# Patient Record
Sex: FEMALE | Race: WHITE | NOT HISPANIC OR LATINO | Employment: OTHER | URBAN - METROPOLITAN AREA
[De-identification: names, ages, dates, MRNs, and addresses within clinical notes are randomized per-mention and may not be internally consistent; named-entity substitution may affect disease eponyms.]

---

## 2017-01-17 ENCOUNTER — OFFICE VISIT (OUTPATIENT)
Dept: FAMILY MEDICINE | Facility: CLINIC | Age: 82
End: 2017-01-17
Payer: MEDICARE

## 2017-01-17 VITALS
RESPIRATION RATE: 18 BRPM | BODY MASS INDEX: 37.13 KG/M2 | SYSTOLIC BLOOD PRESSURE: 138 MMHG | HEIGHT: 62 IN | WEIGHT: 201.75 LBS | DIASTOLIC BLOOD PRESSURE: 82 MMHG | HEART RATE: 70 BPM

## 2017-01-17 DIAGNOSIS — Z12.31 ENCOUNTER FOR SCREENING MAMMOGRAM FOR MALIGNANT NEOPLASM OF BREAST: ICD-10-CM

## 2017-01-17 DIAGNOSIS — Z12.39 BREAST CANCER SCREENING: ICD-10-CM

## 2017-01-17 DIAGNOSIS — I10 ESSENTIAL HYPERTENSION: ICD-10-CM

## 2017-01-17 DIAGNOSIS — R29.898 WEAKNESS OF BOTH LOWER EXTREMITIES: Primary | ICD-10-CM

## 2017-01-17 DIAGNOSIS — R26.9 ABNORMAL TANDEM GAIT TEST: ICD-10-CM

## 2017-01-17 DIAGNOSIS — H53.453 PERIPHERAL VISUAL FIELD DEFECT OF BOTH EYES: ICD-10-CM

## 2017-01-17 LAB
CREAT SERPL-MCNC: 1.2 MG/DL
EST. GFR  (AFRICAN AMERICAN): 48 ML/MIN/1.73 M^2
EST. GFR  (NON AFRICAN AMERICAN): 41 ML/MIN/1.73 M^2

## 2017-01-17 PROCEDURE — 99999 PR PBB SHADOW E&M-EST. PATIENT-LVL III: CPT | Mod: PBBFAC,,, | Performed by: FAMILY MEDICINE

## 2017-01-17 PROCEDURE — 99214 OFFICE O/P EST MOD 30 MIN: CPT | Mod: S$GLB,,, | Performed by: FAMILY MEDICINE

## 2017-01-17 PROCEDURE — 99499 UNLISTED E&M SERVICE: CPT | Mod: S$PBB,,, | Performed by: FAMILY MEDICINE

## 2017-01-17 PROCEDURE — 82565 ASSAY OF CREATININE: CPT

## 2017-01-17 PROCEDURE — 36415 COLL VENOUS BLD VENIPUNCTURE: CPT | Mod: S$GLB,,, | Performed by: FAMILY MEDICINE

## 2017-01-17 NOTE — PROGRESS NOTES
Subjective:       Patient ID: Alysia Ross is a 85 y.o. female.    Chief Complaint: Knee Pain (Mark knee pain, Rt worse than Lt)    Pt is a 85 y.o. female who presents for evaluation and management of   Encounter Diagnosis   Name Primary?    Weakness of both lower extremities Yes   trouble lifting her feet when she walks   2 weeks   Worse with getting up, prolonged walking   No exacerbation or alleviating factors       Doing well on current meds. Denies any side effects. Prevention is up to date.  Review of Systems   Musculoskeletal: Positive for arthralgias and back pain.   Neurological: Positive for weakness.       Objective:      Physical Exam   Constitutional: She is oriented to person, place, and time. She appears well-developed and well-nourished.   HENT:   Head: Normocephalic and atraumatic.   Right Ear: External ear normal.   Left Ear: External ear normal.   Nose: Nose normal.   Mouth/Throat: Oropharynx is clear and moist.   Decreased visual fields peripherally bilaterally    Eyes: EOM are normal. Pupils are equal, round, and reactive to light.   Neck: Normal range of motion. Neck supple. No JVD present. No tracheal deviation present. No thyromegaly present.   Cardiovascular: Normal rate, normal heart sounds and intact distal pulses.    No murmur heard.  Pulmonary/Chest: Effort normal and breath sounds normal. No respiratory distress. She has no wheezes. She has no rales. She exhibits no tenderness.   Abdominal: Soft. Bowel sounds are normal. She exhibits no distension and no mass. There is no tenderness. There is no rebound and no guarding.   Musculoskeletal: Normal range of motion. She exhibits no edema or tenderness.   Lymphadenopathy:     She has no cervical adenopathy.   Neurological: She is alert and oriented to person, place, and time. She has normal reflexes. She displays normal reflexes. No cranial nerve deficit. She exhibits normal muscle tone. Coordination normal.   Skin: Skin is warm and  dry. No rash noted. No erythema. No pallor.   Psychiatric: She has a normal mood and affect. Her behavior is normal. Judgment and thought content normal.       Assessment:       1. Weakness of both lower extremities    2. Abnormal tandem gait test    3. Essential hypertension    4. 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

## 2017-01-17 NOTE — MR AVS SNAPSHOT
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 [phone]    2017 8:00 AM Carl Aguayo MD [facility] [phone]      Goals (5 Years of Data)     None      [facility]     [facility] Nurse Care [facility] - 24/7 Assistance  Registered nurses in the [facility] provide clinical advisement, health education, appointment booking, and other advisory services.  Call for this free service at [phone].             Medications           Message regarding Medications     Verify the changes and/or additions to your medication regime listed below are the same as discussed with your clinician today.  If any of these changes or additions are incorrect, please notify your healthcare provider.             Verify that the below list of medications is an accurate representation of the medications you are currently taking.  If none reported, the list may be blank. If incorrect, please contact  "your healthcare provider. Carry this list with you in case of emergency.           Current Medications     amlodipine (NORVASC) 2.5 MG tablet Take 1 tablet (2.5 mg total) by mouth once daily.    aspirin (ECOTRIN) 81 MG EC tablet Take 1 tablet by mouth Daily.    citalopram (CELEXA) 10 MG tablet Take 1 tablet (10 mg total) by mouth once daily.    clonazePAM (KLONOPIN) 0.5 MG tablet Take 0.5 tablets (0.25 mg total) by mouth once daily.    lisinopril (PRINIVIL,ZESTRIL) 40 MG tablet Take 1 tablet (40 mg total) by mouth once daily.    metoprolol succinate (TOPROL-XL) 25 MG 24 hr tablet Take 1 tablet (25 mg total) by mouth once daily.    omega-3 fatty acids 1,000 mg Cap Take 3 capsules by mouth Daily.    potassium chloride SA (KLOR-CON M20) 20 MEQ tablet Take 1 tablet (20 mEq total) by mouth once daily.    primidone (MYSOLINE) 50 MG Tab Take 1 tablet (50 mg total) by mouth 3 (three) times daily.    simvastatin (ZOCOR) 40 MG tablet Take 1 tablet (40 mg total) by mouth every evening.    spironolactone-hydrochlorothiazide 25-25mg (ALDACTAZIDE) 25-25 mg Tab Take 1 tablet by mouth once daily.           Clinical Reference Information           Vital Signs - Last Recorded  Most recent update: 1/17/2017  9:13 AM by Melissa Deleon LPN    BP Pulse Resp Ht Wt BMI    138/82 (BP Location: Left arm, Patient Position: Sitting, BP Method: Manual) 70 18 5' 2" (1.575 m) 91.5 kg (201 lb 11.5 oz) 36.9 kg/m2      Blood Pressure          Most Recent Value    BP  138/82      Allergies as of 1/17/2017     Benicar [Olmesartan]    Penicillins    Sulfa (Sulfonamide Antibiotics)      Immunizations Administered on Date of Encounter - 1/17/2017     None      Orders Placed During Today's Visit      Normal Orders This Visit    Ambulatory referral to Ophthalmology     Future Labs/Procedures Expected by Expires    Creatinine, serum  1/17/2017 3/18/2018    Mammo Digital Screening Bilat with CAD  1/17/2017 3/20/2018    MRI Brain W WO Contrast  " 1/17/2017 1/17/2018    MRI Lumbar Spine Without Contrast  1/17/2017 1/17/2018

## 2017-01-23 ENCOUNTER — HOSPITAL ENCOUNTER (OUTPATIENT)
Dept: RADIOLOGY | Facility: HOSPITAL | Age: 82
Discharge: HOME | End: 2017-01-23
Attending: FAMILY MEDICINE
Payer: MEDICARE

## 2017-01-23 DIAGNOSIS — R29.898 WEAKNESS OF BOTH LOWER EXTREMITIES: ICD-10-CM

## 2017-01-23 DIAGNOSIS — R26.9 ABNORMAL TANDEM GAIT TEST: ICD-10-CM

## 2017-01-23 PROCEDURE — 70553 MRI BRAIN STEM W/O & W/DYE: CPT | Mod: TC

## 2017-01-23 PROCEDURE — 72148 MRI LUMBAR SPINE W/O DYE: CPT | Mod: 26,,, | Performed by: RADIOLOGY

## 2017-01-23 PROCEDURE — 72148 MRI LUMBAR SPINE W/O DYE: CPT | Mod: TC

## 2017-01-23 PROCEDURE — 70553 MRI BRAIN STEM W/O & W/DYE: CPT | Mod: 26,,, | Performed by: RADIOLOGY

## 2017-01-23 PROCEDURE — A9585 GADOBUTROL INJECTION: HCPCS | Performed by: FAMILY MEDICINE

## 2017-01-23 PROCEDURE — 25500020 PHARM REV CODE 255: Performed by: FAMILY MEDICINE

## 2017-01-23 RX ORDER — GADOBUTROL 604.72 MG/ML
9 INJECTION INTRAVENOUS
Status: COMPLETED | OUTPATIENT
Start: 2017-01-23 | End: 2017-01-23

## 2017-01-23 RX ADMIN — GADOBUTROL 9 ML: 604.72 INJECTION INTRAVENOUS at 10:01

## 2017-01-24 ENCOUNTER — TELEPHONE (OUTPATIENT)
Dept: FAMILY MEDICINE | Facility: CLINIC | Age: 82
End: 2017-01-24

## 2017-01-24 NOTE — TELEPHONE ENCOUNTER
She did not have a stroke---good news.  She does have some normal shrinkage of the brain that occurs with age.   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 Physical therapy should be able to help with this. I would like her to go to physioft. Would she be agreeable to this? Thanks

## 2017-01-24 NOTE — TELEPHONE ENCOUNTER
It will help her. I cn get home PT if it is too difficult for her to get out. I know she doesn't drive anymore   How bout some home PT?

## 2017-01-24 NOTE — TELEPHONE ENCOUNTER
----- Message from Ryan Bobby sent at 2017 12:35 PM CST -----  Contact: Patient  Alysia Ross  MRN: [id number]  : 3/1/1931  PCP: Carl Aguayo  Home Phone      [phone]  Work Phone      Not on file.  Mobile          Not on file.      MESSAGE: had 2 MRI's done yesterday -- would like results    Call [phone]    PCP: Dede

## 2017-01-25 ENCOUNTER — TELEPHONE (OUTPATIENT)
Dept: FAMILY MEDICINE | Facility: CLINIC | Age: 82
End: 2017-01-25

## 2017-01-25 NOTE — TELEPHONE ENCOUNTER
Spoke to mrs Hatfield, states,she spoke to Jane at Dr Juan Antonio Lawson's office, who is a personal friend, and said, if we could get her to go to the house she would agree to it, says she only has medicare though, please advise.

## 2017-01-25 NOTE — TELEPHONE ENCOUNTER
Jane does not do home health, but I could order home health with PT, it would be a different physical therapist

## 2017-01-25 NOTE — TELEPHONE ENCOUNTER
----- Message from Sulma Riggins sent at 2017  9:44 AM CST -----  Contact: self  Alysia Ross  MRN: [id number]  : 3/1/1931  PCP: Carl Aguayo  Home Phone      [phone]  Work Phone      Not on file.  Mobile          Not on file.      MESSAGE:    Got results of test yesterday and was told to call back with what she decided to do as treatment would like to speak to nurse on this    Phone:  [phone]

## 2017-01-26 NOTE — TELEPHONE ENCOUNTER
Ok, I'm sorry. I don't think I made myself clear. Jane does not do home PT. But I could order home health and have a therapist come work with her at home---it will most likely be a physical therapist that she doesn't know. Is she ok with this???

## 2017-01-27 NOTE — TELEPHONE ENCOUNTER
Pt requested an appointment with Dr. Aguayo for Monday to discuss PT. Jane says after PT order is received it can be faxed to [facility] at [phone]

## 2017-01-30 ENCOUNTER — TELEPHONE (OUTPATIENT)
Dept: FAMILY MEDICINE | Facility: CLINIC | Age: 82
End: 2017-01-30

## 2017-01-30 NOTE — TELEPHONE ENCOUNTER
----- Message from [place] sent at 2017  1:33 PM CST -----  Contact: [facility]  Alysia Ross  MRN: [id number]  : 3/1/1931  PCP: Carl Aguayo  Home Phone      [phone]  Work Phone      Not on file.  Mobile          Not on file.      MESSAGE: PT CALLED THERE TWICE TO SET UP PT APPT, SAYS SHE SPOKE TO [facility] NURSE Friday, AND WE WERE SUPPOSED TO SEND THEM ORDERS. CAN WE SEND THAT OVER TO THEM SO THEY CAN SCHEDULE APPT WITH PT?    PHONE: [phone]      PHOEN:

## 2017-04-21 ENCOUNTER — OFFICE VISIT (OUTPATIENT)
Dept: NEUROLOGY | Facility: CLINIC | Age: 82
End: 2017-04-21
Payer: MEDICARE

## 2017-04-21 VITALS
RESPIRATION RATE: 17 BRPM | WEIGHT: 201.75 LBS | HEART RATE: 60 BPM | SYSTOLIC BLOOD PRESSURE: 122 MMHG | DIASTOLIC BLOOD PRESSURE: 72 MMHG | BODY MASS INDEX: 37.13 KG/M2 | HEIGHT: 62 IN

## 2017-04-21 DIAGNOSIS — M48.061 LUMBAR STENOSIS: ICD-10-CM

## 2017-04-21 DIAGNOSIS — G25.0 BENIGN ESSENTIAL TREMOR: Primary | ICD-10-CM

## 2017-04-21 DIAGNOSIS — I69.90 LATE EFFECTS OF CVA (CEREBROVASCULAR ACCIDENT): ICD-10-CM

## 2017-04-21 DIAGNOSIS — R25.1 TREMOR: ICD-10-CM

## 2017-04-21 PROCEDURE — 99499 UNLISTED E&M SERVICE: CPT | Mod: S$PBB,,, | Performed by: PSYCHIATRY & NEUROLOGY

## 2017-04-21 PROCEDURE — 1159F MED LIST DOCD IN RCRD: CPT | Performed by: PSYCHIATRY & NEUROLOGY

## 2017-04-21 PROCEDURE — 1160F RVW MEDS BY RX/DR IN RCRD: CPT | Performed by: PSYCHIATRY & NEUROLOGY

## 2017-04-21 PROCEDURE — 1125F AMNT PAIN NOTED PAIN PRSNT: CPT | Performed by: PSYCHIATRY & NEUROLOGY

## 2017-04-21 PROCEDURE — 1157F ADVNC CARE PLAN IN RCRD: CPT | Mod: 8P | Performed by: PSYCHIATRY & NEUROLOGY

## 2017-04-21 PROCEDURE — 99213 OFFICE O/P EST LOW 20 MIN: CPT | Mod: PBBFAC | Performed by: PSYCHIATRY & NEUROLOGY

## 2017-04-21 PROCEDURE — 99204 OFFICE O/P NEW MOD 45 MIN: CPT | Mod: S$PBB | Performed by: PSYCHIATRY & NEUROLOGY

## 2017-04-21 PROCEDURE — 99999 PR PBB SHADOW E&M-EST. PATIENT-LVL III: CPT | Mod: PBBFAC,,, | Performed by: PSYCHIATRY & NEUROLOGY

## 2017-04-21 RX ORDER — PRIMIDONE 50 MG/1
50 TABLET ORAL 3 TIMES DAILY
Qty: 90 TABLET | Refills: 5 | Status: SHIPPED | OUTPATIENT
Start: 2017-04-21 | End: 2017-05-08 | Stop reason: SDUPTHER

## 2017-04-21 NOTE — MR AVS SNAPSHOT
[facility]  [address] [address]  Phone: [phone]  Fax: [phone]                  Alysia Ross   2017 9:15 AM   Office Visit    Description:  Female : 3/1/1931   Provider:  Liam Kurtz MD   Department:  [facility]           Reason for Visit     Neurologic Problem     Back Pain           Diagnoses this Visit        Comments    Benign essential tremor    -  Primary     Late effects of CVA (cerebrovascular accident)         Lumbar stenosis                To Do List           Future Appointments        Provider Department Dept Phone    2017 8:00 AM Carl Aguayo MD [facility] [phone]      Goals (5 Years of Data)     None      Follow-Up and Disposition     Return in about 6 months (around 10/21/2017).      [facility]     [facility] On Call Nurse Care Line -  Assistance  Unless otherwise directed by your provider, please contact [facility], our nurse care line that is available for  assistance.     Registered nurses in the [facility] On Call Center provide: appointment scheduling, clinical advisement, health education, and other advisory services.  Call: [phone] (toll free)               Medications           Message regarding Medications     Verify the changes and/or additions to your medication regime listed below are the same as discussed with your clinician today.  If any of these changes or additions are incorrect, please notify your healthcare provider.             Verify that the below list of medications is an accurate representation of the medications you are currently taking.  If none reported, the list may be blank. If incorrect, please contact your healthcare provider. Carry this list with you in case of emergency.           Current Medications     amlodipine (NORVASC) 2.5 MG tablet Take 1 tablet (2.5 mg total) by mouth once daily.    aspirin (ECOTRIN) 81 MG EC tablet  "Take 1 tablet by mouth Daily.    citalopram (CELEXA) 10 MG tablet Take 1 tablet (10 mg total) by mouth once daily.    clonazePAM (KLONOPIN) 0.5 MG tablet Take 0.5 tablets (0.25 mg total) by mouth once daily.    lisinopril (PRINIVIL,ZESTRIL) 40 MG tablet Take 1 tablet (40 mg total) by mouth once daily.    metoprolol succinate (TOPROL-XL) 25 MG 24 hr tablet Take 1 tablet (25 mg total) by mouth once daily.    omega-3 fatty acids 1,000 mg Cap Take 3 capsules by mouth Daily.    potassium chloride SA (KLOR-CON M20) 20 MEQ tablet Take 1 tablet (20 mEq total) by mouth once daily.    primidone (MYSOLINE) 50 MG Tab Take 1 tablet (50 mg total) by mouth 3 (three) times daily.    simvastatin (ZOCOR) 40 MG tablet Take 1 tablet (40 mg total) by mouth every evening.    spironolactone-hydrochlorothiazide 25-25mg (ALDACTAZIDE) 25-25 mg Tab Take 1 tablet by mouth once daily.           Clinical Reference Information           Your Vitals Were     BP Pulse Resp Height Weight BMI    122/72 (BP Location: Right arm, Patient Position: Sitting, BP Method: Manual) 60 17 5' 2" (1.575 m) 91.5 kg (201 lb 11.5 oz) 36.9 kg/m2      Blood Pressure          Most Recent Value    BP  122/72      Allergies as of 4/21/2017     Benicar [Olmesartan]    Penicillins    Sulfa (Sulfonamide Antibiotics)      Immunizations Administered on Date of Encounter - 4/21/2017     None      [facility] Sign-Up     Activating your [facility] account is as easy as 1-2-3!     1) Visit [email or web address], select Sign Up Now, enter this activation code and your date of birth, then select Next.  Activation code not generated  Current Patient Portal Status: Account disabled      2) Create a username and password to use when you visit [facility] in the future and select a security question in case you lose your password and select Next.    3) Enter your e-mail address and click Sign Up!    Additional Information  If you have questions, please e-mail [email or web address] or call " [phone] to talk to our [facility] staff. Remember, [facility] is NOT to be used for urgent needs. For medical emergencies, dial 911.         Language Assistance Services     ATTENTION: Language assistance services are available, free of charge. Please call [phone].      ATENCIÓN: Si habla klaus, tiene a fulton disposición servicios gratuitos de asistencia lingüística. Llame al [phone].     CHÚ Ý: N?u b?n nói Ti?ng Vi?t, có các d?ch v? h? tr? ngôn ng? mi?n phí dành cho b?n. G?i s? [phone].         [facility] complies with applicable Federal civil rights laws and does not discriminate on the basis of race, color, national origin, age, disability, or sex.

## 2017-04-21 NOTE — PROGRESS NOTES
HPI :Alysia Ross is a 85 y.o. female with HTN, hyperlipidemia and remote CVA impacting speech now complaining of 6 months of speech changes (word finding difficulty) (with no new changes by MRI-old lacunar change only)  and tremor with activities/ posture only. Looks more like essential tremor  with no additional Parkinson's feature other than jaw tremor.   A convulsive syncope provoked by maintained upright position during syncope in 4/2016.   Since the last visit, the patient suffered back pain and difficulty walking.  PCP ordered MRI brain and L spine (see below).  She was sent to PT which allowed some relief. She has 10/10 pain most days and uses heat with some relief.  She has self given up driving.  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  Her tremor is well controlled. Her pharmacy ran out of primidone last week and she noticed her handwriting was far worse when she was on less of this med.   NO further syncope.   Review of Systems   Constitutional: Negative for fever.   HENT: Negative for nosebleeds.    Eyes: Negative for double vision.   Respiratory: Negative for hemoptysis.    Cardiovascular: Negative for leg swelling.   Gastrointestinal: Negative for blood in stool.   Genitourinary: Negative for hematuria.   Musculoskeletal: Positive for back pain. Negative for falls.   Skin: Negative for rash.   Neurological: Positive for tremors.   Psychiatric/Behavioral: The patient does not have insomnia.          Gen Appearance, well developed/nourished in no apparent distress  CV: 2+ distal pulses with no edema or swelling  Neuro:  MS: Awake, alert, . Sustains attention. Recent/remote memory intact, Language is full to spontaneous speech/comprehension. Fund of Knowledge is full, and speech is normal today  CN:  PERRL, Extraoccular movements and visual fields are full. Normal facial sensation and strength, Hearing symmetric, Tongue and Palate are midline and  strong. Shoulder Shrug symmetric and strong.  Motor: Normal bulk, tone, no abnormal movements at rest and no bradykinesia except for an occassional tongue smacking today-- no jaw tremor. Tremor with outstretched hands and action only, very mild 5/5 strength bilateral upper/lower extremities with 2+ reflexes   Sensory: symmetric to temp, and vibration.  Romberg negative  Cerebellar: Finger-nose Rapid alternating movements intact  Gait: Normal stance, no ataxia. Posture is good, mild difficulty with rising from chair, no shuffling, no en bloc turning and no reduced arm swing. Postural reflexes mildly impaired  She is using a cane well.    Imagin/3/2016 CT head: No acute intracranial process.   Moderate chronic ischemic microvascular change.    EEG: Clinical Impression:  Normal awake and  Drowsy EEG.      Echo showed normal EF    Holter showed: 12 beat run of nonsustained atrial tachycardia.    EKG showed NSR    Labs: LDL just over 100 in 2016 MRI Brain: 1.  No MRI evidence of an acute intracranial abnormality.  2. Advanced atrophy and small vessel ischemic changes of the periventricular white matter.      2017 MRI Lumbar spine: 1.  Advanced facet degenerative changes with minimal anterolisthesis of L4 on L5 causing moderate acquired spinal stenosis.  Moderate right-sided foraminal encroachment.  2.  Degenerative changes at L2 and L3 with mild to moderate acquired spinal canal narrowing and foraminal encroachment.  3.  Degenerative disc bulge greater to the left at L1-L2.    Assessment/Plan: Alysia Ross is a 85 y.o. female with HTN, hyperlipidemia and remote CVA impacting speech now complaining of 6 months of speech changes (word finding difficulty) (with no new changes by MRI-old lacunar change only)  and tremor with activities/ posture only. Looks more like essential tremor  with no additional Parkinson's feature other than jaw tremor.   A convulsive syncope provoked by maintained upright  position during syncope in 4/2016.   I recommend:   1. EEG, neuro-exam,  and CT were reassuring. No further neurological work up needed. Syncope spells likely vasovagally mediated. No further spells.   2. Metoprolol given for atrial tach is also helping tremor. Also continue Primidone 50mg TID for tremor  3. For Lumbar pain: offered pain management consult- she declines. Neuropathic cream given for back pain to use unless rash (note moderate spinal stenosis by Lumbar 2017) . PT helped prior  4. Speech changes are responding to Primidone/ may be tremor related. Vs Late effect CVA (dystonia) / repeat imaging benign.   5. Clonazepam may be helping jaw tremor- now prescribed by PCP  6. Remote History of CVA noted-- For stroke prevention: Continue treatment for  HTN, Dyslipidemia and continue ASA daily  RTC 6 months

## 2017-05-08 ENCOUNTER — OFFICE VISIT (OUTPATIENT)
Dept: FAMILY MEDICINE | Facility: CLINIC | Age: 82
End: 2017-05-08
Payer: MEDICARE

## 2017-05-08 VITALS
HEIGHT: 62 IN | HEART RATE: 56 BPM | BODY MASS INDEX: 38.5 KG/M2 | RESPIRATION RATE: 18 BRPM | SYSTOLIC BLOOD PRESSURE: 130 MMHG | DIASTOLIC BLOOD PRESSURE: 62 MMHG | WEIGHT: 209.19 LBS

## 2017-05-08 DIAGNOSIS — M81.0 OSTEOPOROSIS, UNSPECIFIED OSTEOPOROSIS TYPE, UNSPECIFIED PATHOLOGICAL FRACTURE PRESENCE: ICD-10-CM

## 2017-05-08 DIAGNOSIS — E87.6 HYPOKALEMIA: ICD-10-CM

## 2017-05-08 DIAGNOSIS — R60.9 DEPENDENT EDEMA: ICD-10-CM

## 2017-05-08 DIAGNOSIS — E78.5 DYSLIPIDEMIA: Primary | ICD-10-CM

## 2017-05-08 DIAGNOSIS — R25.1 TREMOR: ICD-10-CM

## 2017-05-08 DIAGNOSIS — E55.9 VITAMIN D DEFICIENCY DISEASE: ICD-10-CM

## 2017-05-08 DIAGNOSIS — F32.A DEPRESSION, UNSPECIFIED DEPRESSION TYPE: ICD-10-CM

## 2017-05-08 DIAGNOSIS — M48.061 LUMBAR STENOSIS: ICD-10-CM

## 2017-05-08 DIAGNOSIS — N18.2 CKD (CHRONIC KIDNEY DISEASE), STAGE 2 (MILD): ICD-10-CM

## 2017-05-08 DIAGNOSIS — E78.5 HYPERLIPIDEMIA, UNSPECIFIED HYPERLIPIDEMIA TYPE: ICD-10-CM

## 2017-05-08 DIAGNOSIS — M19.90 OSTEOARTHRITIS, UNSPECIFIED OSTEOARTHRITIS TYPE, UNSPECIFIED SITE: ICD-10-CM

## 2017-05-08 DIAGNOSIS — I10 ESSENTIAL HYPERTENSION: ICD-10-CM

## 2017-05-08 LAB
25(OH)D3+25(OH)D2 SERPL-MCNC: 50 NG/ML
ALBUMIN SERPL BCP-MCNC: 3.2 G/DL
ALP SERPL-CCNC: 65 U/L
ALT SERPL W/O P-5'-P-CCNC: 12 U/L
ANION GAP SERPL CALC-SCNC: 7 MMOL/L
AST SERPL-CCNC: 21 U/L
BILIRUB SERPL-MCNC: 0.6 MG/DL
BUN SERPL-MCNC: 27 MG/DL
CALCIUM SERPL-MCNC: 10 MG/DL
CHLORIDE SERPL-SCNC: 103 MMOL/L
CHOLEST/HDLC SERPL: 4.7 {RATIO}
CO2 SERPL-SCNC: 31 MMOL/L
CREAT SERPL-MCNC: 1 MG/DL
EST. GFR  (AFRICAN AMERICAN): 59 ML/MIN/1.73 M^2
EST. GFR  (NON AFRICAN AMERICAN): 51 ML/MIN/1.73 M^2
GLUCOSE SERPL-MCNC: 98 MG/DL
HDL/CHOLESTEROL RATIO: 21.4 %
HDLC SERPL-MCNC: 168 MG/DL
HDLC SERPL-MCNC: 36 MG/DL
LDLC SERPL CALC-MCNC: 111 MG/DL
NONHDLC SERPL-MCNC: 132 MG/DL
POTASSIUM SERPL-SCNC: 4.3 MMOL/L
PROT SERPL-MCNC: 7.5 G/DL
SODIUM SERPL-SCNC: 141 MMOL/L
TRIGL SERPL-MCNC: 105 MG/DL
TSH SERPL DL<=0.005 MIU/L-ACNC: 0.83 UIU/ML

## 2017-05-08 PROCEDURE — 1126F AMNT PAIN NOTED NONE PRSNT: CPT | Performed by: FAMILY MEDICINE

## 2017-05-08 PROCEDURE — 99214 OFFICE O/P EST MOD 30 MIN: CPT | Mod: S$PBB | Performed by: FAMILY MEDICINE

## 2017-05-08 PROCEDURE — 99499 UNLISTED E&M SERVICE: CPT | Mod: S$PBB,,, | Performed by: FAMILY MEDICINE

## 2017-05-08 PROCEDURE — 99213 OFFICE O/P EST LOW 20 MIN: CPT | Mod: PBBFAC | Performed by: FAMILY MEDICINE

## 2017-05-08 PROCEDURE — 1157F ADVNC CARE PLAN IN RCRD: CPT | Mod: 8P | Performed by: FAMILY MEDICINE

## 2017-05-08 PROCEDURE — 80061 LIPID PANEL: CPT

## 2017-05-08 PROCEDURE — 36415 COLL VENOUS BLD VENIPUNCTURE: CPT | Mod: PBBFAC

## 2017-05-08 PROCEDURE — 82306 VITAMIN D 25 HYDROXY: CPT

## 2017-05-08 PROCEDURE — 99999 PR PBB SHADOW E&M-EST. PATIENT-LVL III: CPT | Mod: PBBFAC,,, | Performed by: FAMILY MEDICINE

## 2017-05-08 PROCEDURE — 84443 ASSAY THYROID STIM HORMONE: CPT

## 2017-05-08 PROCEDURE — 1160F RVW MEDS BY RX/DR IN RCRD: CPT | Performed by: FAMILY MEDICINE

## 2017-05-08 PROCEDURE — 1159F MED LIST DOCD IN RCRD: CPT | Performed by: FAMILY MEDICINE

## 2017-05-08 PROCEDURE — 80053 COMPREHEN METABOLIC PANEL: CPT

## 2017-05-08 RX ORDER — CITALOPRAM 10 MG/1
10 TABLET ORAL DAILY
Qty: 30 TABLET | Refills: 5 | Status: SHIPPED | OUTPATIENT
Start: 2017-05-08 | End: 2017-11-15 | Stop reason: SDUPTHER

## 2017-05-08 RX ORDER — PRIMIDONE 50 MG/1
50 TABLET ORAL 3 TIMES DAILY
Qty: 90 TABLET | Refills: 5 | Status: SHIPPED | OUTPATIENT
Start: 2017-05-08 | End: 2017-11-15 | Stop reason: SDUPTHER

## 2017-05-08 RX ORDER — ALENDRONATE SODIUM 70 MG/1
70 TABLET ORAL
Qty: 4 TABLET | Refills: 11 | Status: SHIPPED | OUTPATIENT
Start: 2017-05-08 | End: 2017-11-15 | Stop reason: SDUPTHER

## 2017-05-08 RX ORDER — METOPROLOL SUCCINATE 25 MG/1
25 TABLET, EXTENDED RELEASE ORAL DAILY
Qty: 30 TABLET | Refills: 5 | Status: SHIPPED | OUTPATIENT
Start: 2017-05-08 | End: 2017-11-15 | Stop reason: SDUPTHER

## 2017-05-08 RX ORDER — SIMVASTATIN 40 MG/1
40 TABLET, FILM COATED ORAL NIGHTLY
Qty: 30 TABLET | Refills: 5 | Status: SHIPPED | OUTPATIENT
Start: 2017-05-08 | End: 2017-11-15 | Stop reason: SDUPTHER

## 2017-05-08 RX ORDER — SPIRONOLACTONE AND HYDROCHLOROTHIAZIDE 25; 25 MG/1; MG/1
1 TABLET ORAL DAILY
Qty: 30 TABLET | Refills: 5 | Status: SHIPPED | OUTPATIENT
Start: 2017-05-08 | End: 2017-11-15 | Stop reason: SDUPTHER

## 2017-05-08 RX ORDER — POTASSIUM CHLORIDE 20 MEQ/1
20 TABLET, EXTENDED RELEASE ORAL DAILY
Qty: 30 TABLET | Refills: 5 | Status: SHIPPED | OUTPATIENT
Start: 2017-05-08 | End: 2017-11-15 | Stop reason: SDUPTHER

## 2017-05-08 RX ORDER — TRAMADOL HYDROCHLORIDE 50 MG/1
50 TABLET ORAL EVERY 6 HOURS PRN
Qty: 60 TABLET | Refills: 2 | Status: SHIPPED | OUTPATIENT
Start: 2017-05-08 | End: 2017-09-26 | Stop reason: SDUPTHER

## 2017-05-08 RX ORDER — AMLODIPINE BESYLATE 2.5 MG/1
2.5 TABLET ORAL DAILY
Qty: 30 TABLET | Refills: 5 | Status: SHIPPED | OUTPATIENT
Start: 2017-05-08 | End: 2017-11-15 | Stop reason: SDUPTHER

## 2017-05-08 RX ORDER — LISINOPRIL 40 MG/1
40 TABLET ORAL DAILY
Qty: 30 TABLET | Refills: 5 | Status: SHIPPED | OUTPATIENT
Start: 2017-05-08 | End: 2017-11-15 | Stop reason: SDUPTHER

## 2017-05-08 NOTE — MR AVS SNAPSHOT
[facility]  [address]  [facility] [address]  Phone: [phone]  Fax: [phone]                  Alysia Ross   2017 8:00 AM   Office Visit    Description:  Female : 3/1/1931   Provider:  Carl Aguayo MD   Department:  [facility]           Reason for Visit     Follow-up     Knee Pain           Diagnoses this Visit        Comments    Dyslipidemia    -  Primary     Depression, unspecified depression type         Essential hypertension         CKD (chronic kidney disease), stage 2 (mild)         Osteoporosis, unspecified osteoporosis type, unspecified pathological fracture presence         Vitamin D deficiency disease         Lumbar stenosis         Tremor         Hypokalemia         Hyperlipidemia, unspecified hyperlipidemia type         Dependent edema         Osteoarthritis, unspecified osteoarthritis type, unspecified site                To Do List           Future Appointments        Provider Department Dept Phone    10/23/2017 8:45 AM Liam Kurtz MD [facility] - Neurology [phone]    2017 10:45 AM Carl Aguayo MD [facility] [phone]      Goals (5 Years of Data)     None      Follow-Up and Disposition     Return in about 6 months (around 2017).       These Medications        Disp Refills Start End    alendronate (FOSAMAX) 70 MG tablet 4 tablet 11 2017    Take 1 tablet (70 mg total) by mouth every 7 days. - Oral    Pharmacy: [facility] [address] [place] [address] Ph #: [phone]       amlodipine (NORVASC) 2.5 MG tablet 30 tablet 2017     Take 1 tablet (2.5 mg total) by mouth once daily. - Oral    Pharmacy: [facility] [address] [place] [address] Ph #: [phone]       citalopram (CELEXA) 10 MG tablet 30 tablet 2017     Take 1 tablet (10 mg total) by mouth once daily. - Oral    Pharmacy: [facility] [address] [place] [address]  [place] [address] Ph #: [phone]       lisinopril (PRINIVIL,ZESTRIL) 40 MG tablet 30 tablet 5 5/8/2017     Take 1 tablet (40 mg total) by mouth once daily. - Oral    Pharmacy: [place] [address] Ph #: [phone]       metoprolol succinate (TOPROL-XL) 25 MG 24 hr tablet 30 tablet 5 5/8/2017     Take 1 tablet (25 mg total) by mouth once daily. - Oral    Pharmacy: [place] [address] Ph #: [phone]       potassium chloride SA (KLOR-CON M20) 20 MEQ tablet 30 tablet 5 5/8/2017     Take 1 tablet (20 mEq total) by mouth once daily. - Oral    Pharmacy: [place] [address] Ph #: [phone]       primidone (MYSOLINE) 50 MG Tab 90 tablet 5 5/8/2017     Take 1 tablet (50 mg total) by mouth 3 (three) times daily. - Oral    Pharmacy: [place] [address] Ph #: [phone]       simvastatin (ZOCOR) 40 MG tablet 30 tablet 5 5/8/2017     Take 1 tablet (40 mg total) by mouth every evening. - Oral    Pharmacy: [place] [address] Ph #: [phone]       spironolactone-hydrochlorothiazide 25-25mg (ALDACTAZIDE) 25-25 mg Tab 30 tablet 5 5/8/2017     Take 1 tablet by mouth once daily. - Oral    Pharmacy: [place] [address] Ph #: [phone]       tramadol (ULTRAM) 50 mg tablet 60 tablet 2 5/8/2017 5/18/2017    Take 1 tablet (50 mg total) by mouth every 6 (six) hours as needed for Pain. - Oral    Pharmacy: [place] [address] Ph #: [phone]         [facility]     [facility] Nurse [facility] - 24/7 Assistance  Unless otherwise directed by your provider, please contact [facility], our nurse care line that is available for 24/7 assistance.     Registered nurses in the [facility] provide: appointment scheduling, clinical advisement, health education, and other advisory  services.  Call: [phone] (toll free)               Medications           Message regarding Medications     Verify the changes and/or additions to your medication regime listed below are the same as discussed with your clinician today.  If any of these changes or additions are incorrect, please notify your healthcare provider.        START taking these NEW medications        Refills    alendronate (FOSAMAX) 70 MG tablet 11    Sig: Take 1 tablet (70 mg total) by mouth every 7 days.    Class: Normal    Route: Oral    tramadol (ULTRAM) 50 mg tablet 2    Sig: Take 1 tablet (50 mg total) by mouth every 6 (six) hours as needed for Pain.    Class: Normal    Route: Oral           Verify that the below list of medications is an accurate representation of the medications you are currently taking.  If none reported, the list may be blank. If incorrect, please contact your healthcare provider. Carry this list with you in case of emergency.           Current Medications     amlodipine (NORVASC) 2.5 MG tablet Take 1 tablet (2.5 mg total) by mouth once daily.    aspirin (ECOTRIN) 81 MG EC tablet Take 1 tablet by mouth Daily.    citalopram (CELEXA) 10 MG tablet Take 1 tablet (10 mg total) by mouth once daily.    clonazePAM (KLONOPIN) 0.5 MG tablet Take 0.5 tablets (0.25 mg total) by mouth once daily.    lisinopril (PRINIVIL,ZESTRIL) 40 MG tablet Take 1 tablet (40 mg total) by mouth once daily.    metoprolol succinate (TOPROL-XL) 25 MG 24 hr tablet Take 1 tablet (25 mg total) by mouth once daily.    omega-3 fatty acids 1,000 mg Cap Take 3 capsules by mouth Daily.    potassium chloride SA (KLOR-CON M20) 20 MEQ tablet Take 1 tablet (20 mEq total) by mouth once daily.    primidone (MYSOLINE) 50 MG Tab Take 1 tablet (50 mg total) by mouth 3 (three) times daily.    simvastatin (ZOCOR) 40 MG tablet Take 1 tablet (40 mg total) by mouth every evening.    spironolactone-hydrochlorothiazide 25-25mg (ALDACTAZIDE) 25-25 mg Tab Take 1  "tablet by mouth once daily.    alendronate (FOSAMAX) 70 MG tablet Take 1 tablet (70 mg total) by mouth every 7 days.    tramadol (ULTRAM) 50 mg tablet Take 1 tablet (50 mg total) by mouth every 6 (six) hours as needed for Pain.           Clinical Reference Information           Your Vitals Were     BP Pulse Resp Height Weight BMI    130/62 (BP Location: Right arm, Patient Position: Sitting, BP Method: Manual) 56 18 5' 2" (1.575 m) 94.9 kg (209 lb 3.5 oz) 38.27 kg/m2      Blood Pressure          Most Recent Value    BP  130/62      Allergies as of 5/8/2017     Benicar [Olmesartan]    Penicillins    Sulfa (Sulfonamide Antibiotics)      Immunizations Administered on Date of Encounter - 5/8/2017     None      Orders Placed During Today's Visit     Future Labs/Procedures Expected by Expires    Comprehensive metabolic panel  5/8/2017 5/8/2018    Lipid panel  5/8/2017 5/8/2018    TSH  5/8/2017 5/8/2018    Vitamin D  5/8/2017 5/8/2018      Language Assistance Services     ATTENTION: Language assistance services are available, free of charge. Please call [phone].      ATENCIÓN: Si sharitala klaus, tiene a fulton disposición servicios gratuitos de asistencia lingüística. Llame al [phone].     NOELLE Ý: N?u b?n nói Ti?ng Vi?t, có các d?ch v? h? tr? ngôn ng? mi?n phí dành cho b?n. G?i s? [phone].         [facility] complies with applicable Federal civil rights laws and does not discriminate on the basis of race, color, national origin, age, disability, or sex.        "

## 2017-05-08 NOTE — PROGRESS NOTES
Subjective:       Patient ID: Alysia Ross is a 86 y.o. female.    Chief Complaint: Follow-up (6 month ) and Knee Pain (bilateral)    Pt is a 86 y.o. female who presents for evaluation and management of   Encounter Diagnoses   Name Primary?    Dyslipidemia Yes    Depression, unspecified depression type     Essential hypertension     CKD (chronic kidney disease), stage 2 (mild)     Osteoporosis, unspecified osteoporosis type, unspecified pathological fracture presence     Vitamin D deficiency disease     Lumbar stenosis    .  Doing well on current meds. Denies any side effects. Prevention is up to date.    Review of Systems   Constitutional: Negative for chills and fever.   Respiratory: Negative for shortness of breath.    Cardiovascular: Negative for chest pain and palpitations.   Gastrointestinal: Negative for abdominal pain, blood in stool, constipation and nausea.   Genitourinary: Negative for difficulty urinating.   Musculoskeletal: Positive for arthralgias.   Psychiatric/Behavioral: Negative for dysphoric mood, sleep disturbance and suicidal ideas. The patient is not nervous/anxious.        Objective:      Physical Exam   Constitutional: She is oriented to person, place, and time. She appears well-developed and well-nourished.   HENT:   Head: Normocephalic and atraumatic.   Right Ear: External ear normal.   Left Ear: External ear normal.   Nose: Nose normal.   Mouth/Throat: Oropharynx is clear and moist.   Eyes: EOM are normal. Pupils are equal, round, and reactive to light.   Neck: Normal range of motion. Neck supple. No JVD present. No tracheal deviation present. No thyromegaly present.   Cardiovascular: Normal rate, normal heart sounds and intact distal pulses.    No murmur heard.  Pulmonary/Chest: Effort normal and breath sounds normal. No respiratory distress. She has no wheezes. She has no rales. She exhibits no tenderness.   Abdominal: Soft. Bowel sounds are normal. She exhibits no  distension and no mass. There is no tenderness. There is no rebound and no guarding.   Musculoskeletal: Normal range of motion. She exhibits no edema or tenderness.   Lymphadenopathy:     She has no cervical adenopathy.   Neurological: She is alert and oriented to person, place, and time. She has normal reflexes. She displays normal reflexes. No cranial nerve deficit. She exhibits normal muscle tone. Coordination normal.   Skin: Skin is warm and dry. No rash noted. No erythema. No pallor.   Psychiatric: She has a normal mood and affect. Her behavior is normal. Judgment and thought content normal.       Assessment:       1. Dyslipidemia    2. Depression, unspecified depression type    3. Essential hypertension    4. CKD (chronic kidney disease), stage 2 (mild)    5. Osteoporosis, unspecified osteoporosis type, unspecified pathological fracture presence    6. Vitamin D deficiency disease    7. Lumbar stenosis    8. Tremor    9. Hypokalemia    10. Hyperlipidemia, unspecified hyperlipidemia type    11. Dependent edema    12. Osteoarthritis, unspecified osteoarthritis type, unspecified site        Plan:   Alysia ROBERTS was seen today for follow-up and knee pain.    Diagnoses and all orders for this visit:    Dyslipidemia  -     Comprehensive metabolic panel; Future  -     Lipid panel; Future    Depression, unspecified depression type  -     citalopram (CELEXA) 10 MG tablet; Take 1 tablet (10 mg total) by mouth once daily.    Essential hypertension  -     Comprehensive metabolic panel; Future  -     Lipid panel; Future  -     TSH; Future  -     amlodipine (NORVASC) 2.5 MG tablet; Take 1 tablet (2.5 mg total) by mouth once daily.  -     lisinopril (PRINIVIL,ZESTRIL) 40 MG tablet; Take 1 tablet (40 mg total) by mouth once daily.  -     metoprolol succinate (TOPROL-XL) 25 MG 24 hr tablet; Take 1 tablet (25 mg total) by mouth once daily.  -     spironolactone-hydrochlorothiazide 25-25mg (ALDACTAZIDE) 25-25 mg Tab; Take 1 tablet  by mouth once daily.    CKD (chronic kidney disease), stage 2 (mild)    Osteoporosis, unspecified osteoporosis type, unspecified pathological fracture presence  -     Vitamin D; Future  -     alendronate (FOSAMAX) 70 MG tablet; Take 1 tablet (70 mg total) by mouth every 7 days.    Vitamin D deficiency disease    Lumbar stenosis    Tremor  -     primidone (MYSOLINE) 50 MG Tab; Take 1 tablet (50 mg total) by mouth 3 (three) times daily.    Hypokalemia  -     potassium chloride SA (KLOR-CON M20) 20 MEQ tablet; Take 1 tablet (20 mEq total) by mouth once daily.    Hyperlipidemia, unspecified hyperlipidemia type  -     simvastatin (ZOCOR) 40 MG tablet; Take 1 tablet (40 mg total) by mouth every evening.    Dependent edema  -     spironolactone-hydrochlorothiazide 25-25mg (ALDACTAZIDE) 25-25 mg Tab; Take 1 tablet by mouth once daily.    Osteoarthritis, unspecified osteoarthritis type, unspecified site  -     tramadol (ULTRAM) 50 mg tablet; Take 1 tablet (50 mg total) by mouth every 6 (six) hours as needed for Pain.    Trying fosamax rx again. In the past it has been too $$$$, as well as prolia,  for her but she has a new drug plan now   RTC 6 months   No Follow-up on file.

## 2017-05-25 DIAGNOSIS — F32.A DEPRESSION, UNSPECIFIED DEPRESSION TYPE: ICD-10-CM

## 2017-05-25 RX ORDER — CLONAZEPAM 0.5 MG/1
0.25 TABLET ORAL DAILY
Qty: 45 TABLET | Refills: 1 | Status: SHIPPED | OUTPATIENT
Start: 2017-05-25 | End: 2017-11-15 | Stop reason: SDUPTHER

## 2017-07-10 DIAGNOSIS — I10 ESSENTIAL HYPERTENSION: ICD-10-CM

## 2017-07-11 RX ORDER — METOPROLOL SUCCINATE 25 MG/1
TABLET, EXTENDED RELEASE ORAL
Qty: 30 TABLET | Refills: 0 | Status: SHIPPED | OUTPATIENT
Start: 2017-07-11 | End: 2017-10-23 | Stop reason: SDUPTHER

## 2017-09-26 DIAGNOSIS — M19.90 OSTEOARTHRITIS, UNSPECIFIED OSTEOARTHRITIS TYPE, UNSPECIFIED SITE: ICD-10-CM

## 2017-09-26 RX ORDER — TRAMADOL HYDROCHLORIDE 50 MG/1
TABLET ORAL
Qty: 60 TABLET | Refills: 2 | Status: SHIPPED | OUTPATIENT
Start: 2017-09-26 | End: 2017-11-15 | Stop reason: SDUPTHER

## 2017-10-23 ENCOUNTER — OFFICE VISIT (OUTPATIENT)
Dept: NEUROLOGY | Facility: CLINIC | Age: 82
End: 2017-10-23
Payer: MEDICARE

## 2017-10-23 VITALS
DIASTOLIC BLOOD PRESSURE: 64 MMHG | SYSTOLIC BLOOD PRESSURE: 126 MMHG | WEIGHT: 201.06 LBS | RESPIRATION RATE: 14 BRPM | HEIGHT: 62 IN | HEART RATE: 62 BPM | BODY MASS INDEX: 37 KG/M2

## 2017-10-23 DIAGNOSIS — I69.90 LATE EFFECTS OF CVA (CEREBROVASCULAR ACCIDENT): ICD-10-CM

## 2017-10-23 DIAGNOSIS — M48.061 SPINAL STENOSIS OF LUMBAR REGION WITHOUT NEUROGENIC CLAUDICATION: ICD-10-CM

## 2017-10-23 DIAGNOSIS — G25.0 BENIGN ESSENTIAL TREMOR: Primary | ICD-10-CM

## 2017-10-23 PROCEDURE — 99999 PR STA SHADOW: CPT | Mod: PBBFAC,,, | Performed by: PSYCHIATRY & NEUROLOGY

## 2017-10-23 PROCEDURE — 99999 PR PBB SHADOW E&M-EST. PATIENT-LVL III: CPT | Mod: PBBFAC,,, | Performed by: PSYCHIATRY & NEUROLOGY

## 2017-10-23 PROCEDURE — 99213 OFFICE O/P EST LOW 20 MIN: CPT | Mod: PBBFAC | Performed by: PSYCHIATRY & NEUROLOGY

## 2017-10-23 PROCEDURE — 99214 OFFICE O/P EST MOD 30 MIN: CPT | Mod: S$PBB | Performed by: PSYCHIATRY & NEUROLOGY

## 2017-10-23 NOTE — PROGRESS NOTES
HPI : Alysia Ross is a 86 y.o. female with HTN, hyperlipidemia and remote CVA impacting speech now complaining of 6 months of speech changes (word finding difficulty) (with no new changes by MRI-old lacunar change only)  and tremor with activities/ posture only. Looks more like essential tremor  with no additional Parkinson's feature other than jaw tremor.   A convulsive syncope provoked by maintained upright position during syncope in 4/2016.   No further fainting spells. Tremor is very well controlled- not bothersome and only rarely noted by patient with writing.   She brings a home BP log which shows BP in the 110s /60s.   She reports a good mood, anxiety is well controlled. Speech is unchanged.   Tried neuropathic cream which helped somewhat.   Back is more obvious with standing- has help in the home with housework due to this and has meals on wheels  Review of Systems   Constitutional: Negative for fever.   HENT: Negative for nosebleeds.    Eyes: Negative for double vision.   Respiratory: Negative for hemoptysis.    Cardiovascular: Negative for leg swelling.   Gastrointestinal: Negative for blood in stool.   Genitourinary: Negative for hematuria.   Musculoskeletal: Positive for back pain.        Back pain is the same   Skin: Negative for rash.   Neurological: Positive for tremors.   Psychiatric/Behavioral: The patient does not have insomnia.          Gen Appearance, well developed/nourished in no apparent distress  CV: 2+ distal pulses with no edema or swelling  Neuro:  MS: Awake, alert, . Sustains attention. Recent/remote memory intact, Language is full to spontaneous speech/comprehension. Fund of Knowledge is full, and speech is normal today  CN:  PERRL, Extraoccular movements and visual fields are full. Normal facial sensation and strength, Hearing symmetric, Tongue and Palate are midline and strong. Shoulder Shrug symmetric and strong.  Motor: Normal bulk, tone, no abnormal movements at rest and no  bradykinesia except for an occassional tongue smacking noted prior is not noted today-- no jaw tremor. Tremor with outstretched hands and action only, very mild 5/5 strength bilateral upper/lower extremities with 2+ reflexes   Sensory: symmetric to temp, and vibration.  Romberg negative  Cerebellar: Finger-nose Rapid alternating movements intact  Gait: Normal stance, no ataxia. Posture is good, mild difficulty with rising from chair, no shuffling, no en bloc turning and no reduced arm swing (she does have reduction in walking speed today with arthralgic gait noted) Postural reflexes mildly impaired  She is using a cane.    Imagin/3/2016 CT head: No acute intracranial process.   Moderate chronic ischemic microvascular change.    EEG: Clinical Impression:  Normal awake and  Drowsy EEG.      Echo showed normal EF    Holter showed: 12 beat run of nonsustained atrial tachycardia.    EKG showed NSR    Labs:110 LDL 2017 labs    2017 MRI Brain: 1.  No MRI evidence of an acute intracranial abnormality.  2. Advanced atrophy and small vessel ischemic changes of the periventricular white matter.      2017 MRI Lumbar spine: 1.  Advanced facet degenerative changes with minimal anterolisthesis of L4 on L5 causing moderate acquired spinal stenosis.  Moderate right-sided foraminal encroachment.  2.  Degenerative changes at L2 and L3 with mild to moderate acquired spinal canal narrowing and foraminal encroachment.  3.  Degenerative disc bulge greater to the left at L1-L2.    Assessment/Plan: Alysia Ross is a 86 y.o. female with HTN, hyperlipidemia and remote CVA impacting speech now complaining of 6 months of speech changes (word finding difficulty) (with no new changes by MRI-old lacunar change only)  and tremor with activities/ posture only. Looks more like essential tremor  with no additional Parkinson's feature other than jaw tremor.   A convulsive syncope provoked by maintained upright position during syncope in  4/2016.     I recommend:     1. EEG, neuro-exam,  and CT were reassuring. No further neurological work up needed. Syncope spells likely vasovagally mediated. No further spells.   2. Metoprolol (given by another provider for atrial tach) is also helping tremor. Also continue Primidone 50mg TID for tremor which is well controlled.   3. For Lumbar pain: offered pain management consult, but she declined. Neuropathic cream helped somewhat (note moderate spinal stenosis by Lumbar 1/2017) . PT helped prior  4. Speech changes are responding to Primidone/ may be tremor related. Vs Late effect CVA (dystonia) / repeat imaging benign.   5. Clonazepam may be helping jaw tremor- now prescribed by PCP  6. Remote History of CVA noted-- For stroke prevention: Continue treatments for  HTN, Dyslipidemia (LDL goal less than 100-reviewed)and continue ASA daily  RTC 6 months

## 2017-11-15 ENCOUNTER — OFFICE VISIT (OUTPATIENT)
Dept: FAMILY MEDICINE | Facility: CLINIC | Age: 82
End: 2017-11-15
Payer: MEDICARE

## 2017-11-15 VITALS
DIASTOLIC BLOOD PRESSURE: 62 MMHG | HEIGHT: 62 IN | RESPIRATION RATE: 20 BRPM | SYSTOLIC BLOOD PRESSURE: 130 MMHG | HEART RATE: 62 BPM | BODY MASS INDEX: 37.61 KG/M2 | WEIGHT: 204.38 LBS

## 2017-11-15 DIAGNOSIS — R60.9 DEPENDENT EDEMA: ICD-10-CM

## 2017-11-15 DIAGNOSIS — I10 HYPERTENSION, UNSPECIFIED TYPE: Primary | ICD-10-CM

## 2017-11-15 DIAGNOSIS — E78.5 HYPERLIPIDEMIA, UNSPECIFIED HYPERLIPIDEMIA TYPE: ICD-10-CM

## 2017-11-15 DIAGNOSIS — M81.0 OSTEOPOROSIS, UNSPECIFIED OSTEOPOROSIS TYPE, UNSPECIFIED PATHOLOGICAL FRACTURE PRESENCE: ICD-10-CM

## 2017-11-15 DIAGNOSIS — E87.6 HYPOKALEMIA: ICD-10-CM

## 2017-11-15 DIAGNOSIS — I10 ESSENTIAL HYPERTENSION: ICD-10-CM

## 2017-11-15 DIAGNOSIS — R25.1 TREMOR: ICD-10-CM

## 2017-11-15 DIAGNOSIS — M19.90 OSTEOARTHRITIS, UNSPECIFIED OSTEOARTHRITIS TYPE, UNSPECIFIED SITE: ICD-10-CM

## 2017-11-15 DIAGNOSIS — Z12.39 BREAST CANCER SCREENING: ICD-10-CM

## 2017-11-15 DIAGNOSIS — F32.A DEPRESSION, UNSPECIFIED DEPRESSION TYPE: ICD-10-CM

## 2017-11-15 DIAGNOSIS — Z12.31 ENCOUNTER FOR SCREENING MAMMOGRAM FOR MALIGNANT NEOPLASM OF BREAST: ICD-10-CM

## 2017-11-15 LAB
ALBUMIN SERPL BCP-MCNC: 3.3 G/DL
ALP SERPL-CCNC: 78 U/L
ALT SERPL W/O P-5'-P-CCNC: 14 U/L
ANION GAP SERPL CALC-SCNC: 9 MMOL/L
AST SERPL-CCNC: 24 U/L
BILIRUB SERPL-MCNC: 0.3 MG/DL
BUN SERPL-MCNC: 20 MG/DL
CALCIUM SERPL-MCNC: 9.9 MG/DL
CHLORIDE SERPL-SCNC: 101 MMOL/L
CO2 SERPL-SCNC: 29 MMOL/L
CREAT SERPL-MCNC: 1.3 MG/DL
EST. GFR  (AFRICAN AMERICAN): 43 ML/MIN/1.73 M^2
EST. GFR  (NON AFRICAN AMERICAN): 37 ML/MIN/1.73 M^2
GLUCOSE SERPL-MCNC: 108 MG/DL
POTASSIUM SERPL-SCNC: 4.1 MMOL/L
PROT SERPL-MCNC: 7.7 G/DL
SODIUM SERPL-SCNC: 139 MMOL/L

## 2017-11-15 PROCEDURE — 99213 OFFICE O/P EST LOW 20 MIN: CPT | Mod: PBBFAC | Performed by: FAMILY MEDICINE

## 2017-11-15 PROCEDURE — 80053 COMPREHEN METABOLIC PANEL: CPT

## 2017-11-15 PROCEDURE — 36415 COLL VENOUS BLD VENIPUNCTURE: CPT | Mod: PBBFAC | Performed by: FAMILY MEDICINE

## 2017-11-15 PROCEDURE — 99214 OFFICE O/P EST MOD 30 MIN: CPT | Mod: S$PBB | Performed by: FAMILY MEDICINE

## 2017-11-15 PROCEDURE — 99999 PR PBB SHADOW E&M-EST. PATIENT-LVL III: CPT | Mod: PBBFAC,,, | Performed by: FAMILY MEDICINE

## 2017-11-15 PROCEDURE — 99999 PR STA SHADOW: CPT | Mod: PBBFAC,,, | Performed by: FAMILY MEDICINE

## 2017-11-15 RX ORDER — PRIMIDONE 50 MG/1
50 TABLET ORAL 3 TIMES DAILY
Qty: 90 TABLET | Refills: 5 | Status: SHIPPED | OUTPATIENT
Start: 2017-11-15 | End: 2018-04-19 | Stop reason: SDUPTHER

## 2017-11-15 RX ORDER — CLONAZEPAM 0.5 MG/1
0.25 TABLET ORAL DAILY
Qty: 45 TABLET | Refills: 1 | Status: SHIPPED | OUTPATIENT
Start: 2017-11-15 | End: 2018-05-17 | Stop reason: SDUPTHER

## 2017-11-15 RX ORDER — METOPROLOL SUCCINATE 25 MG/1
25 TABLET, EXTENDED RELEASE ORAL DAILY
Qty: 30 TABLET | Refills: 5 | Status: SHIPPED | OUTPATIENT
Start: 2017-11-15 | End: 2018-06-18 | Stop reason: SDUPTHER

## 2017-11-15 RX ORDER — AMLODIPINE BESYLATE 2.5 MG/1
2.5 TABLET ORAL DAILY
Qty: 30 TABLET | Refills: 5 | Status: SHIPPED | OUTPATIENT
Start: 2017-11-15 | End: 2018-06-18 | Stop reason: SDUPTHER

## 2017-11-15 RX ORDER — SPIRONOLACTONE AND HYDROCHLOROTHIAZIDE 25; 25 MG/1; MG/1
1 TABLET ORAL DAILY
Qty: 30 TABLET | Refills: 5 | Status: SHIPPED | OUTPATIENT
Start: 2017-11-15 | End: 2018-06-04 | Stop reason: SDUPTHER

## 2017-11-15 RX ORDER — POTASSIUM CHLORIDE 20 MEQ/1
20 TABLET, EXTENDED RELEASE ORAL DAILY
Qty: 30 TABLET | Refills: 5 | Status: SHIPPED | OUTPATIENT
Start: 2017-11-15 | End: 2018-06-04 | Stop reason: SDUPTHER

## 2017-11-15 RX ORDER — SIMVASTATIN 40 MG/1
40 TABLET, FILM COATED ORAL NIGHTLY
Qty: 30 TABLET | Refills: 5 | Status: SHIPPED | OUTPATIENT
Start: 2017-11-15 | End: 2018-06-18 | Stop reason: SDUPTHER

## 2017-11-15 RX ORDER — ALENDRONATE SODIUM 70 MG/1
70 TABLET ORAL
Qty: 4 TABLET | Refills: 11 | Status: SHIPPED | OUTPATIENT
Start: 2017-11-15 | End: 2018-06-18 | Stop reason: SDUPTHER

## 2017-11-15 RX ORDER — CITALOPRAM 10 MG/1
10 TABLET ORAL DAILY
Qty: 30 TABLET | Refills: 5 | Status: SHIPPED | OUTPATIENT
Start: 2017-11-15 | End: 2018-06-04 | Stop reason: SDUPTHER

## 2017-11-15 RX ORDER — TRAMADOL HYDROCHLORIDE 50 MG/1
50 TABLET ORAL EVERY 6 HOURS PRN
Qty: 60 TABLET | Refills: 2 | Status: SHIPPED | OUTPATIENT
Start: 2017-11-15 | End: 2018-06-18 | Stop reason: SDUPTHER

## 2017-11-15 RX ORDER — LISINOPRIL 40 MG/1
40 TABLET ORAL DAILY
Qty: 30 TABLET | Refills: 5 | Status: SHIPPED | OUTPATIENT
Start: 2017-11-15 | End: 2018-06-18 | Stop reason: SDUPTHER

## 2017-11-15 NOTE — PROGRESS NOTES
Subjective:       Patient ID: Alysia Ross is a 86 y.o. female.    Chief Complaint: Follow-up (6 mo)    Pt is a 86 y.o. female who presents for evaluation and management of   Encounter Diagnoses   Name Primary?    Depression, unspecified depression type     Hypertension, unspecified type Yes    Osteoporosis, unspecified osteoporosis type, unspecified pathological fracture presence     Essential hypertension     Hypokalemia     Tremor     Hyperlipidemia, unspecified hyperlipidemia type     Dependent edema     Osteoarthritis, unspecified osteoarthritis type, unspecified site    .  Doing well on current meds. Denies any side effects. Prevention is up to date.  Review of Systems   Constitutional: Negative for chills and fever.   Respiratory: Negative for shortness of breath.    Cardiovascular: Negative for chest pain and palpitations.   Gastrointestinal: Negative for abdominal pain, blood in stool, constipation and nausea.   Genitourinary: Negative for difficulty urinating.   Psychiatric/Behavioral: Negative for dysphoric mood, sleep disturbance and suicidal ideas. The patient is not nervous/anxious.        Objective:      Physical Exam   Constitutional: She is oriented to person, place, and time. She appears well-developed and well-nourished.   HENT:   Head: Normocephalic and atraumatic.   Right Ear: External ear normal.   Left Ear: External ear normal.   Nose: Nose normal.   Mouth/Throat: Oropharynx is clear and moist.   Eyes: EOM are normal. Pupils are equal, round, and reactive to light.   Neck: Normal range of motion. Neck supple. No JVD present. No tracheal deviation present. No thyromegaly present.   Cardiovascular: Normal rate, normal heart sounds and intact distal pulses.    No murmur heard.  Pulmonary/Chest: Effort normal and breath sounds normal. No respiratory distress. She has no wheezes. She has no rales. She exhibits no tenderness.   Abdominal: Soft. Bowel sounds are normal. She exhibits  no distension and no mass. There is no tenderness. There is no rebound and no guarding.   Musculoskeletal: Normal range of motion. She exhibits no edema or tenderness.   Lymphadenopathy:     She has no cervical adenopathy.   Neurological: She is alert and oriented to person, place, and time. She has normal reflexes. She displays normal reflexes. No cranial nerve deficit. She exhibits normal muscle tone. Coordination normal.   Skin: Skin is warm and dry. No rash noted. No erythema. No pallor.   Psychiatric: She has a normal mood and affect. Her behavior is normal. Judgment and thought content normal.       Assessment:       1. Hypertension, unspecified type    2. Depression, unspecified depression type    3. Osteoporosis, unspecified osteoporosis type, unspecified pathological fracture presence    4. Essential hypertension    5. Hypokalemia    6. Tremor    7. Hyperlipidemia, unspecified hyperlipidemia type    8. Dependent edema    9. Osteoarthritis, unspecified osteoarthritis type, unspecified site        Plan:   Alysia ROBERTS was seen today for follow-up.    Diagnoses and all orders for this visit:    Hypertension, unspecified type  -     Comprehensive metabolic panel; Future    Depression, unspecified depression type  -     clonazePAM (KLONOPIN) 0.5 MG tablet; Take 0.5 tablets (0.25 mg total) by mouth once daily.  -     citalopram (CELEXA) 10 MG tablet; Take 1 tablet (10 mg total) by mouth once daily.    Osteoporosis, unspecified osteoporosis type, unspecified pathological fracture presence  -     alendronate (FOSAMAX) 70 MG tablet; Take 1 tablet (70 mg total) by mouth every 7 days.    Essential hypertension  -     amLODIPine (NORVASC) 2.5 MG tablet; Take 1 tablet (2.5 mg total) by mouth once daily.  -     lisinopril (PRINIVIL,ZESTRIL) 40 MG tablet; Take 1 tablet (40 mg total) by mouth once daily.  -     metoprolol succinate (TOPROL-XL) 25 MG 24 hr tablet; Take 1 tablet (25 mg total) by mouth once daily.  -      spironolactone-hydrochlorothiazide 25-25mg (ALDACTAZIDE) 25-25 mg Tab; Take 1 tablet by mouth once daily.    Hypokalemia  -     potassium chloride SA (KLOR-CON M20) 20 MEQ tablet; Take 1 tablet (20 mEq total) by mouth once daily.    Tremor  -     primidone (MYSOLINE) 50 MG Tab; Take 1 tablet (50 mg total) by mouth 3 (three) times daily.    Hyperlipidemia, unspecified hyperlipidemia type  -     simvastatin (ZOCOR) 40 MG tablet; Take 1 tablet (40 mg total) by mouth every evening.    Dependent edema  -     spironolactone-hydrochlorothiazide 25-25mg (ALDACTAZIDE) 25-25 mg Tab; Take 1 tablet by mouth once daily.    Osteoarthritis, unspecified osteoarthritis type, unspecified site  -     traMADol (ULTRAM) 50 mg tablet; Take 1 tablet (50 mg total) by mouth every 6 (six) hours as needed.    RTC 6 months     No Follow-up on file.

## 2018-03-28 DIAGNOSIS — M19.90 OSTEOARTHRITIS, UNSPECIFIED OSTEOARTHRITIS TYPE, UNSPECIFIED SITE: ICD-10-CM

## 2018-03-29 RX ORDER — TRAMADOL HYDROCHLORIDE 50 MG/1
TABLET ORAL
Qty: 60 TABLET | Refills: 2 | Status: SHIPPED | OUTPATIENT
Start: 2018-03-29 | End: 2018-04-19 | Stop reason: SDUPTHER

## 2018-04-19 ENCOUNTER — OFFICE VISIT (OUTPATIENT)
Dept: NEUROLOGY | Facility: CLINIC | Age: 83
End: 2018-04-19
Payer: MEDICARE

## 2018-04-19 VITALS
RESPIRATION RATE: 14 BRPM | DIASTOLIC BLOOD PRESSURE: 64 MMHG | SYSTOLIC BLOOD PRESSURE: 120 MMHG | WEIGHT: 207 LBS | BODY MASS INDEX: 36.68 KG/M2 | HEIGHT: 63 IN | HEART RATE: 64 BPM

## 2018-04-19 DIAGNOSIS — E78.5 DYSLIPIDEMIA: ICD-10-CM

## 2018-04-19 DIAGNOSIS — N18.30 CKD (CHRONIC KIDNEY DISEASE), STAGE III: ICD-10-CM

## 2018-04-19 DIAGNOSIS — Z86.73 HISTORY OF CVA (CEREBROVASCULAR ACCIDENT): ICD-10-CM

## 2018-04-19 DIAGNOSIS — F32.A DEPRESSION, UNSPECIFIED DEPRESSION TYPE: ICD-10-CM

## 2018-04-19 DIAGNOSIS — R25.1 TREMOR: Primary | ICD-10-CM

## 2018-04-19 PROCEDURE — 99213 OFFICE O/P EST LOW 20 MIN: CPT | Mod: PBBFAC | Performed by: NURSE PRACTITIONER

## 2018-04-19 PROCEDURE — 99999 PR PBB SHADOW E&M-EST. PATIENT-LVL III: CPT | Mod: PBBFAC,,, | Performed by: NURSE PRACTITIONER

## 2018-04-19 PROCEDURE — 99999 PR STA SHADOW: CPT | Mod: PBBFAC,,, | Performed by: NURSE PRACTITIONER

## 2018-04-19 PROCEDURE — 99214 OFFICE O/P EST MOD 30 MIN: CPT | Mod: S$PBB | Performed by: NURSE PRACTITIONER

## 2018-04-19 RX ORDER — PRIMIDONE 50 MG/1
50 TABLET ORAL 3 TIMES DAILY
Qty: 90 TABLET | Refills: 5 | Status: SHIPPED | OUTPATIENT
Start: 2018-04-19 | End: 2018-06-18 | Stop reason: SDUPTHER

## 2018-04-19 NOTE — PROGRESS NOTES
HPI : Alysia Ross is a 86 y.o. female with HTN, hyperlipidemia and remote CVA impacting speech now complaining of 6 months of speech changes (word finding difficulty) (with no new changes by MRI-old lacunar change only)  and tremor with activities/ posture only. Looks more like essential tremor  with no additional Parkinson's feature other than jaw tremor.   A convulsive syncope provoked by maintained upright position during syncope in 4/2016.     Patient presents today for a follow up visit. She denies any further episodes of syncope.     Her hand and jaw tremor are well controlled currently.     Mood is euthymic, and anxiety is well controlled. Speech complaints remain unchanged-ongoing since her CVA.     She brings a BP log from home with SBP's ranging from 100's-110's.     Review of Systems   Constitutional: Negative for fever.   HENT: Negative for nosebleeds.    Eyes: Negative for double vision.   Respiratory: Negative for hemoptysis.    Cardiovascular: Negative for leg swelling.   Gastrointestinal: Negative for blood in stool.   Genitourinary: Negative for hematuria.   Musculoskeletal: Positive for back pain.        Back pain is the same   Skin: Negative for rash.   Neurological: Positive for tremors.   Psychiatric/Behavioral: The patient does not have insomnia.      Gen Appearance, well developed/nourished in no apparent distress  CV: 2+ distal pulses with no edema or swelling  Neuro:  MS: Awake, alert, . Sustains attention. Recent/remote memory intact, Language is full to spontaneous speech/comprehension. Fund of Knowledge is full, and speech is normal today  CN:  PERRL, Extraoccular movements and visual fields are full. Normal facial sensation and strength, Hearing symmetric, Tongue and Palate are midline and strong. Shoulder Shrug symmetric and strong.  Motor: Normal bulk, tone, no abnormal movements at rest and no bradykinesia except for an occassional tongue smacking noted prior is not noted  today-- no jaw tremor. Tremor with outstretched hands and action only, very mild 5/5 strength bilateral upper/lower extremities with 2+ reflexes   Sensory: symmetric to temp, and vibration.  Romberg negative  Cerebellar: Finger-nose Rapid alternating movements intact  Gait: Normal stance, no ataxia. Posture is good, mild difficulty with rising from chair, no shuffling, no en bloc turning and no reduced arm swing (she does have reduction in walking speed today with arthralgic gait noted) Postural reflexes mildly impaired. She is using a cane.    Imagin/3/2016 CT head:   No acute intracranial process.   Moderate chronic ischemic microvascular change.    2017 MRI Brain:   1.  No MRI evidence of an acute intracranial abnormality.  2. Advanced atrophy and small vessel ischemic changes of the periventricular white matter.    2017 MRI Lumbar spine:   1.  Advanced facet degenerative changes with minimal anterolisthesis of L4 on L5 causing moderate acquired spinal stenosis.  Moderate right-sided foraminal encroachment.  2.  Degenerative changes at L2 and L3 with mild to moderate acquired spinal canal narrowing and foraminal encroachment.  3.  Degenerative disc bulge greater to the left at L1-L2.    EEG: Clinical Impression:  Normal awake and  Drowsy EEG.      Echo showed normal EF    Holter showed: 12 beat run of nonsustained atrial tachycardia.    EKG showed NSR    Labs:  111 LDL 2017 labs    Assessment/Plan: Alysia Ross is a 86 y.o. female with HTN, hyperlipidemia and remote CVA impacting speech now complaining of 6 months of speech changes (word finding difficulty) (with no new changes by MRI-old lacunar change only)  and tremor with activities/ posture only. Looks more like essential tremor  with no additional Parkinson's feature other than jaw tremor.   A convulsive syncope provoked by maintained upright position during syncope in 2016.     I recommend:   1. EEG, neuro-exam,  and CT were reassuring.  No further neurological work up needed. Syncope spells likely vasovagally mediated. No further spells.   2. Metoprolol (given by another provider for atrial tach) is also helping tremor. Also continue Primidone 50mg TID for tremor which is well controlled.   3. For Lumbar pain: offered pain management consult again, but she declined. Neuropathic cream helped somewhat (note moderate spinal stenosis by Lumbar 1/2017) . PT helped prior; however, she does not wish to repeat.   4. Speech changes are responding to Primidone/ may be tremor related. Vs Late effect CVA (dystonia) / repeat imaging benign.   5. Clonazepam may be helping jaw tremor- now prescribed by PCP  6. Remote History of CVA noted-- For stroke prevention: Continue treatments for  HTN, Dyslipidemia (LDL goal less than 510-[id number] goal 5/2017) and continue ASA daily.  7. Note CKD.     RTC 6 months

## 2018-05-17 DIAGNOSIS — F32.A DEPRESSION, UNSPECIFIED DEPRESSION TYPE: ICD-10-CM

## 2018-05-17 RX ORDER — CLONAZEPAM 0.5 MG/1
TABLET ORAL
Qty: 45 TABLET | Refills: 1 | Status: SHIPPED | OUTPATIENT
Start: 2018-05-17 | End: 2018-06-18 | Stop reason: SDUPTHER

## 2018-06-04 DIAGNOSIS — R60.9 DEPENDENT EDEMA: ICD-10-CM

## 2018-06-04 DIAGNOSIS — E87.6 HYPOKALEMIA: ICD-10-CM

## 2018-06-04 DIAGNOSIS — F32.A DEPRESSION, UNSPECIFIED DEPRESSION TYPE: ICD-10-CM

## 2018-06-04 DIAGNOSIS — I10 ESSENTIAL HYPERTENSION: ICD-10-CM

## 2018-06-04 RX ORDER — SPIRONOLACTONE AND HYDROCHLOROTHIAZIDE 25; 25 MG/1; MG/1
TABLET ORAL
Qty: 30 TABLET | Refills: 5 | Status: SHIPPED | OUTPATIENT
Start: 2018-06-04 | End: 2018-06-18 | Stop reason: SDUPTHER

## 2018-06-04 RX ORDER — POTASSIUM CHLORIDE 1500 MG/1
TABLET, EXTENDED RELEASE ORAL
Qty: 30 TABLET | Refills: 5 | Status: SHIPPED | OUTPATIENT
Start: 2018-06-04 | End: 2018-06-18 | Stop reason: SDUPTHER

## 2018-06-04 RX ORDER — CITALOPRAM 10 MG/1
TABLET ORAL
Qty: 30 TABLET | Refills: 5 | Status: SHIPPED | OUTPATIENT
Start: 2018-06-04 | End: 2018-06-18 | Stop reason: SDUPTHER

## 2018-06-05 ENCOUNTER — TELEPHONE (OUTPATIENT)
Dept: FAMILY MEDICINE | Facility: CLINIC | Age: 83
End: 2018-06-05

## 2018-06-06 NOTE — TELEPHONE ENCOUNTER
Notified pharmacist ( Kelle)  at [facility] of recommendations.     Appointment scheduled with Dr. Aguayo on 6/18/2018.

## 2018-06-18 ENCOUNTER — OFFICE VISIT (OUTPATIENT)
Dept: FAMILY MEDICINE | Facility: CLINIC | Age: 83
End: 2018-06-18
Payer: MEDICARE

## 2018-06-18 VITALS
WEIGHT: 209.19 LBS | BODY MASS INDEX: 37.06 KG/M2 | DIASTOLIC BLOOD PRESSURE: 64 MMHG | RESPIRATION RATE: 16 BRPM | SYSTOLIC BLOOD PRESSURE: 158 MMHG | HEART RATE: 80 BPM

## 2018-06-18 DIAGNOSIS — Z86.73 HISTORY OF CVA (CEREBROVASCULAR ACCIDENT): ICD-10-CM

## 2018-06-18 DIAGNOSIS — I10 ESSENTIAL HYPERTENSION: ICD-10-CM

## 2018-06-18 DIAGNOSIS — R25.1 TREMOR: ICD-10-CM

## 2018-06-18 DIAGNOSIS — E78.5 HYPERLIPIDEMIA, UNSPECIFIED HYPERLIPIDEMIA TYPE: ICD-10-CM

## 2018-06-18 DIAGNOSIS — M81.0 OSTEOPOROSIS, UNSPECIFIED OSTEOPOROSIS TYPE, UNSPECIFIED PATHOLOGICAL FRACTURE PRESENCE: ICD-10-CM

## 2018-06-18 DIAGNOSIS — F32.A DEPRESSION, UNSPECIFIED DEPRESSION TYPE: ICD-10-CM

## 2018-06-18 DIAGNOSIS — R60.9 DEPENDENT EDEMA: ICD-10-CM

## 2018-06-18 DIAGNOSIS — M19.90 OSTEOARTHRITIS, UNSPECIFIED OSTEOARTHRITIS TYPE, UNSPECIFIED SITE: ICD-10-CM

## 2018-06-18 DIAGNOSIS — E87.6 HYPOKALEMIA: ICD-10-CM

## 2018-06-18 DIAGNOSIS — E78.5 DYSLIPIDEMIA: Primary | ICD-10-CM

## 2018-06-18 DIAGNOSIS — E55.9 VITAMIN D DEFICIENCY DISEASE: ICD-10-CM

## 2018-06-18 DIAGNOSIS — E66.01 SEVERE OBESITY (BMI 35.0-35.9 WITH COMORBIDITY): ICD-10-CM

## 2018-06-18 LAB
25(OH)D3+25(OH)D2 SERPL-MCNC: 63 NG/ML
ALBUMIN SERPL BCP-MCNC: 3.3 G/DL
ALP SERPL-CCNC: 75 U/L
ALT SERPL W/O P-5'-P-CCNC: 12 U/L
ANION GAP SERPL CALC-SCNC: 7 MMOL/L
AST SERPL-CCNC: 19 U/L
BILIRUB SERPL-MCNC: 0.6 MG/DL
BUN SERPL-MCNC: 21 MG/DL
CALCIUM SERPL-MCNC: 9.8 MG/DL
CHLORIDE SERPL-SCNC: 102 MMOL/L
CHOLEST SERPL-MCNC: 165 MG/DL
CHOLEST/HDLC SERPL: 4.7 {RATIO}
CO2 SERPL-SCNC: 32 MMOL/L
CREAT SERPL-MCNC: 1.1 MG/DL
EST. GFR  (AFRICAN AMERICAN): 52 ML/MIN/1.73 M^2
EST. GFR  (NON AFRICAN AMERICAN): 45 ML/MIN/1.73 M^2
GLUCOSE SERPL-MCNC: 99 MG/DL
HDLC SERPL-MCNC: 35 MG/DL
HDLC SERPL: 21.2 %
LDLC SERPL CALC-MCNC: 111.2 MG/DL
NONHDLC SERPL-MCNC: 130 MG/DL
POTASSIUM SERPL-SCNC: 4.2 MMOL/L
PROT SERPL-MCNC: 7.3 G/DL
SODIUM SERPL-SCNC: 141 MMOL/L
TRIGL SERPL-MCNC: 94 MG/DL
TSH SERPL DL<=0.005 MIU/L-ACNC: 1.41 UIU/ML

## 2018-06-18 PROCEDURE — 99999 PR STA SHADOW: CPT | Mod: PBBFAC,,, | Performed by: FAMILY MEDICINE

## 2018-06-18 PROCEDURE — 82306 VITAMIN D 25 HYDROXY: CPT

## 2018-06-18 PROCEDURE — 84443 ASSAY THYROID STIM HORMONE: CPT

## 2018-06-18 PROCEDURE — 99214 OFFICE O/P EST MOD 30 MIN: CPT | Mod: S$PBB | Performed by: FAMILY MEDICINE

## 2018-06-18 PROCEDURE — 80053 COMPREHEN METABOLIC PANEL: CPT

## 2018-06-18 PROCEDURE — 99999 PR PBB SHADOW E&M-EST. PATIENT-LVL II: CPT | Mod: PBBFAC,,, | Performed by: FAMILY MEDICINE

## 2018-06-18 PROCEDURE — 99212 OFFICE O/P EST SF 10 MIN: CPT | Mod: PBBFAC | Performed by: FAMILY MEDICINE

## 2018-06-18 PROCEDURE — 80061 LIPID PANEL: CPT

## 2018-06-18 PROCEDURE — 36415 COLL VENOUS BLD VENIPUNCTURE: CPT | Mod: PBBFAC

## 2018-06-18 RX ORDER — PRIMIDONE 50 MG/1
50 TABLET ORAL 3 TIMES DAILY
Qty: 270 TABLET | Refills: 1 | Status: SHIPPED | OUTPATIENT
Start: 2018-06-18 | End: 2019-05-01 | Stop reason: SDUPTHER

## 2018-06-18 RX ORDER — TRAMADOL HYDROCHLORIDE 50 MG/1
50 TABLET ORAL EVERY 6 HOURS PRN
Qty: 180 TABLET | Refills: 1 | Status: SHIPPED | OUTPATIENT
Start: 2018-06-18 | End: 2019-02-28 | Stop reason: SDUPTHER

## 2018-06-18 RX ORDER — CLONAZEPAM 0.5 MG/1
TABLET ORAL
Qty: 45 TABLET | Refills: 1 | Status: SHIPPED | OUTPATIENT
Start: 2018-06-18 | End: 2019-02-28 | Stop reason: SDUPTHER

## 2018-06-18 RX ORDER — CITALOPRAM 10 MG/1
10 TABLET ORAL DAILY
Qty: 90 TABLET | Refills: 1 | Status: SHIPPED | OUTPATIENT
Start: 2018-06-18 | End: 2019-05-29 | Stop reason: SDUPTHER

## 2018-06-18 RX ORDER — LISINOPRIL 40 MG/1
40 TABLET ORAL DAILY
Qty: 90 TABLET | Refills: 1 | Status: SHIPPED | OUTPATIENT
Start: 2018-06-18 | End: 2019-01-31 | Stop reason: SDUPTHER

## 2018-06-18 RX ORDER — METOPROLOL SUCCINATE 25 MG/1
25 TABLET, EXTENDED RELEASE ORAL DAILY
Qty: 90 TABLET | Refills: 1 | Status: SHIPPED | OUTPATIENT
Start: 2018-06-18 | End: 2019-01-31 | Stop reason: SDUPTHER

## 2018-06-18 RX ORDER — POTASSIUM CHLORIDE 1500 MG/1
20 TABLET, EXTENDED RELEASE ORAL DAILY
Qty: 90 TABLET | Refills: 1 | Status: SHIPPED | OUTPATIENT
Start: 2018-06-18 | End: 2020-01-22 | Stop reason: SDUPTHER

## 2018-06-18 RX ORDER — AMLODIPINE BESYLATE 2.5 MG/1
2.5 TABLET ORAL DAILY
Qty: 30 TABLET | Refills: 5 | Status: SHIPPED | OUTPATIENT
Start: 2018-06-18 | End: 2019-02-28 | Stop reason: SDUPTHER

## 2018-06-18 RX ORDER — ALENDRONATE SODIUM 70 MG/1
70 TABLET ORAL
Qty: 12 TABLET | Refills: 1 | Status: SHIPPED | OUTPATIENT
Start: 2018-06-18 | End: 2019-05-29 | Stop reason: SDUPTHER

## 2018-06-18 RX ORDER — SIMVASTATIN 40 MG/1
40 TABLET, FILM COATED ORAL NIGHTLY
Qty: 90 TABLET | Refills: 1 | Status: SHIPPED | OUTPATIENT
Start: 2018-06-18 | End: 2019-02-28 | Stop reason: SDUPTHER

## 2018-06-18 RX ORDER — SPIRONOLACTONE AND HYDROCHLOROTHIAZIDE 25; 25 MG/1; MG/1
1 TABLET ORAL DAILY
Qty: 90 TABLET | Refills: 1 | Status: SHIPPED | OUTPATIENT
Start: 2018-06-18 | End: 2019-05-29 | Stop reason: SDUPTHER

## 2018-09-21 ENCOUNTER — OFFICE VISIT (OUTPATIENT)
Dept: NEUROLOGY | Facility: CLINIC | Age: 83
End: 2018-09-21
Payer: MEDICARE

## 2018-09-21 VITALS
WEIGHT: 209.88 LBS | HEART RATE: 62 BPM | HEIGHT: 62 IN | RESPIRATION RATE: 16 BRPM | DIASTOLIC BLOOD PRESSURE: 60 MMHG | BODY MASS INDEX: 38.62 KG/M2 | SYSTOLIC BLOOD PRESSURE: 110 MMHG

## 2018-09-21 DIAGNOSIS — I10 ESSENTIAL HYPERTENSION: ICD-10-CM

## 2018-09-21 DIAGNOSIS — I69.90 LATE EFFECTS OF CVA (CEREBROVASCULAR ACCIDENT): ICD-10-CM

## 2018-09-21 DIAGNOSIS — M48.061 SPINAL STENOSIS OF LUMBAR REGION WITHOUT NEUROGENIC CLAUDICATION: ICD-10-CM

## 2018-09-21 DIAGNOSIS — E78.5 DYSLIPIDEMIA: ICD-10-CM

## 2018-09-21 DIAGNOSIS — R25.1 TREMOR: Primary | ICD-10-CM

## 2018-09-21 PROCEDURE — 99214 OFFICE O/P EST MOD 30 MIN: CPT | Mod: S$PBB | Performed by: PSYCHIATRY & NEUROLOGY

## 2018-09-21 PROCEDURE — 99213 OFFICE O/P EST LOW 20 MIN: CPT | Mod: PBBFAC | Performed by: PSYCHIATRY & NEUROLOGY

## 2018-09-21 PROCEDURE — 99999 PR PBB SHADOW E&M-EST. PATIENT-LVL III: CPT | Mod: PBBFAC,,, | Performed by: PSYCHIATRY & NEUROLOGY

## 2018-09-21 PROCEDURE — 99999 PR STA SHADOW: CPT | Mod: PBBFAC,,, | Performed by: PSYCHIATRY & NEUROLOGY

## 2018-09-21 NOTE — PATIENT INSTRUCTIONS
Fitting Your Cane  Proper fitting helps you use your cane safely and effectively. When fitting the cane, stand up straight and wear the shoes you will normally use to walk. If your cane doesnt feel right, ask your doctor, nurse, or physical therapist (PT) to check the fit.     To check fit: Place the tip 2 inches in front and 6 inches to the side.       Getting to know your cane  A cane is often used after crutches or a walker. It helps with balance as you regain strength and mobility. Many different kinds of canes are available. Some have only one tip. Others have four tips to aid balance. Hold the cane on the unaffected (stronger or uninjured) side unless told otherwise.  The cane fits if:  · Your wrist is even with the handgrip when your arms hang at your sides.  · Your arm bends slightly at the elbow when you hold the handgrip.  Press the buttons to lengthen or shorten an adjustable metal cane. A wooden cane must be cut to the right height.     Precautions  · The cane should have nonskid rubber tip(s) to prevent slipping. Change tip(s) that look worn.  · Keep the cane away from your feet so you dont trip.   Date Last Reviewed: 11/14/2015 © 2000-2017 [employer]. [address] [place] [address], [place] [address]. All rights reserved. This information is not intended as a substitute for professional medical care. Always follow your healthcare professional's instructions.

## 2018-09-21 NOTE — PROGRESS NOTES
HPI :  Alysia Ross is a 86 y.o. female with HTN, hyperlipidemia and remote CVA impacting speech now complaining of 6 months of speech changes (word finding difficulty) (with no new changes by MRI-old lacunar change only)  and tremor with activities/ posture only. Looks more like essential tremor  with no additional Parkinson's feature other than jaw tremor.   A convulsive syncope provoked by maintained upright position during syncope in 4/2016.     The patient saw Sulma Meade in follow up at the last visit and no changes were made.  NO more fainting spells. Her BP has run from 110s/ 60s at home.    Her tremor is well controlled. She can eat well. She notes tremor with handwriting but this is not severe.  Facial tremor is well controlled. Her speech has been the same  Her back pain is still bothersome at times and she treats this with an OTC patch.  She has started to use a cane with walking.    Review of Systems   Constitutional: Negative for fever.   HENT: Negative for nosebleeds.    Eyes: Negative for double vision.   Respiratory: Negative for hemoptysis.    Cardiovascular: Negative for leg swelling.   Gastrointestinal: Negative for blood in stool.   Genitourinary: Negative for hematuria.   Musculoskeletal: Positive for back pain. Negative for falls.   Skin: Negative for rash.   Neurological: Positive for tremors.   Psychiatric/Behavioral: The patient does not have insomnia.          Gen Appearance, well developed/nourished in no apparent distress  CV: 2+ distal pulses with no edema or swelling  Neuro:  MS: Awake, alert, . Sustains attention. Recent/remote memory intact, Language is full to spontaneous speech/comprehension. Fund of Knowledge is full, and speech is normal today  CN:  PERRL, Extraoccular movements and visual fields are full. Normal facial sensation and strength, Hearing symmetric, Tongue and Palate are midline and strong. Shoulder Shrug symmetric and strong.  Motor: Normal bulk, tone, no  abnormal movements at rest and no bradykinesia except for an occassional tongue smacking noted prior is not noted today-- no jaw tremor. Tremor with outstretched hands and action only, very mild 5/5 strength bilateral upper/lower extremities with 2+ reflexes   Sensory: symmetric to temp, and vibration.  Romberg negative  Cerebellar: Finger-nose Rapid alternating movements intact  Gait: Normal stance, no ataxia. Posture is good, mild difficulty with rising from chair, no shuffling, no en bloc turning and no reduced arm swing (she does have reduction in walking speed today with arthralgic gait noted) Postural reflexes mildly impaired  She is using a cane.    Imagin/3/2016 CT head: No acute intracranial process.   Moderate chronic ischemic microvascular change.    EEG: Clinical Impression:  Normal awake and  Drowsy EEG.      Echo showed normal EF    Holter showed: 12 beat run of nonsustained atrial tachycardia.    EKG showed NSR    Labs:110 LDL 2017 labs  2018: , CMP reviewed    2017 MRI Brain: 1.  No MRI evidence of an acute intracranial abnormality.  2. Advanced atrophy and small vessel ischemic changes of the periventricular white matter.      2017 MRI Lumbar spine: 1.  Advanced facet degenerative changes with minimal anterolisthesis of L4 on L5 causing moderate acquired spinal stenosis.  Moderate right-sided foraminal encroachment.  2.  Degenerative changes at L2 and L3 with mild to moderate acquired spinal canal narrowing and foraminal encroachment.  3.  Degenerative disc bulge greater to the left at L1-L2.    Assessment/Plan: Alysia Ross is a 86 y.o. female with HTN, hyperlipidemia and remote CVA impacting speech now complaining of 6 months of speech changes (word finding difficulty) (with no new changes by MRI-old lacunar change only)  and tremor with activities/ posture only. Looks more like essential tremor (jaw tremor noted  with no additional Parkinson's features)  A convulsive  syncope provoked by maintained upright position during syncope in 4/2016.     I recommend:     1. EEG, neuro-exam,  and CT were reassuring for spells in 2016- Syncope spell likely vasovagally mediated and resolved  2. Metoprolol (given by another provider for atrial tach) is also helping tremor. Also continue Primidone 50mg TID for tremor which is well controlled.   3. For Lumbar pain: offered pain management consult, but she declines again today. Neuropathic cream helped somewhat (note moderate spinal stenosis by Lumbar 1/2017) . PT helped prior- monitor complaint  4. Speech changes are responding to Primidone/ may be tremor related. Vs Late effect CVA (dystonia) / repeat imaging benign prior  5. Clonazepam may be helping jaw tremor- now prescribed by PCP  6. Remote History of CVA noted-- For stroke prevention: Continue treatments for  HTN, Dyslipidemia (LDL goal less than 100-reviewed)and continue ASA daily  7. Agree with her using a cane for fall prevention. Instructions on use given  RTC 6 months

## 2018-10-12 ENCOUNTER — TELEPHONE (OUTPATIENT)
Dept: FAMILY MEDICINE | Facility: CLINIC | Age: 83
End: 2018-10-12

## 2018-12-05 ENCOUNTER — OFFICE VISIT (OUTPATIENT)
Dept: FAMILY MEDICINE | Facility: CLINIC | Age: 83
End: 2018-12-05
Payer: MEDICARE

## 2018-12-05 ENCOUNTER — TELEPHONE (OUTPATIENT)
Dept: FAMILY MEDICINE | Facility: CLINIC | Age: 83
End: 2018-12-05

## 2018-12-05 VITALS
HEART RATE: 60 BPM | BODY MASS INDEX: 33.98 KG/M2 | WEIGHT: 191.81 LBS | HEIGHT: 63 IN | DIASTOLIC BLOOD PRESSURE: 58 MMHG | SYSTOLIC BLOOD PRESSURE: 110 MMHG | RESPIRATION RATE: 18 BRPM

## 2018-12-05 DIAGNOSIS — B02.29 POST HERPETIC NEURALGIA: Primary | ICD-10-CM

## 2018-12-05 PROCEDURE — 99213 OFFICE O/P EST LOW 20 MIN: CPT | Mod: S$PBB | Performed by: FAMILY MEDICINE

## 2018-12-05 PROCEDURE — 99999 PR STA SHADOW: CPT | Mod: PBBFAC,,, | Performed by: FAMILY MEDICINE

## 2018-12-05 PROCEDURE — 99999 PR PBB SHADOW E&M-EST. PATIENT-LVL III: CPT | Mod: PBBFAC,,, | Performed by: FAMILY MEDICINE

## 2018-12-05 PROCEDURE — 99213 OFFICE O/P EST LOW 20 MIN: CPT | Mod: PBBFAC | Performed by: FAMILY MEDICINE

## 2018-12-05 RX ORDER — MECLIZINE HCL 12.5 MG 12.5 MG/1
TABLET ORAL
COMMUNITY
Start: 2018-11-20 | End: 2018-12-17

## 2018-12-05 RX ORDER — GENTAMICIN SULFATE 1 MG/G
OINTMENT TOPICAL
COMMUNITY
Start: 2018-11-19 | End: 2019-06-20

## 2018-12-05 RX ORDER — GENTAMICIN SULFATE 3 MG/ML
SOLUTION/ DROPS OPHTHALMIC
COMMUNITY
Start: 2018-11-25 | End: 2019-06-20

## 2018-12-05 RX ORDER — PREGABALIN 75 MG/1
75 CAPSULE ORAL 2 TIMES DAILY PRN
Qty: 60 CAPSULE | Refills: 1 | Status: SHIPPED | OUTPATIENT
Start: 2018-12-05 | End: 2019-06-17

## 2018-12-05 NOTE — TELEPHONE ENCOUNTER
----- Message from Ryan Bobby sent at 2018  9:10 AM CST -----  Contact: Patient  Alysia Ross  MRN: [id number]  : 3/1/1931  PCP: Carl Aguayo  Home Phone      [phone]  Work Phone      Not on file.  Mobile          Not on file.      MESSAGE: shingles (face) -- causing weakness -- has appt scheduled for Monday -- she is requesting to speak with Dr Aguayo' nurse    Call [phone]    PCP: Dede

## 2018-12-05 NOTE — TELEPHONE ENCOUNTER
Spoke to pt and she stated that she was seen by Dr. Colt Anderson 3 weeks ago and was dx with shingles. She stated that she is washing her face 4 times daily with dial soap, which she was instructed by Dr. Gregory to do so.   Pt also informed me that she has shingles in her left ear as well and is cleaning it with betadine 4 times daily.  Pt states that the spots on her face are drying out and not hurting her anymore, but the spots in her Left ear are still very painful and she is not sure what to do anymore.       Offered pt to come in today at 1pm to see Dr. Chi. Pt declined appt and stated that her daughter will have to drive her today and she does not get off until 1pm today.   Offered pt an appt today at 2:45pm with Dr. Chi, pt verbally accepted and agreed to appt.     Appt is scheduled for 2:45pm today.

## 2018-12-05 NOTE — PROGRESS NOTES
Subjective:       Patient ID: Alysia Ross is a 87 y.o. female.    Chief Complaint: shingles in Left ear    Pt is a 87 y.o. female who presents for evaluation and management of   Encounter Diagnosis   Name Primary?    Post herpetic neuralgia Yes   .Has been treated for Herp Zoster with DR. Colunga. Gave her acyclovir and steroids   She is mostly healed but c/o pain     Doing well on current meds. Denies any side effects. Prevention is up to date.    Review of Systems   Constitutional: Negative for fever.   Neurological:        Burning pain of tongue, face and ear        Objective:      Physical Exam   Constitutional: She is oriented to person, place, and time. She appears well-developed and well-nourished.   HENT:   Head: Normocephalic and atraumatic.   Right Ear: External ear normal.   Left Ear: External ear normal.   Nose: Nose normal.   Mouth/Throat: Oropharynx is clear and moist.   Eyes: EOM are normal. Pupils are equal, round, and reactive to light.   Neck: Normal range of motion. Neck supple. No JVD present. No tracheal deviation present. No thyromegaly present.   Cardiovascular: Normal rate, normal heart sounds and intact distal pulses.   No murmur heard.  Pulmonary/Chest: Effort normal and breath sounds normal. No respiratory distress. She has no wheezes. She has no rales. She exhibits no tenderness.   Abdominal: Soft. Bowel sounds are normal. She exhibits no distension and no mass. There is no tenderness. There is no rebound and no guarding.   Musculoskeletal: Normal range of motion. She exhibits no edema or tenderness.   Lymphadenopathy:     She has no cervical adenopathy.   Neurological: She is alert and oriented to person, place, and time. She has normal reflexes. She displays normal reflexes. No cranial nerve deficit. She exhibits normal muscle tone. Coordination normal.   Skin: Skin is warm and dry. No rash noted. No erythema. No pallor.   Mostly healed herpetic lesions    Psychiatric: She  has a normal mood and affect. Her behavior is normal. Judgment and thought content normal.       Assessment:       1. Post herpetic neuralgia        Plan:   Alysia ROBERTS was seen today for shingles in left ear.    Diagnoses and all orders for this visit:    Post herpetic neuralgia  -     pregabalin (LYRICA) 75 MG capsule; Take 1 capsule (75 mg total) by mouth 2 (two) times daily as needed (shingles).      Problem List Items Addressed This Visit     None      Visit Diagnoses     Post herpetic neuralgia    -  Primary    Relevant Medications    pregabalin (LYRICA) 75 MG capsule      Can increase to 2 po BID   Call if need new rx   Call if not helping     No Follow-up on file.

## 2018-12-07 ENCOUNTER — TELEPHONE (OUTPATIENT)
Dept: FAMILY MEDICINE | Facility: CLINIC | Age: 83
End: 2018-12-07

## 2018-12-07 NOTE — TELEPHONE ENCOUNTER
----- Message from Mercedes Ramos MA sent at 2018  1:24 PM CST -----  Contact: Julee - daughter   Alysia Ross  MRN: [id number]  : 3/1/1931  PCP: Carl Aguayo  Home Phone      [phone]  Work Phone      Not on file.  Mobile          Not on file.      MESSAGE:   told the pt to call and let him know if the medication for the shingles is working or not. It is working and she is starting to get better.  [phone]

## 2018-12-17 ENCOUNTER — OFFICE VISIT (OUTPATIENT)
Dept: INTERNAL MEDICINE | Facility: CLINIC | Age: 83
End: 2018-12-17
Payer: MEDICARE

## 2018-12-17 VITALS
SYSTOLIC BLOOD PRESSURE: 122 MMHG | BODY MASS INDEX: 34.73 KG/M2 | HEIGHT: 63 IN | OXYGEN SATURATION: 97 % | HEART RATE: 65 BPM | WEIGHT: 196 LBS | RESPIRATION RATE: 18 BRPM | DIASTOLIC BLOOD PRESSURE: 72 MMHG

## 2018-12-17 DIAGNOSIS — R23.8 SKIN BREAKDOWN: Primary | ICD-10-CM

## 2018-12-17 PROCEDURE — 99999 PR PBB SHADOW E&M-EST. PATIENT-LVL IV: CPT | Mod: PBBFAC,,, | Performed by: NURSE PRACTITIONER

## 2018-12-17 PROCEDURE — 99213 OFFICE O/P EST LOW 20 MIN: CPT | Mod: S$PBB,,, | Performed by: NURSE PRACTITIONER

## 2018-12-17 PROCEDURE — 99214 OFFICE O/P EST MOD 30 MIN: CPT | Mod: PBBFAC,PN | Performed by: NURSE PRACTITIONER

## 2018-12-17 NOTE — PROGRESS NOTES
"Subjective:       Patient ID: Alysia Ross is a 87 y.o. female.    Chief Complaint: sores (on buttocks area- that come and go PS:0)    Patient is known, to me and presents with   Chief Complaint   Patient presents with    sores     on buttocks area- that come and go PS:0   .  Denies chest pain and shortness of breath.  Patient who is new to me presents with breakdown to buttocks. She states that since she has back pain she sits a lot and noticed she had some irritation to buttocks and applying boudreauxs butt paste.    HPI  Review of Systems   Constitutional: Negative.    Respiratory: Negative.    Cardiovascular: Negative.    Skin: Positive for color change and wound. Negative for pallor and rash.       Objective:      Physical Exam   Constitutional: She appears well-developed and well-nourished. No distress.   Neck: Normal range of motion. Neck supple. No JVD present. No tracheal deviation present. No thyromegaly present.   Cardiovascular: Normal rate, regular rhythm and normal heart sounds.   No murmur heard.  Pulmonary/Chest: Effort normal and breath sounds normal. She has no wheezes.   Lymphadenopathy:     She has no cervical adenopathy.   Skin: Skin is warm and dry. Capillary refill takes less than 2 seconds. No rash noted. She is not diaphoretic. There is erythema. No pallor.        Skin breakdown to buttocks with some excoriation        Assessment:       1. Skin breakdown        Plan:   Alysia ROBERTS was seen today for sores.    Diagnoses and all orders for this visit:    Skin breakdown  -     collagenase (SANTYL) ointment; Apply topically once daily.    "This note will not be shared with the patient."  Avoid boudreauxs butt paste for now   Use donut to sit on to relieve pressure from area and states that she does have one but never used yet  Avoid sitting for prolonged periods of time   RTC after Isabelle   "

## 2018-12-31 ENCOUNTER — OFFICE VISIT (OUTPATIENT)
Dept: INTERNAL MEDICINE | Facility: CLINIC | Age: 83
End: 2018-12-31
Payer: MEDICARE

## 2018-12-31 VITALS
DIASTOLIC BLOOD PRESSURE: 74 MMHG | BODY MASS INDEX: 35.79 KG/M2 | RESPIRATION RATE: 18 BRPM | HEART RATE: 60 BPM | HEIGHT: 63 IN | OXYGEN SATURATION: 96 % | WEIGHT: 202 LBS | SYSTOLIC BLOOD PRESSURE: 114 MMHG

## 2018-12-31 DIAGNOSIS — R23.8 SKIN BREAKDOWN: Primary | ICD-10-CM

## 2018-12-31 PROCEDURE — 99999 PR PBB SHADOW E&M-EST. PATIENT-LVL IV: CPT | Mod: PBBFAC,,, | Performed by: NURSE PRACTITIONER

## 2018-12-31 PROCEDURE — 99213 OFFICE O/P EST LOW 20 MIN: CPT | Mod: S$PBB,,, | Performed by: NURSE PRACTITIONER

## 2018-12-31 PROCEDURE — 99214 OFFICE O/P EST MOD 30 MIN: CPT | Mod: PBBFAC,PN | Performed by: NURSE PRACTITIONER

## 2018-12-31 NOTE — PROGRESS NOTES
"Subjective:       Patient ID: Alysia Ross is a 87 y.o. female.    Chief Complaint: Follow-up (x 2 wks-)    Patient is known, to me and presents with   Chief Complaint   Patient presents with    Follow-up     x 2 wks-   .  Denies chest pain and shortness of breath.  Patient presents with follow up breakdown to sacral area. Healing well with no further breakdown   HPI  Review of Systems   Constitutional: Negative.    Respiratory: Negative.    Cardiovascular: Negative.    Skin: Positive for wound.       Objective:      Physical Exam   Constitutional: She appears well-developed and well-nourished. No distress.   Neck: Normal range of motion. Neck supple. No JVD present. No tracheal deviation present. No thyromegaly present.   Cardiovascular: Normal rate, regular rhythm and normal heart sounds.   No murmur heard.  Pulmonary/Chest: Effort normal and breath sounds normal. She has no wheezes.   Lymphadenopathy:     She has no cervical adenopathy.   Skin: Skin is warm and dry. Capillary refill takes less than 2 seconds. No rash noted. She is not diaphoretic. No erythema. No pallor.        Improvement in breakdown. Smaller in size and no eschar or further breakdown       Assessment:       1. Skin breakdown        Plan:   Alysia ROBERTS was seen today for follow-up.    Diagnoses and all orders for this visit:    Skin breakdown    "This note will not be shared with the patient."  Continue with [facility]  rtc in two weeks  "

## 2019-01-14 ENCOUNTER — OFFICE VISIT (OUTPATIENT)
Dept: INTERNAL MEDICINE | Facility: CLINIC | Age: 84
End: 2019-01-14
Payer: MEDICARE

## 2019-01-14 VITALS
SYSTOLIC BLOOD PRESSURE: 122 MMHG | BODY MASS INDEX: 36.32 KG/M2 | OXYGEN SATURATION: 95 % | DIASTOLIC BLOOD PRESSURE: 72 MMHG | WEIGHT: 205 LBS | HEIGHT: 63 IN | HEART RATE: 60 BPM | RESPIRATION RATE: 18 BRPM

## 2019-01-14 DIAGNOSIS — E66.01 SEVERE OBESITY (BMI 35.0-35.9 WITH COMORBIDITY): ICD-10-CM

## 2019-01-14 DIAGNOSIS — R23.8 SKIN BREAKDOWN: Primary | ICD-10-CM

## 2019-01-14 DIAGNOSIS — N18.30 CKD (CHRONIC KIDNEY DISEASE), STAGE III: ICD-10-CM

## 2019-01-14 PROCEDURE — 99214 OFFICE O/P EST MOD 30 MIN: CPT | Mod: S$PBB,,, | Performed by: NURSE PRACTITIONER

## 2019-01-14 PROCEDURE — 99214 OFFICE O/P EST MOD 30 MIN: CPT | Mod: PBBFAC,PN | Performed by: NURSE PRACTITIONER

## 2019-01-14 PROCEDURE — 99999 PR PBB SHADOW E&M-EST. PATIENT-LVL IV: CPT | Mod: PBBFAC,,, | Performed by: NURSE PRACTITIONER

## 2019-01-14 PROCEDURE — 99999 PR PBB SHADOW E&M-EST. PATIENT-LVL IV: ICD-10-PCS | Mod: PBBFAC,,, | Performed by: NURSE PRACTITIONER

## 2019-01-14 PROCEDURE — 99214 PR OFFICE/OUTPT VISIT, EST, LEVL IV, 30-39 MIN: ICD-10-PCS | Mod: S$PBB,,, | Performed by: NURSE PRACTITIONER

## 2019-01-14 NOTE — PROGRESS NOTES
"Subjective:       Patient ID: Alysia Ross is a 87 y.o. female.    Chief Complaint: Follow-up (x 2 wk F/U)    Patient is known, to me and presents with   Chief Complaint   Patient presents with    Follow-up     x 2 wk F/U   .  Denies chest pain and shortness of breath.  Follow up breakdown to sacral area and is healed well.   HPI  Review of Systems   Constitutional: Negative.    Respiratory: Negative.    Cardiovascular: Negative.    Skin: Positive for wound.       Objective:      Physical Exam   Constitutional: She appears well-developed and well-nourished. No distress.   Neck: Normal range of motion. Neck supple. No JVD present. No tracheal deviation present. No thyromegaly present.   Cardiovascular: Normal rate, regular rhythm and normal heart sounds.   No murmur heard.  Pulmonary/Chest: Effort normal and breath sounds normal. She has no wheezes.   Lymphadenopathy:     She has no cervical adenopathy.   Skin: Skin is warm and dry. Capillary refill takes less than 2 seconds. No rash noted. She is not diaphoretic. No erythema. No pallor.   Wound to sacral area healed         Assessment:       1. Skin breakdown    2. CKD (chronic kidney disease), stage III    3. Severe obesity (BMI 35.0-35.9 with comorbidity)        Plan:   Alysia ROBERTS was seen today for follow-up.    Diagnoses and all orders for this visit:    Skin breakdown    CKD (chronic kidney disease), stage III    Severe obesity (BMI 35.0-35.9 with comorbidity)    "This note will not be shared with the patient."  Apply barrier cream and no longer needs santyl  rtc as scheduled  "

## 2019-01-31 DIAGNOSIS — I10 ESSENTIAL HYPERTENSION: ICD-10-CM

## 2019-01-31 RX ORDER — LISINOPRIL 40 MG/1
TABLET ORAL
Qty: 90 TABLET | Refills: 1 | Status: SHIPPED | OUTPATIENT
Start: 2019-01-31 | End: 2019-08-15 | Stop reason: SDUPTHER

## 2019-01-31 RX ORDER — METOPROLOL SUCCINATE 25 MG/1
TABLET, EXTENDED RELEASE ORAL
Qty: 90 TABLET | Refills: 1 | Status: SHIPPED | OUTPATIENT
Start: 2019-01-31 | End: 2019-07-31 | Stop reason: SDUPTHER

## 2019-02-28 DIAGNOSIS — I10 ESSENTIAL HYPERTENSION: ICD-10-CM

## 2019-02-28 DIAGNOSIS — F32.A DEPRESSION, UNSPECIFIED DEPRESSION TYPE: ICD-10-CM

## 2019-02-28 DIAGNOSIS — E78.5 HYPERLIPIDEMIA, UNSPECIFIED HYPERLIPIDEMIA TYPE: ICD-10-CM

## 2019-02-28 DIAGNOSIS — M19.90 OSTEOARTHRITIS, UNSPECIFIED OSTEOARTHRITIS TYPE, UNSPECIFIED SITE: ICD-10-CM

## 2019-02-28 RX ORDER — TRAMADOL HYDROCHLORIDE 50 MG/1
TABLET ORAL
Qty: 120 TABLET | Refills: 2 | Status: SHIPPED | OUTPATIENT
Start: 2019-02-28 | End: 2019-09-25 | Stop reason: SDUPTHER

## 2019-02-28 RX ORDER — SIMVASTATIN 40 MG/1
TABLET, FILM COATED ORAL
Qty: 90 TABLET | Refills: 1 | Status: SHIPPED | OUTPATIENT
Start: 2019-02-28 | End: 2019-08-28 | Stop reason: SDUPTHER

## 2019-02-28 RX ORDER — AMLODIPINE BESYLATE 2.5 MG/1
TABLET ORAL
Qty: 30 TABLET | Refills: 5 | Status: SHIPPED | OUTPATIENT
Start: 2019-02-28 | End: 2019-07-29 | Stop reason: SDUPTHER

## 2019-02-28 RX ORDER — CLONAZEPAM 0.5 MG/1
TABLET ORAL
Qty: 45 TABLET | Refills: 1 | Status: SHIPPED | OUTPATIENT
Start: 2019-02-28 | End: 2019-08-28 | Stop reason: SDUPTHER

## 2019-05-01 DIAGNOSIS — R25.1 TREMOR: ICD-10-CM

## 2019-05-01 RX ORDER — PRIMIDONE 50 MG/1
TABLET ORAL
Qty: 270 TABLET | Refills: 1 | Status: SHIPPED | OUTPATIENT
Start: 2019-05-01 | End: 2019-10-29 | Stop reason: SDUPTHER

## 2019-05-29 DIAGNOSIS — M81.0 OSTEOPOROSIS, UNSPECIFIED OSTEOPOROSIS TYPE, UNSPECIFIED PATHOLOGICAL FRACTURE PRESENCE: ICD-10-CM

## 2019-05-29 DIAGNOSIS — F32.A DEPRESSION, UNSPECIFIED DEPRESSION TYPE: ICD-10-CM

## 2019-05-29 DIAGNOSIS — R60.9 DEPENDENT EDEMA: ICD-10-CM

## 2019-05-29 DIAGNOSIS — I10 ESSENTIAL HYPERTENSION: ICD-10-CM

## 2019-05-29 RX ORDER — CITALOPRAM 10 MG/1
TABLET ORAL
Qty: 90 TABLET | Refills: 1 | Status: SHIPPED | OUTPATIENT
Start: 2019-05-29 | End: 2019-11-25 | Stop reason: SDUPTHER

## 2019-05-29 RX ORDER — SPIRONOLACTONE AND HYDROCHLOROTHIAZIDE 25; 25 MG/1; MG/1
1 TABLET ORAL DAILY
Qty: 90 TABLET | Refills: 1 | Status: SHIPPED | OUTPATIENT
Start: 2019-05-29 | End: 2019-11-25 | Stop reason: SDUPTHER

## 2019-05-29 RX ORDER — ALENDRONATE SODIUM 70 MG/1
70 TABLET ORAL
Qty: 12 TABLET | Refills: 1 | Status: SHIPPED | OUTPATIENT
Start: 2019-05-29 | End: 2019-06-03 | Stop reason: SDUPTHER

## 2019-05-29 RX ORDER — POTASSIUM CHLORIDE 20 MEQ/1
TABLET, EXTENDED RELEASE ORAL
Qty: 90 TABLET | Refills: 1 | Status: SHIPPED | OUTPATIENT
Start: 2019-05-29 | End: 2019-06-17 | Stop reason: SDUPTHER

## 2019-05-29 NOTE — TELEPHONE ENCOUNTER
----- Message from Mercedes Corado sent at 2019 10:44 AM CDT -----  Contact: [facility]   Alysia Ross  MRN: [id number]  : 3/1/1931  PCP: Carl Aguayo  Home Phone      [phone]  Work Phone      Not on file.  Mobile          Not on file.    MESSAGE:   Pharmacy is requesting a refill or new prescription by fax..  Is this a refill or new RX:  refill  RX name and strength: alendronate (FOSAMAX) 70 MG tablet  Last office visit: 2018  Last fill date: 2019  Is this a 30-day or 90-day RX:  Qty 12  Pharmacy name and location: [facility]    Sig: Take 1 tablet by mouth once a week in the morning with a full glass of water, 30 minutes ebfore the first meal, beverage or medication of the day. Remain upright.

## 2019-06-03 DIAGNOSIS — M81.0 OSTEOPOROSIS, UNSPECIFIED OSTEOPOROSIS TYPE, UNSPECIFIED PATHOLOGICAL FRACTURE PRESENCE: ICD-10-CM

## 2019-06-03 RX ORDER — ALENDRONATE SODIUM 70 MG/1
70 TABLET ORAL
Qty: 12 TABLET | Refills: 1 | Status: SHIPPED | OUTPATIENT
Start: 2019-06-03 | End: 2020-04-29

## 2019-06-03 NOTE — TELEPHONE ENCOUNTER
----- Message from Mercedes Corado sent at 6/3/2019 10:02 AM CDT -----  Contact: [facility]  Alysia Ross  MRN: [id number]  : 3/1/1931  PCP: Carl Aguayo  Home Phone      [phone]  Work Phone      Not on file.  Mobile          Not on file.    MESSAGE:   Pharmacy is requesting a refill or new prescription by fax..  Is this a refill or new RX:  refill  RX name and strength: alendronate (FOSAMAX) 70 MG tablet  Last office visit: 2018  Last fill date: 2019  Is this a 30-day or 90-day RX:  Qty 12  Pharmacy name and location:  [facility]

## 2019-06-06 ENCOUNTER — TELEPHONE (OUTPATIENT)
Dept: FAMILY MEDICINE | Facility: CLINIC | Age: 84
End: 2019-06-06

## 2019-06-17 ENCOUNTER — OFFICE VISIT (OUTPATIENT)
Dept: FAMILY MEDICINE | Facility: CLINIC | Age: 84
End: 2019-06-17
Payer: MEDICARE

## 2019-06-17 VITALS
DIASTOLIC BLOOD PRESSURE: 70 MMHG | RESPIRATION RATE: 18 BRPM | BODY MASS INDEX: 36.46 KG/M2 | HEIGHT: 63 IN | WEIGHT: 205.81 LBS | SYSTOLIC BLOOD PRESSURE: 122 MMHG | HEART RATE: 56 BPM

## 2019-06-17 DIAGNOSIS — E55.9 VITAMIN D DEFICIENCY DISEASE: ICD-10-CM

## 2019-06-17 DIAGNOSIS — E66.01 SEVERE OBESITY (BMI 35.0-35.9 WITH COMORBIDITY): ICD-10-CM

## 2019-06-17 DIAGNOSIS — M81.0 OSTEOPOROSIS, UNSPECIFIED OSTEOPOROSIS TYPE, UNSPECIFIED PATHOLOGICAL FRACTURE PRESENCE: ICD-10-CM

## 2019-06-17 DIAGNOSIS — I10 ESSENTIAL HYPERTENSION: ICD-10-CM

## 2019-06-17 DIAGNOSIS — N18.30 CKD (CHRONIC KIDNEY DISEASE), STAGE III: ICD-10-CM

## 2019-06-17 DIAGNOSIS — B02.29 POST HERPETIC NEURALGIA: Primary | ICD-10-CM

## 2019-06-17 DIAGNOSIS — E78.2 MIXED HYPERLIPIDEMIA: ICD-10-CM

## 2019-06-17 DIAGNOSIS — Z86.73 HISTORY OF CVA (CEREBROVASCULAR ACCIDENT): ICD-10-CM

## 2019-06-17 DIAGNOSIS — M89.9 BONE DISORDER: ICD-10-CM

## 2019-06-17 DIAGNOSIS — Z23 NEED FOR 23-POLYVALENT PNEUMOCOCCAL POLYSACCHARIDE VACCINE: ICD-10-CM

## 2019-06-17 DIAGNOSIS — Z23 NEED FOR VACCINATION FOR PNEUMOCOCCUS: ICD-10-CM

## 2019-06-17 PROCEDURE — 99499 RISK ADDL DX/OHS AUDIT: ICD-10-PCS | Mod: S$PBB,,, | Performed by: FAMILY MEDICINE

## 2019-06-17 PROCEDURE — 90732 PPSV23 VACC 2 YRS+ SUBQ/IM: CPT | Mod: PBBFAC

## 2019-06-17 PROCEDURE — 99499 UNLISTED E&M SERVICE: CPT | Mod: S$PBB,,, | Performed by: FAMILY MEDICINE

## 2019-06-17 PROCEDURE — 99999 PR STA SHADOW: ICD-10-PCS | Mod: PBBFAC,,, | Performed by: FAMILY MEDICINE

## 2019-06-17 PROCEDURE — 99999 PR PBB SHADOW E&M-EST. PATIENT-LVL III: CPT | Mod: PBBFAC,,, | Performed by: FAMILY MEDICINE

## 2019-06-17 PROCEDURE — 99213 OFFICE O/P EST LOW 20 MIN: CPT | Mod: PBBFAC | Performed by: FAMILY MEDICINE

## 2019-06-17 PROCEDURE — 99999 PR STA SHADOW: CPT | Mod: PBBFAC,,, | Performed by: FAMILY MEDICINE

## 2019-06-17 PROCEDURE — 99214 OFFICE O/P EST MOD 30 MIN: CPT | Mod: S$PBB | Performed by: FAMILY MEDICINE

## 2019-06-17 PROCEDURE — G0009 ADMIN PNEUMOCOCCAL VACCINE: HCPCS | Mod: PBBFAC

## 2019-06-17 NOTE — PROGRESS NOTES
Subjective:       Patient ID: Alysia Ross is a 88 y.o. female.    Chief Complaint: Follow-up ( 6month )    Pt is a 88 y.o. female who presents for evaluation and management of   Encounter Diagnoses   Name Primary?    Post herpetic neuralgia Yes    Essential hypertension     Mixed hyperlipidemia     History of CVA (cerebrovascular accident)     CKD (chronic kidney disease), stage III     Osteoporosis, unspecified osteoporosis type, unspecified pathological fracture presence     Severe obesity (BMI 35.0-35.9 with comorbidity)     Bone disorder     Vitamin D deficiency disease    .  Doing well on current meds. Denies any side effects. Prevention is up to date.  Review of Systems   Constitutional: Negative for chills and fever.   HENT: Positive for ear pain.         Tingling and itching of left ear since shingles in Nov 2018      Respiratory: Negative for shortness of breath.    Cardiovascular: Negative for chest pain and palpitations.   Gastrointestinal: Negative for abdominal pain, blood in stool, constipation and nausea.   Genitourinary: Negative for difficulty urinating.   Psychiatric/Behavioral: Negative for dysphoric mood, sleep disturbance and suicidal ideas. The patient is not nervous/anxious.        Objective:      Physical Exam   Constitutional: She is oriented to person, place, and time. She appears well-developed and well-nourished.   HENT:   Head: Normocephalic and atraumatic.   Right Ear: External ear normal.   Left Ear: External ear normal.   Nose: Nose normal.   Mouth/Throat: Oropharynx is clear and moist.   Eyes: Pupils are equal, round, and reactive to light. EOM are normal.   Neck: Normal range of motion. Neck supple. No JVD present. No tracheal deviation present. No thyromegaly present.   Cardiovascular: Normal rate, normal heart sounds and intact distal pulses.   No murmur heard.  Pulmonary/Chest: Effort normal and breath sounds normal. No respiratory distress. She has no wheezes.  She has no rales. She exhibits no tenderness.   Abdominal: Soft. Bowel sounds are normal. She exhibits no distension and no mass. There is no tenderness. There is no rebound and no guarding.   Musculoskeletal: Normal range of motion. She exhibits no edema or tenderness.   Lymphadenopathy:     She has no cervical adenopathy.   Neurological: She is alert and oriented to person, place, and time. She has normal reflexes. She displays normal reflexes. No cranial nerve deficit. She exhibits normal muscle tone. Coordination normal.   Skin: Skin is warm and dry. No rash noted. No erythema. No pallor.   Psychiatric: She has a normal mood and affect. Her behavior is normal. Judgment and thought content normal.       Assessment:       1. Post herpetic neuralgia    2. Essential hypertension    3. Mixed hyperlipidemia    4. History of CVA (cerebrovascular accident)    5. CKD (chronic kidney disease), stage III    6. Osteoporosis, unspecified osteoporosis type, unspecified pathological fracture presence    7. Severe obesity (BMI 35.0-35.9 with comorbidity)    8. Bone disorder        Plan:   Alysia ROBERTS was seen today for follow-up.    Diagnoses and all orders for this visit:    Post herpetic neuralgia    Essential hypertension  -     CBC auto differential; Future  -     Comprehensive metabolic panel; Future  -     Lipid panel; Future  -     TSH; Future    Mixed hyperlipidemia    History of CVA (cerebrovascular accident)    CKD (chronic kidney disease), stage III    Osteoporosis, unspecified osteoporosis type, unspecified pathological fracture presence    Severe obesity (BMI 35.0-35.9 with comorbidity)    Bone disorder  -     DXA Bone Density Spine And Hip; Future    Vitamin D deficiency disease  -     Vitamin D; Future      Problem List Items Addressed This Visit     CKD (chronic kidney disease), stage III    History of CVA (cerebrovascular accident)    Hyperlipidemia    Hypertension    Osteoporosis    Severe obesity (BMI 35.0-35.9  with comorbidity)      Other Visit Diagnoses     Post herpetic neuralgia    -  Primary    Bone disorder        Relevant Orders    DXA Bone Density Spine And Hip        No follow-ups on file.

## 2019-06-20 ENCOUNTER — OFFICE VISIT (OUTPATIENT)
Dept: NEUROLOGY | Facility: CLINIC | Age: 84
End: 2019-06-20
Payer: MEDICARE

## 2019-06-20 ENCOUNTER — HOSPITAL ENCOUNTER (OUTPATIENT)
Dept: RADIOLOGY | Facility: HOSPITAL | Age: 84
Discharge: HOME | End: 2019-06-20
Attending: FAMILY MEDICINE
Payer: MEDICARE

## 2019-06-20 VITALS
BODY MASS INDEX: 36.06 KG/M2 | WEIGHT: 203.5 LBS | DIASTOLIC BLOOD PRESSURE: 62 MMHG | HEART RATE: 57 BPM | HEIGHT: 63 IN | SYSTOLIC BLOOD PRESSURE: 110 MMHG | RESPIRATION RATE: 16 BRPM

## 2019-06-20 DIAGNOSIS — I10 ESSENTIAL HYPERTENSION: ICD-10-CM

## 2019-06-20 DIAGNOSIS — M48.061 SPINAL STENOSIS OF LUMBAR REGION WITHOUT NEUROGENIC CLAUDICATION: ICD-10-CM

## 2019-06-20 DIAGNOSIS — I69.90 LATE EFFECTS OF CVA (CEREBROVASCULAR ACCIDENT): ICD-10-CM

## 2019-06-20 DIAGNOSIS — M89.9 BONE DISORDER: ICD-10-CM

## 2019-06-20 DIAGNOSIS — R25.1 TREMOR: Primary | ICD-10-CM

## 2019-06-20 DIAGNOSIS — E78.5 DYSLIPIDEMIA: ICD-10-CM

## 2019-06-20 PROCEDURE — 77080 DXA BONE DENSITY AXIAL: CPT | Mod: TC

## 2019-06-20 PROCEDURE — 99213 OFFICE O/P EST LOW 20 MIN: CPT | Mod: PBBFAC,25 | Performed by: PSYCHIATRY & NEUROLOGY

## 2019-06-20 PROCEDURE — 77080 DXA BONE DENSITY AXIAL: CPT | Mod: 26,,, | Performed by: RADIOLOGY

## 2019-06-20 PROCEDURE — 99999 PR STA SHADOW: ICD-10-PCS | Mod: PBBFAC,,, | Performed by: PSYCHIATRY & NEUROLOGY

## 2019-06-20 PROCEDURE — 99214 OFFICE O/P EST MOD 30 MIN: CPT | Mod: S$PBB | Performed by: PSYCHIATRY & NEUROLOGY

## 2019-06-20 PROCEDURE — 99499 UNLISTED E&M SERVICE: CPT | Mod: S$PBB,,, | Performed by: PSYCHIATRY & NEUROLOGY

## 2019-06-20 PROCEDURE — 99499 RISK ADDL DX/OHS AUDIT: ICD-10-PCS | Mod: S$PBB,,, | Performed by: PSYCHIATRY & NEUROLOGY

## 2019-06-20 PROCEDURE — 99999 PR STA SHADOW: CPT | Mod: PBBFAC,,, | Performed by: PSYCHIATRY & NEUROLOGY

## 2019-06-20 PROCEDURE — 77080 DEXA BONE DENSITY SPINE HIP: ICD-10-PCS | Mod: 26,,, | Performed by: RADIOLOGY

## 2019-06-20 PROCEDURE — 99999 PR PBB SHADOW E&M-EST. PATIENT-LVL III: CPT | Mod: PBBFAC,,, | Performed by: PSYCHIATRY & NEUROLOGY

## 2019-06-20 NOTE — PROGRESS NOTES
HPI :  Alysia Ross is a 86 y.o. female with HTN, hyperlipidemia and remote CVA impacting speech now complaining of speech changes (word finding difficulty) (with no new changes by MRI-old lacunar change only)  and tremor with activities/ posture only. Looks more like essential tremor (jaw tremor noted  with no additional Parkinson's features)  A convulsive syncope provoked by maintained upright position during syncope in 4/2016.    Her tremor is well controlled and stable.  No further syncope.  Speech is the same. Her back pain is the same.     She is here with her daughter today.  She is functioning better with walker than cane at this time    Patient was treated for shingles of the ear region by ENT. She is improved. Not having much pain but states she has residual hearing loss from this.      Review of Systems   Constitutional: Negative for fever.   HENT: Negative for nosebleeds.    Eyes: Negative for double vision.   Respiratory: Negative for hemoptysis.    Cardiovascular: Negative for leg swelling.   Gastrointestinal: Negative for blood in stool.   Genitourinary: Negative for hematuria.   Musculoskeletal: Positive for back pain. Negative for falls.   Skin: Negative for rash.   Neurological: Positive for tremors.   Psychiatric/Behavioral: Negative for memory loss.         Gen Appearance, well developed/nourished in no apparent distress  CV: 2+ distal pulses with no edema or swelling  Neuro:  MS: Awake, alert, . Sustains attention. Recent/remote memory intact, Language is full to spontaneous speech/comprehension. Fund of Knowledge is full, and speech is normal today  CN:  PERRL, Extraoccular movements and visual fields are full. Normal facial sensation and strength, Hearing symmetric, Tongue and Palate are midline and strong. Shoulder Shrug symmetric and strong.  Motor: Normal bulk, tone, no abnormal movements at rest and no bradykinesia except for an occassional tongue smacking noted prior is not noted  today-- no jaw tremor. Tremor with outstretched hands and action only, very mild 5/5 strength bilateral upper/lower extremities with 2+ reflexes   Sensory: symmetric to temp, and vibration.  Romberg negative  Cerebellar: Finger-nose Rapid alternating movements intact  Gait: Normal stance, no ataxia. Posture is good, mild difficulty with rising from chair, no shuffling, no en bloc turning and no reduced arm swing (she does have reduction in walking speed today with arthralgic gait noted) Postural reflexes mildly impaired. She is using walker now    Imagin/3/2016 CT head: No acute intracranial process.   Moderate chronic ischemic microvascular change.    EEG: Clinical Impression:  Normal awake and  Drowsy EEG.      Echo showed normal EF    Holter showed: 12 beat run of nonsustained atrial tachycardia.    EKG showed NSR    Labs:110 LDL 2017 labs  2018: , CMP reviewed    2017 MRI Brain: 1.  No MRI evidence of an acute intracranial abnormality.  2. Advanced atrophy and small vessel ischemic changes of the periventricular white matter.      2017 MRI Lumbar spine: 1.  Advanced facet degenerative changes with minimal anterolisthesis of L4 on L5 causing moderate acquired spinal stenosis.  Moderate right-sided foraminal encroachment.  2.  Degenerative changes at L2 and L3 with mild to moderate acquired spinal canal narrowing and foraminal encroachment.  3.  Degenerative disc bulge greater to the left at L1-L2.    Assessment/Plan: lAysia Ross is a 86 y.o. female with HTN, hyperlipidemia and remote CVA impacting speech now complaining of speech changes (word finding difficulty) (with no new changes by MRI-old lacunar change only)  and tremor with activities/ posture only. Looks more like essential tremor (jaw tremor noted  with no additional Parkinson's features)  A convulsive syncope provoked by maintained upright position during syncope in 2016.     I recommend:     1. EEG, neuro-exam,  and CT were  reassuring for spells in 2016- Syncope spell likely vasovagally mediated and resolved  2. Metoprolol (given by another provider for atrial tach) is also helping tremor.   Also continue Primidone 50mg TID for tremor which is well controlled. Pending bone density scan with PCP  3. For Lumbar pain: offered pain management consult, but she declined prior Neuropathic cream helped somewhat (note moderate spinal stenosis by Lumbar 1/2017). PT helped prior- monitor complaint  4. Speech changes are responding to Primidone/ may be tremor related. Vs Late effect CVA (dystonia) / repeat imaging benign prior  5. Clonazepam may be helping jaw tremor- now prescribed by PCP  6. Remote History of CVA noted-- For stroke prevention: Continue treatments for  HTN, Dyslipidemia (LDL goal less than 100-reviewed)and continue ASA daily  7. Agree with her using a cane for fall prevention. Instructions on use given piior  RTC 6 months

## 2019-07-29 DIAGNOSIS — I10 ESSENTIAL HYPERTENSION: ICD-10-CM

## 2019-07-29 RX ORDER — AMLODIPINE BESYLATE 2.5 MG/1
2.5 TABLET ORAL DAILY
Qty: 30 TABLET | Refills: 5 | Status: SHIPPED | OUTPATIENT
Start: 2019-07-29 | End: 2020-02-19

## 2019-07-29 NOTE — TELEPHONE ENCOUNTER
----- Message from Lata Marshall sent at 7/29/2019  9:44 AM CDT -----  Contact: Pharmacy Fax [facility]  Pharmacy requesting prescription for patient.    Is this a refill or new RX:  refill  RX name and strength: amlodipine 2.5 mg  Last office visit: 6/17/19  Last fill date:2/28/19  Is this a 30-day or 90-day RX:  30  Pharmacy name and location:  [facility] in [place]

## 2019-07-31 DIAGNOSIS — I10 ESSENTIAL HYPERTENSION: ICD-10-CM

## 2019-07-31 RX ORDER — METOPROLOL SUCCINATE 25 MG/1
TABLET, EXTENDED RELEASE ORAL
Qty: 90 TABLET | Refills: 1 | Status: SHIPPED | OUTPATIENT
Start: 2019-07-31 | End: 2020-01-25

## 2019-08-15 DIAGNOSIS — I10 ESSENTIAL HYPERTENSION: ICD-10-CM

## 2019-08-15 RX ORDER — LISINOPRIL 40 MG/1
TABLET ORAL
Qty: 90 TABLET | Refills: 1 | Status: SHIPPED | OUTPATIENT
Start: 2019-08-15 | End: 2020-02-20

## 2019-08-28 DIAGNOSIS — E78.5 HYPERLIPIDEMIA, UNSPECIFIED HYPERLIPIDEMIA TYPE: ICD-10-CM

## 2019-08-28 DIAGNOSIS — F32.A DEPRESSION, UNSPECIFIED DEPRESSION TYPE: ICD-10-CM

## 2019-08-28 RX ORDER — CLONAZEPAM 0.5 MG/1
TABLET ORAL
Qty: 45 TABLET | Refills: 1 | Status: SHIPPED | OUTPATIENT
Start: 2019-08-28 | End: 2020-02-20

## 2019-08-28 RX ORDER — SIMVASTATIN 40 MG/1
TABLET, FILM COATED ORAL
Qty: 90 TABLET | Refills: 1 | Status: SHIPPED | OUTPATIENT
Start: 2019-08-28 | End: 2020-03-11

## 2019-09-25 DIAGNOSIS — M19.90 OSTEOARTHRITIS, UNSPECIFIED OSTEOARTHRITIS TYPE, UNSPECIFIED SITE: ICD-10-CM

## 2019-09-25 RX ORDER — TRAMADOL HYDROCHLORIDE 50 MG/1
TABLET ORAL
Qty: 28 TABLET | Refills: 5 | Status: SHIPPED | OUTPATIENT
Start: 2019-09-25 | End: 2020-04-02

## 2019-10-29 DIAGNOSIS — R25.1 TREMOR: ICD-10-CM

## 2019-10-29 RX ORDER — PRIMIDONE 50 MG/1
TABLET ORAL
Qty: 270 TABLET | Refills: 1 | Status: SHIPPED | OUTPATIENT
Start: 2019-10-29 | End: 2020-04-29

## 2019-11-25 DIAGNOSIS — I10 ESSENTIAL HYPERTENSION: ICD-10-CM

## 2019-11-25 DIAGNOSIS — F32.A DEPRESSION, UNSPECIFIED DEPRESSION TYPE: ICD-10-CM

## 2019-11-25 DIAGNOSIS — R60.9 DEPENDENT EDEMA: ICD-10-CM

## 2019-11-25 RX ORDER — SPIRONOLACTONE AND HYDROCHLOROTHIAZIDE 25; 25 MG/1; MG/1
1 TABLET ORAL DAILY
Qty: 90 TABLET | Refills: 1 | Status: SHIPPED | OUTPATIENT
Start: 2019-11-25 | End: 2020-05-19

## 2019-11-25 RX ORDER — POTASSIUM CHLORIDE 20 MEQ/1
TABLET, EXTENDED RELEASE ORAL
Qty: 90 TABLET | Refills: 1 | Status: SHIPPED | OUTPATIENT
Start: 2019-11-25 | End: 2020-05-19

## 2019-11-25 RX ORDER — CITALOPRAM 10 MG/1
TABLET ORAL
Qty: 90 TABLET | Refills: 1 | Status: SHIPPED | OUTPATIENT
Start: 2019-11-25 | End: 2020-05-19

## 2020-01-22 ENCOUNTER — OFFICE VISIT (OUTPATIENT)
Dept: NEUROLOGY | Facility: CLINIC | Age: 85
End: 2020-01-22
Payer: MEDICARE

## 2020-01-22 VITALS
SYSTOLIC BLOOD PRESSURE: 132 MMHG | DIASTOLIC BLOOD PRESSURE: 68 MMHG | WEIGHT: 201.06 LBS | BODY MASS INDEX: 37 KG/M2 | HEIGHT: 62 IN | HEART RATE: 54 BPM | RESPIRATION RATE: 14 BRPM

## 2020-01-22 DIAGNOSIS — I10 ESSENTIAL HYPERTENSION: ICD-10-CM

## 2020-01-22 DIAGNOSIS — E78.2 MIXED HYPERLIPIDEMIA: ICD-10-CM

## 2020-01-22 DIAGNOSIS — M81.0 OSTEOPOROSIS, UNSPECIFIED OSTEOPOROSIS TYPE, UNSPECIFIED PATHOLOGICAL FRACTURE PRESENCE: ICD-10-CM

## 2020-01-22 DIAGNOSIS — I69.90 LATE EFFECTS OF CVA (CEREBROVASCULAR ACCIDENT): ICD-10-CM

## 2020-01-22 DIAGNOSIS — R25.1 TREMOR: Primary | ICD-10-CM

## 2020-01-22 DIAGNOSIS — N18.30 CKD (CHRONIC KIDNEY DISEASE), STAGE III: ICD-10-CM

## 2020-01-22 DIAGNOSIS — F32.A DEPRESSION, UNSPECIFIED DEPRESSION TYPE: ICD-10-CM

## 2020-01-22 PROCEDURE — 99999 PR PBB SHADOW E&M-EST. PATIENT-LVL IV: ICD-10-PCS | Mod: PBBFAC,,, | Performed by: NURSE PRACTITIONER

## 2020-01-22 PROCEDURE — 99999 PR PBB SHADOW E&M-EST. PATIENT-LVL IV: CPT | Mod: PBBFAC,,, | Performed by: NURSE PRACTITIONER

## 2020-01-22 PROCEDURE — 99214 OFFICE O/P EST MOD 30 MIN: CPT | Mod: PBBFAC | Performed by: NURSE PRACTITIONER

## 2020-01-22 PROCEDURE — 99214 OFFICE O/P EST MOD 30 MIN: CPT | Mod: S$PBB | Performed by: NURSE PRACTITIONER

## 2020-01-22 PROCEDURE — 99999 PR STA SHADOW: CPT | Mod: PBBFAC,,, | Performed by: NURSE PRACTITIONER

## 2020-01-22 NOTE — PROGRESS NOTES
HPI : Alysia Ross is a 88 y.o. female with HTN, hyperlipidemia and remote CVA impacting speech now complaining of speech changes (word finding difficulty) (with no new changes by MRI-old lacunar change only)  and tremor with activities/ posture only. Looks more like essential tremor (jaw tremor noted  with no additional Parkinson's features). A convulsive syncope provoked by maintained upright position during syncope in 4/2016. She has HTN, depression, HLD, CKD, and osteoporosis. Shingles to left ear area in 11/2018.     She presents today for a follow up visit.  Her tremor is well controlled and stable.    She reports that her son was diagnosed with Parkinsons since her last visit.     No further syncope.    Speech is the same. Her back pain is chronic, but is tolerable with prn Tramadol per PCP. She declines any referral to pain management, as she declines any injections.   She uses a walker for gait stability.     Continues with hearing loss since she had shingles to the left ear. She has minor, occasional itching complaints to her prior shingles site, but this is tolerable, and much improved.     Review of Systems   Constitutional: Negative for fever.   HENT: Negative for nosebleeds.    Eyes: Negative for double vision.   Respiratory: Negative for hemoptysis.    Cardiovascular: Negative for leg swelling.   Gastrointestinal: Negative for blood in stool.   Genitourinary: Negative for hematuria.   Musculoskeletal: Positive for back pain. Negative for falls.   Skin: Negative for rash.   Neurological: Positive for tremors.   Psychiatric/Behavioral: Negative for memory loss.     Gen Appearance, well developed/nourished in no apparent distress  CV: 2+ distal pulses with no edema or swelling  Neuro:  MS: Awake, alert, . Sustains attention. Recent/remote memory intact, Language is full to spontaneous speech/comprehension. Fund of Knowledge is full, and speech is normal today  CN:  PERRL, Extraoccular movements  and visual fields are full. Normal facial sensation and strength, Hearing symmetric, Tongue and Palate are midline and strong. Shoulder Shrug symmetric and strong.  Motor: Normal bulk, tone, no abnormal movements at rest and no bradykinesia except for an occassional tongue smacking noted prior is not noted today-- no jaw tremor. Mild tremor with outstretched hands and action only, very mild 5/5 strength bilateral upper/lower extremities with 2+ reflexes   Sensory: symmetric to temp, and vibration.  Romberg negative  Cerebellar: Finger-nose Rapid alternating movements intact  Gait: Normal stance, no ataxia. Posture is good, mild difficulty with rising from chair, no shuffling, no en bloc turning and no reduced arm swing (she does have reduction in walking speed today with arthralgic gait noted) Postural reflexes mildly impaired. She is using walker now    Imagin/3/2016 CT head: No acute intracranial process.   Moderate chronic ischemic microvascular change.    EEG: Clinical Impression:  Normal awake and  Drowsy EEG.      Echo showed normal EF    Holter showed: 12 beat run of nonsustained atrial tachycardia.    EKG showed NSR    Labs:  110 LDL 2017 labs  2018: , CMP reviewed  2019     2017 MRI Brain:   1.  No MRI evidence of an acute intracranial abnormality.  2. Advanced atrophy and small vessel ischemic changes of the periventricular white matter.      2017 MRI Lumbar spine:   1.  Advanced facet degenerative changes with minimal anterolisthesis of L4 on L5 causing moderate acquired spinal stenosis.  Moderate right-sided foraminal encroachment.  2.  Degenerative changes at L2 and L3 with mild to moderate acquired spinal canal narrowing and foraminal encroachment.  3.  Degenerative disc bulge greater to the left at L1-L2.    Assessment/Plan: Alysia Ross is a 86 y.o. female with HTN, hyperlipidemia and remote CVA impacting speech now complaining of speech changes (word finding  difficulty) (with no new changes by MRI-old lacunar change only)  and tremor with activities/ posture only. Looks more like essential tremor (jaw tremor noted  with no additional Parkinson's features) A convulsive syncope provoked by maintained upright position during syncope in 4/2016.     I recommend:   1. EEG, neuro-exam,  and CT were reassuring for spells in 2016- Syncope spell likely vasovagally mediated and resolved  2. Metoprolol (given by another provider for atrial tach) is also helping tremor.   Also continue Primidone 50mg TID for tremor which is well controlled. On treatment for osteoporosis with PCP.   3. For Lumbar pain: offered pain management consult, but she declined prior Neuropathic cream helped somewhat (note moderate spinal stenosis by Lumbar 1/2017). PT helped prior- monitor complaint  4. Speech changes are responding to Primidone/ may be tremor related. Vs Late effect CVA (dystonia) / repeat imaging benign prior.  5. Clonazepam may be helping jaw tremor- now prescribed by PCP.  6. Remote History of CVA noted-- For stroke prevention: Continue treatments for  HTN, Dyslipidemia (LDL goal less than 100-reviewed)and continue ASA daily  7. Agree with her using a cane for fall prevention. Instructions on use given prior.   8. Advised to discuss hearing loss further with her ENT.     RTC 6 months

## 2020-01-24 DIAGNOSIS — I10 ESSENTIAL HYPERTENSION: ICD-10-CM

## 2020-01-25 RX ORDER — METOPROLOL SUCCINATE 25 MG/1
TABLET, EXTENDED RELEASE ORAL
Qty: 90 TABLET | Refills: 0 | Status: SHIPPED | OUTPATIENT
Start: 2020-01-25 | End: 2020-04-29

## 2020-01-30 ENCOUNTER — OFFICE VISIT (OUTPATIENT)
Dept: FAMILY MEDICINE | Facility: CLINIC | Age: 85
End: 2020-01-30
Payer: MEDICARE

## 2020-01-30 VITALS
DIASTOLIC BLOOD PRESSURE: 74 MMHG | BODY MASS INDEX: 35.5 KG/M2 | RESPIRATION RATE: 18 BRPM | HEART RATE: 64 BPM | SYSTOLIC BLOOD PRESSURE: 118 MMHG | WEIGHT: 200.38 LBS | HEIGHT: 63 IN

## 2020-01-30 DIAGNOSIS — R25.1 TREMOR: ICD-10-CM

## 2020-01-30 DIAGNOSIS — I10 ESSENTIAL HYPERTENSION: ICD-10-CM

## 2020-01-30 DIAGNOSIS — N18.30 CKD (CHRONIC KIDNEY DISEASE), STAGE III: ICD-10-CM

## 2020-01-30 DIAGNOSIS — F32.A DEPRESSION, UNSPECIFIED DEPRESSION TYPE: ICD-10-CM

## 2020-01-30 DIAGNOSIS — M81.0 OSTEOPOROSIS, UNSPECIFIED OSTEOPOROSIS TYPE, UNSPECIFIED PATHOLOGICAL FRACTURE PRESENCE: ICD-10-CM

## 2020-01-30 DIAGNOSIS — E78.2 MIXED HYPERLIPIDEMIA: ICD-10-CM

## 2020-01-30 DIAGNOSIS — E55.9 VITAMIN D DEFICIENCY DISEASE: Primary | ICD-10-CM

## 2020-01-30 LAB
ALBUMIN SERPL BCP-MCNC: 3.5 G/DL (ref 3.5–5.2)
ALP SERPL-CCNC: 71 U/L (ref 55–135)
ALT SERPL W/O P-5'-P-CCNC: 14 U/L (ref 10–44)
ANION GAP SERPL CALC-SCNC: 13 MMOL/L (ref 8–16)
AST SERPL-CCNC: 17 U/L (ref 10–40)
BILIRUB SERPL-MCNC: 0.4 MG/DL (ref 0.1–1)
BUN SERPL-MCNC: 22 MG/DL (ref 8–23)
CALCIUM SERPL-MCNC: 9.8 MG/DL (ref 8.7–10.5)
CHLORIDE SERPL-SCNC: 101 MMOL/L (ref 95–110)
CO2 SERPL-SCNC: 28 MMOL/L (ref 23–29)
CREAT SERPL-MCNC: 1.1 MG/DL (ref 0.5–1.4)
EST. GFR  (AFRICAN AMERICAN): 52 ML/MIN/1.73 M^2
EST. GFR  (NON AFRICAN AMERICAN): 45 ML/MIN/1.73 M^2
GLUCOSE SERPL-MCNC: 99 MG/DL (ref 70–110)
POTASSIUM SERPL-SCNC: 4.4 MMOL/L (ref 3.5–5.1)
PROT SERPL-MCNC: 7.6 G/DL (ref 6–8.4)
SODIUM SERPL-SCNC: 142 MMOL/L (ref 136–145)

## 2020-01-30 PROCEDURE — 80053 COMPREHEN METABOLIC PANEL: CPT

## 2020-01-30 PROCEDURE — 36415 COLL VENOUS BLD VENIPUNCTURE: CPT | Mod: PBBFAC

## 2020-01-30 PROCEDURE — 82306 VITAMIN D 25 HYDROXY: CPT

## 2020-01-30 PROCEDURE — 99999 PR PBB SHADOW E&M-EST. PATIENT-LVL III: CPT | Mod: PBBFAC,,, | Performed by: FAMILY MEDICINE

## 2020-01-30 PROCEDURE — 99999 PR PBB SHADOW E&M-EST. PATIENT-LVL III: ICD-10-PCS | Mod: PBBFAC,,, | Performed by: FAMILY MEDICINE

## 2020-01-30 PROCEDURE — 99214 OFFICE O/P EST MOD 30 MIN: CPT | Mod: S$PBB | Performed by: FAMILY MEDICINE

## 2020-01-30 PROCEDURE — 99999 PR STA SHADOW: CPT | Mod: PBBFAC,,, | Performed by: FAMILY MEDICINE

## 2020-01-30 PROCEDURE — 99213 OFFICE O/P EST LOW 20 MIN: CPT | Mod: PBBFAC | Performed by: FAMILY MEDICINE

## 2020-01-30 NOTE — PROGRESS NOTES
Subjective:       Patient ID: Alysia Ross is a 88 y.o. female.    Chief Complaint: Follow-up (6 month)    Pt is a 88 y.o. female who presents for evaluation and management of   Encounter Diagnoses   Name Primary?    Vitamin D deficiency disease Yes    Mixed hyperlipidemia     Essential hypertension    .  Doing well on current meds. Denies any side effects. Prevention is up to date.    Review of Systems   Constitutional: Negative for chills and fever.   Respiratory: Negative for shortness of breath.    Cardiovascular: Negative for chest pain and palpitations.   Gastrointestinal: Negative for abdominal pain, blood in stool, constipation and nausea.   Genitourinary: Negative for difficulty urinating.   Psychiatric/Behavioral: Negative for dysphoric mood, sleep disturbance and suicidal ideas. The patient is not nervous/anxious.        Objective:      Physical Exam   Constitutional: She is oriented to person, place, and time. She appears well-developed and well-nourished.   HENT:   Head: Normocephalic and atraumatic.   Right Ear: External ear normal.   Left Ear: External ear normal.   Nose: Nose normal.   Mouth/Throat: Oropharynx is clear and moist.   Eyes: Pupils are equal, round, and reactive to light. EOM are normal.   Neck: Normal range of motion. Neck supple. No JVD present. No tracheal deviation present. No thyromegaly present.   Cardiovascular: Normal rate, normal heart sounds and intact distal pulses.   No murmur heard.  Pulmonary/Chest: Effort normal and breath sounds normal. No respiratory distress. She has no wheezes. She has no rales. She exhibits no tenderness.   Abdominal: Soft. Bowel sounds are normal. She exhibits no distension and no mass. There is no tenderness. There is no rebound and no guarding.   Musculoskeletal: Normal range of motion. She exhibits no edema or tenderness.   Lymphadenopathy:     She has no cervical adenopathy.   Neurological: She is alert and oriented to person, place,  and time. She has normal reflexes. She displays normal reflexes. No cranial nerve deficit. She exhibits normal muscle tone. Coordination normal.   Skin: Skin is warm and dry. No rash noted. No erythema. No pallor.   Psychiatric: She has a normal mood and affect. Her behavior is normal. Judgment and thought content normal.       Assessment:       1. Vitamin D deficiency disease    2. Mixed hyperlipidemia    3. Essential hypertension    4. Tremor    5. Depression, unspecified depression type    6. CKD (chronic kidney disease), stage III    7. Osteoporosis, unspecified osteoporosis type, unspecified pathological fracture presence        Plan:   Alysia ROBERTS was seen today for follow-up.    Diagnoses and all orders for this visit:    Vitamin D deficiency disease  -     Vitamin D; Future    Mixed hyperlipidemia  -     Comprehensive metabolic panel; Future    Essential hypertension  -     Comprehensive metabolic panel; Future    Tremor    Depression, unspecified depression type    CKD (chronic kidney disease), stage III  -     Comprehensive metabolic panel; Future    Osteoporosis, unspecified osteoporosis type, unspecified pathological fracture presence      Problem List Items Addressed This Visit     CKD (chronic kidney disease), stage III    Relevant Orders    Comprehensive metabolic panel    Depression    HTN (hypertension)    Relevant Orders    Comprehensive metabolic panel    Hyperlipidemia    Relevant Orders    Comprehensive metabolic panel    Osteoporosis    Tremor      Other Visit Diagnoses     Vitamin D deficiency disease    -  Primary    Relevant Orders    Vitamin D        Continue same see orders   RTC 6 months   Rec shingles vaccine at [facility]     No follow-ups on file.

## 2020-01-31 LAB — 25(OH)D3+25(OH)D2 SERPL-MCNC: 81 NG/ML (ref 30–96)

## 2020-02-17 ENCOUNTER — TELEPHONE (OUTPATIENT)
Dept: FAMILY MEDICINE | Facility: CLINIC | Age: 85
End: 2020-02-17

## 2020-02-17 NOTE — TELEPHONE ENCOUNTER
It wasn't the patient that called Dr. LONGO, it was the pharmacy.         The pharmacist says he isn't sure what happened, but he gave the patient her first dose of shingrix this morning. He states to disregard the call.

## 2020-02-17 NOTE — TELEPHONE ENCOUNTER
----- Message from Eufemia Santos sent at 2020  9:32 AM CST -----  Contact: self  Alysia Ross  MRN: [id number]  : 3/1/1931  PCP: Carl Aguayo  Home Phone      [phone]  Work Phone      Not on file.  Mobile          Not on file.      MESSAGE:   Pt requesting refill or new Rx.   Is this a refill or new RX: refill  RX name and strength: Shingrix 50 mcg  Last office visit: 2020  Is this a 30-day or 90-day RX:  30  Pharmacy name and location:  [facility]  Comments:      Phone:  [phone]

## 2020-02-19 DIAGNOSIS — I10 ESSENTIAL HYPERTENSION: ICD-10-CM

## 2020-02-19 RX ORDER — AMLODIPINE BESYLATE 2.5 MG/1
TABLET ORAL
Qty: 90 TABLET | Refills: 1 | Status: SHIPPED | OUTPATIENT
Start: 2020-02-19 | End: 2020-07-29 | Stop reason: SDUPTHER

## 2020-02-20 DIAGNOSIS — F32.A DEPRESSION, UNSPECIFIED DEPRESSION TYPE: ICD-10-CM

## 2020-02-20 DIAGNOSIS — I10 ESSENTIAL HYPERTENSION: ICD-10-CM

## 2020-02-20 RX ORDER — LISINOPRIL 40 MG/1
TABLET ORAL
Qty: 90 TABLET | Refills: 0 | Status: SHIPPED | OUTPATIENT
Start: 2020-02-20 | End: 2020-07-29 | Stop reason: SDUPTHER

## 2020-02-20 RX ORDER — CLONAZEPAM 0.5 MG/1
TABLET ORAL
Qty: 45 TABLET | Refills: 0 | Status: SHIPPED | OUTPATIENT
Start: 2020-02-20 | End: 2020-05-19

## 2020-03-11 DIAGNOSIS — E78.5 HYPERLIPIDEMIA, UNSPECIFIED HYPERLIPIDEMIA TYPE: ICD-10-CM

## 2020-03-11 RX ORDER — SIMVASTATIN 40 MG/1
TABLET, FILM COATED ORAL
Qty: 90 TABLET | Refills: 0 | Status: SHIPPED | OUTPATIENT
Start: 2020-03-11 | End: 2020-05-19

## 2020-04-02 DIAGNOSIS — M19.90 OSTEOARTHRITIS, UNSPECIFIED OSTEOARTHRITIS TYPE, UNSPECIFIED SITE: ICD-10-CM

## 2020-04-02 RX ORDER — TRAMADOL HYDROCHLORIDE 50 MG/1
TABLET ORAL
Qty: 60 TABLET | Refills: 0 | Status: SHIPPED | OUTPATIENT
Start: 2020-04-02 | End: 2020-06-07

## 2020-04-29 DIAGNOSIS — I10 ESSENTIAL HYPERTENSION: ICD-10-CM

## 2020-04-29 DIAGNOSIS — R25.1 TREMOR: ICD-10-CM

## 2020-04-29 DIAGNOSIS — M81.0 OSTEOPOROSIS, UNSPECIFIED OSTEOPOROSIS TYPE, UNSPECIFIED PATHOLOGICAL FRACTURE PRESENCE: ICD-10-CM

## 2020-04-29 RX ORDER — METOPROLOL SUCCINATE 25 MG/1
TABLET, EXTENDED RELEASE ORAL
Qty: 90 TABLET | Refills: 0 | Status: SHIPPED | OUTPATIENT
Start: 2020-04-29 | End: 2020-07-28

## 2020-04-29 RX ORDER — PRIMIDONE 50 MG/1
TABLET ORAL
Qty: 270 TABLET | Refills: 0 | Status: SHIPPED | OUTPATIENT
Start: 2020-04-29 | End: 2020-07-28

## 2020-04-29 RX ORDER — ALENDRONATE SODIUM 70 MG/1
TABLET ORAL
Qty: 12 TABLET | Refills: 0 | Status: SHIPPED | OUTPATIENT
Start: 2020-04-29 | End: 2020-07-28

## 2020-05-19 DIAGNOSIS — F32.A DEPRESSION, UNSPECIFIED DEPRESSION TYPE: ICD-10-CM

## 2020-05-19 DIAGNOSIS — E78.5 HYPERLIPIDEMIA, UNSPECIFIED HYPERLIPIDEMIA TYPE: ICD-10-CM

## 2020-05-19 DIAGNOSIS — I10 ESSENTIAL HYPERTENSION: ICD-10-CM

## 2020-05-19 DIAGNOSIS — R60.9 DEPENDENT EDEMA: ICD-10-CM

## 2020-05-19 RX ORDER — CLONAZEPAM 0.5 MG/1
TABLET ORAL
Qty: 45 TABLET | Refills: 0 | Status: SHIPPED | OUTPATIENT
Start: 2020-05-19 | End: 2020-08-30

## 2020-05-19 RX ORDER — SIMVASTATIN 40 MG/1
TABLET, FILM COATED ORAL
Qty: 90 TABLET | Refills: 0 | Status: SHIPPED | OUTPATIENT
Start: 2020-05-19 | End: 2020-07-29 | Stop reason: SDUPTHER

## 2020-05-19 RX ORDER — CITALOPRAM 10 MG/1
TABLET ORAL
Qty: 90 TABLET | Refills: 0 | Status: SHIPPED | OUTPATIENT
Start: 2020-05-19 | End: 2020-07-29 | Stop reason: SDUPTHER

## 2020-05-19 RX ORDER — SPIRONOLACTONE AND HYDROCHLOROTHIAZIDE 25; 25 MG/1; MG/1
1 TABLET ORAL DAILY
Qty: 90 TABLET | Refills: 0 | Status: SHIPPED | OUTPATIENT
Start: 2020-05-19 | End: 2020-08-30

## 2020-05-19 RX ORDER — POTASSIUM CHLORIDE 20 MEQ/1
TABLET, EXTENDED RELEASE ORAL
Qty: 90 TABLET | Refills: 5 | Status: SHIPPED | OUTPATIENT
Start: 2020-05-19 | End: 2020-07-29 | Stop reason: SDUPTHER

## 2020-06-08 DIAGNOSIS — M19.90 OSTEOARTHRITIS, UNSPECIFIED OSTEOARTHRITIS TYPE, UNSPECIFIED SITE: ICD-10-CM

## 2020-06-08 RX ORDER — TRAMADOL HYDROCHLORIDE 50 MG/1
50 TABLET ORAL EVERY 6 HOURS PRN
Qty: 60 TABLET | Refills: 2 | Status: SHIPPED | OUTPATIENT
Start: 2020-06-08 | End: 2021-06-22

## 2020-06-08 NOTE — TELEPHONE ENCOUNTER
"RX name and strength: traMADoL (ULTRAM) 50 mg tablet  Last office visit: 01/30/2020  Is this a 30-day or 90-day RX:  30  Pharmacy name and location:  [facility]  Comments:    "please resend stating greater than 7 days please and thank you"- ADDED TO RX      "

## 2020-06-08 NOTE — TELEPHONE ENCOUNTER
"----- Message from Angelina Deleon sent at 2020 11:05 AM CDT -----  Contact: fax  Alysia Ross  MRN: [id number]  : 3/1/1931  PCP: Carl Aguayo  Home Phone      [phone]  Work Phone      Not on file.  Mobile          Not on file.      MESSAGE:   Pt requesting refill or new Rx.   Is this a refill or new RX:  refill  RX name and strength: traMADoL (ULTRAM) 50 mg tablet  Last office visit: 2020  Is this a 30-day or 90-day RX:  30  Pharmacy name and location:  [facility]  Comments:    "please resend stating greater than 7 days please and thank you"  Phone:  [phone]    "

## 2020-07-23 ENCOUNTER — OFFICE VISIT (OUTPATIENT)
Dept: NEUROLOGY | Facility: CLINIC | Age: 85
End: 2020-07-23
Payer: MEDICARE

## 2020-07-23 VITALS
HEART RATE: 72 BPM | RESPIRATION RATE: 14 BRPM | SYSTOLIC BLOOD PRESSURE: 110 MMHG | TEMPERATURE: 97 F | BODY MASS INDEX: 37.81 KG/M2 | DIASTOLIC BLOOD PRESSURE: 58 MMHG | HEIGHT: 62 IN | WEIGHT: 205.5 LBS

## 2020-07-23 DIAGNOSIS — R25.1 TREMOR: Primary | ICD-10-CM

## 2020-07-23 DIAGNOSIS — E78.2 MIXED HYPERLIPIDEMIA: ICD-10-CM

## 2020-07-23 DIAGNOSIS — I10 ESSENTIAL HYPERTENSION: ICD-10-CM

## 2020-07-23 DIAGNOSIS — M48.061 SPINAL STENOSIS OF LUMBAR REGION WITHOUT NEUROGENIC CLAUDICATION: ICD-10-CM

## 2020-07-23 DIAGNOSIS — I69.90 LATE EFFECTS OF CVA (CEREBROVASCULAR ACCIDENT): ICD-10-CM

## 2020-07-23 PROCEDURE — 99999 PR STA SHADOW: ICD-10-PCS | Mod: PBBFAC,,, | Performed by: PSYCHIATRY & NEUROLOGY

## 2020-07-23 PROCEDURE — 99214 OFFICE O/P EST MOD 30 MIN: CPT | Mod: S$PBB | Performed by: PSYCHIATRY & NEUROLOGY

## 2020-07-23 PROCEDURE — 99213 OFFICE O/P EST LOW 20 MIN: CPT | Mod: PBBFAC | Performed by: PSYCHIATRY & NEUROLOGY

## 2020-07-23 PROCEDURE — 99999 PR STA SHADOW: CPT | Mod: PBBFAC,,, | Performed by: PSYCHIATRY & NEUROLOGY

## 2020-07-23 PROCEDURE — 99999 PR PBB SHADOW E&M-EST. PATIENT-LVL III: CPT | Mod: PBBFAC,,, | Performed by: PSYCHIATRY & NEUROLOGY

## 2020-07-23 NOTE — PROGRESS NOTES
HPI :  Alysia Ross is a 89 y.o. female      Here for routine follow up  Her tremor is well controlled still  -no difficulties with eating and drinking. Notices tremor with writing, but does not write  No sycnopal spells  Stable speech  Back pain is episodic with prolonged standing. She uses rest to treat this and tramadol  No falls  Using walker and cane.       Seeing ENT about hearing loss and needs hearing aides but decided against use/ can't afford.       Review of Systems   Constitutional: Negative for fever.   HENT: Negative for nosebleeds.    Eyes: Negative for double vision.   Respiratory: Negative for hemoptysis.    Cardiovascular: Negative for leg swelling.   Gastrointestinal: Negative for blood in stool.   Genitourinary: Negative for hematuria.   Musculoskeletal: Positive for back pain. Negative for falls.   Skin: Negative for rash.   Neurological: Positive for tremors.   Endo/Heme/Allergies: Does not bruise/bleed easily.         Gen Appearance, well developed/nourished in no apparent distress  CV: 2+ distal pulses with no edema or swelling  Neuro:  MS: Awake, alert, . Sustains attention. Recent/remote memory intact, Language is full to spontaneous speech/comprehension. Fund of Knowledge is full, and speech is normal today  CN:  PERRL, Extraoccular movements and visual fields are full. Normal facial sensation and strength, Hearing symmetric, Tongue and Palate are midline and strong. Shoulder Shrug symmetric and strong.  Motor: Normal bulk, tone, no abnormal movements at rest and no bradykinesia except for an occassional tongue smacking noted prior is not noted today-- no jaw tremor. Tremor with outstretched hands and action only, very mild 5/5 strength bilateral upper/lower extremities with 2+ reflexes   Sensory: symmetric to temp, and vibration.  Romberg negative  Cerebellar: Finger-nose Rapid alternating movements intact  Gait: Normal stance, no ataxia. Posture is good, mild difficulty with  rising from chair, no shuffling, no en bloc turning and no reduced arm swing (she does have reduction in walking speed today with arthralgic gait noted) Postural reflexes mildly impaired. She is using walker now    Imagin/3/2016 CT head: No acute intracranial process.   Moderate chronic ischemic microvascular change.    EEG: Clinical Impression:  Normal awake and  Drowsy EEG.      Echo showed normal EF    Holter showed: 12 beat run of nonsustained atrial tachycardia.    EKG showed NSR    Labs:110 LDL 2017 labs  2018: , CMP reviewed  2019     2017 MRI Brain: 1.  No MRI evidence of an acute intracranial abnormality.  2. Advanced atrophy and small vessel ischemic changes of the periventricular white matter.      2017 MRI Lumbar spine: 1.  Advanced facet degenerative changes with minimal anterolisthesis of L4 on L5 causing moderate acquired spinal stenosis.  Moderate right-sided foraminal encroachment.  2.  Degenerative changes at L2 and L3 with mild to moderate acquired spinal canal narrowing and foraminal encroachment.  3.  Degenerative disc bulge greater to the left at L1-L2.    2019 Bone scan: Osteopenia    Assessment/Plan: Alysia Ross is a 89 y.o. female with HTN, hyperlipidemia and remote CVA impacting speech now complaining of speech changes (word finding difficulty) (with no new changes by MRI-old lacunar change only)  and tremor with activities/ posture only. Looks more like essential tremor (jaw tremor noted  with no additional Parkinson's features)  A convulsive syncope provoked by maintained upright position during syncope in 2016.     I recommend:     1. EEG, neuro-exam,  and CT were reassuring for spell in 2016- Syncope spell likely vasovagally mediated and resolved  2. Metoprolol (given by another provider for atrial tach) is also helping tremor.   -Also continue Primidone 50mg TID for tremor which is well controlled. On treatment for osteoporosis with PCP  3. For Lumbar  pain: offered pain management consult, but she declined prior.  Neuropathic cream helped somewhat (note moderate spinal stenosis by Lumbar 1/2017). PT helped prior- PCP manages with tramadol now  4. Speech changes respond to Primidone/ may be tremor related. Vs Late effect CVA (dystonia) / repeat imaging benign prior  5. Clonazepam may be helping jaw tremor- now prescribed by PCP  6. Remote History of CVA noted-- For stroke prevention: Continue treatments for  HTN, Dyslipidemia (LDL goal less than 100)and continue ASA daily  7. Agree with her using a walker for fall prevention. Instructions on use given prior      RTC 1 year

## 2020-07-28 ENCOUNTER — PATIENT OUTREACH (OUTPATIENT)
Dept: ADMINISTRATIVE | Facility: HOSPITAL | Age: 85
End: 2020-07-28

## 2020-07-28 DIAGNOSIS — I10 ESSENTIAL HYPERTENSION: Primary | ICD-10-CM

## 2020-07-28 NOTE — PROGRESS NOTES
Health Maintenance Due   Topic Date Due    Shingles Vaccine (2 of 2) 2020    Lipid Panel  2020       Chart review completed 2020.  Care Everywhere updates requested and reviewed.  Immunizations reconciled. Media reports reviewed.  Duplicate HM overrides and  orders removed.  Overdue HM topic chart audit and/or requested.  Overdue lab testing linked to upcoming lab appointments if applies.      WOG placed for lipid panel

## 2020-07-29 ENCOUNTER — OFFICE VISIT (OUTPATIENT)
Dept: FAMILY MEDICINE | Facility: CLINIC | Age: 85
End: 2020-07-29
Payer: MEDICARE

## 2020-07-29 VITALS
WEIGHT: 205 LBS | SYSTOLIC BLOOD PRESSURE: 130 MMHG | DIASTOLIC BLOOD PRESSURE: 70 MMHG | HEART RATE: 62 BPM | RESPIRATION RATE: 16 BRPM | BODY MASS INDEX: 37.73 KG/M2 | HEIGHT: 62 IN | TEMPERATURE: 98 F

## 2020-07-29 DIAGNOSIS — E87.6 HYPOKALEMIA: ICD-10-CM

## 2020-07-29 DIAGNOSIS — R25.1 TREMOR: ICD-10-CM

## 2020-07-29 DIAGNOSIS — F32.A DEPRESSION, UNSPECIFIED DEPRESSION TYPE: ICD-10-CM

## 2020-07-29 DIAGNOSIS — E78.5 HYPERLIPIDEMIA, UNSPECIFIED HYPERLIPIDEMIA TYPE: ICD-10-CM

## 2020-07-29 DIAGNOSIS — M81.0 OSTEOPOROSIS, UNSPECIFIED OSTEOPOROSIS TYPE, UNSPECIFIED PATHOLOGICAL FRACTURE PRESENCE: ICD-10-CM

## 2020-07-29 DIAGNOSIS — E55.9 VITAMIN D DEFICIENCY DISEASE: ICD-10-CM

## 2020-07-29 DIAGNOSIS — I10 ESSENTIAL HYPERTENSION: Primary | ICD-10-CM

## 2020-07-29 LAB
ALBUMIN SERPL BCP-MCNC: 3.2 G/DL (ref 3.5–5.2)
ALP SERPL-CCNC: 80 U/L (ref 55–135)
ALT SERPL W/O P-5'-P-CCNC: 15 U/L (ref 10–44)
ANION GAP SERPL CALC-SCNC: 10 MMOL/L (ref 8–16)
AST SERPL-CCNC: 19 U/L (ref 10–40)
BASOPHILS # BLD AUTO: 0.04 K/UL (ref 0–0.2)
BASOPHILS NFR BLD: 0.5 % (ref 0–1.9)
BILIRUB SERPL-MCNC: 0.5 MG/DL (ref 0.1–1)
BUN SERPL-MCNC: 17 MG/DL (ref 8–23)
CALCIUM SERPL-MCNC: 9.7 MG/DL (ref 8.7–10.5)
CHLORIDE SERPL-SCNC: 101 MMOL/L (ref 95–110)
CHOLEST SERPL-MCNC: 177 MG/DL (ref 120–199)
CHOLEST/HDLC SERPL: 4.3 {RATIO} (ref 2–5)
CO2 SERPL-SCNC: 30 MMOL/L (ref 23–29)
CREAT SERPL-MCNC: 1.1 MG/DL (ref 0.5–1.4)
DIFFERENTIAL METHOD: ABNORMAL
EOSINOPHIL # BLD AUTO: 0.2 K/UL (ref 0–0.5)
EOSINOPHIL NFR BLD: 2.2 % (ref 0–8)
ERYTHROCYTE [DISTWIDTH] IN BLOOD BY AUTOMATED COUNT: 13.8 % (ref 11.5–14.5)
EST. GFR  (AFRICAN AMERICAN): 51 ML/MIN/1.73 M^2
EST. GFR  (NON AFRICAN AMERICAN): 45 ML/MIN/1.73 M^2
GLUCOSE SERPL-MCNC: 105 MG/DL (ref 70–110)
HCT VFR BLD AUTO: 37.7 % (ref 37–48.5)
HDLC SERPL-MCNC: 41 MG/DL (ref 40–75)
HDLC SERPL: 23.2 % (ref 20–50)
HGB BLD-MCNC: 12.2 G/DL (ref 12–16)
IMM GRANULOCYTES # BLD AUTO: 0.03 K/UL (ref 0–0.04)
IMM GRANULOCYTES NFR BLD AUTO: 0.4 % (ref 0–0.5)
LDLC SERPL CALC-MCNC: 112.6 MG/DL (ref 63–159)
LYMPHOCYTES # BLD AUTO: 1.4 K/UL (ref 1–4.8)
LYMPHOCYTES NFR BLD: 18.3 % (ref 18–48)
MCH RBC QN AUTO: 31.9 PG (ref 27–31)
MCHC RBC AUTO-ENTMCNC: 32.4 G/DL (ref 32–36)
MCV RBC AUTO: 98 FL (ref 82–98)
MONOCYTES # BLD AUTO: 0.7 K/UL (ref 0.3–1)
MONOCYTES NFR BLD: 9.4 % (ref 4–15)
NEUTROPHILS # BLD AUTO: 5.4 K/UL (ref 1.8–7.7)
NEUTROPHILS NFR BLD: 69.2 % (ref 38–73)
NONHDLC SERPL-MCNC: 136 MG/DL
NRBC BLD-RTO: 0 /100 WBC
PLATELET # BLD AUTO: 286 K/UL (ref 150–350)
PMV BLD AUTO: 9.5 FL (ref 9.2–12.9)
POTASSIUM SERPL-SCNC: 4.1 MMOL/L (ref 3.5–5.1)
PROT SERPL-MCNC: 7.5 G/DL (ref 6–8.4)
RBC # BLD AUTO: 3.83 M/UL (ref 4–5.4)
SODIUM SERPL-SCNC: 141 MMOL/L (ref 136–145)
TRIGL SERPL-MCNC: 117 MG/DL (ref 30–150)
TSH SERPL DL<=0.005 MIU/L-ACNC: 1.19 UIU/ML (ref 0.4–4)
WBC # BLD AUTO: 7.86 K/UL (ref 3.9–12.7)

## 2020-07-29 PROCEDURE — 85025 COMPLETE CBC W/AUTO DIFF WBC: CPT

## 2020-07-29 PROCEDURE — 99999 PR PBB SHADOW E&M-EST. PATIENT-LVL III: ICD-10-PCS | Mod: PBBFAC,,, | Performed by: FAMILY MEDICINE

## 2020-07-29 PROCEDURE — 99213 OFFICE O/P EST LOW 20 MIN: CPT | Mod: PBBFAC | Performed by: FAMILY MEDICINE

## 2020-07-29 PROCEDURE — 84443 ASSAY THYROID STIM HORMONE: CPT

## 2020-07-29 PROCEDURE — 36415 COLL VENOUS BLD VENIPUNCTURE: CPT | Mod: PBBFAC

## 2020-07-29 PROCEDURE — 80053 COMPREHEN METABOLIC PANEL: CPT

## 2020-07-29 PROCEDURE — 82306 VITAMIN D 25 HYDROXY: CPT

## 2020-07-29 PROCEDURE — 99999 PR PBB SHADOW E&M-EST. PATIENT-LVL III: CPT | Mod: PBBFAC,,, | Performed by: FAMILY MEDICINE

## 2020-07-29 PROCEDURE — 99999 PR STA SHADOW: CPT | Mod: PBBFAC,,, | Performed by: FAMILY MEDICINE

## 2020-07-29 PROCEDURE — 80061 LIPID PANEL: CPT

## 2020-07-29 PROCEDURE — 99214 OFFICE O/P EST MOD 30 MIN: CPT | Mod: S$PBB | Performed by: FAMILY MEDICINE

## 2020-07-29 RX ORDER — SIMVASTATIN 40 MG/1
40 TABLET, FILM COATED ORAL NIGHTLY
Qty: 90 TABLET | Refills: 0 | Status: SHIPPED | OUTPATIENT
Start: 2020-07-29 | End: 2020-12-14

## 2020-07-29 RX ORDER — PRIMIDONE 50 MG/1
50 TABLET ORAL 3 TIMES DAILY
Qty: 270 TABLET | Refills: 1 | Status: SHIPPED | OUTPATIENT
Start: 2020-07-29 | End: 2021-02-04 | Stop reason: SDUPTHER

## 2020-07-29 RX ORDER — AMLODIPINE BESYLATE 2.5 MG/1
2.5 TABLET ORAL DAILY
Qty: 90 TABLET | Refills: 1 | Status: SHIPPED | OUTPATIENT
Start: 2020-07-29 | End: 2021-02-04 | Stop reason: SDUPTHER

## 2020-07-29 RX ORDER — ALENDRONATE SODIUM 70 MG/1
70 TABLET ORAL
Qty: 12 TABLET | Refills: 1 | Status: SHIPPED | OUTPATIENT
Start: 2020-07-29 | End: 2021-02-04 | Stop reason: SDUPTHER

## 2020-07-29 RX ORDER — METOPROLOL SUCCINATE 25 MG/1
25 TABLET, EXTENDED RELEASE ORAL DAILY
Qty: 90 TABLET | Refills: 1 | Status: SHIPPED | OUTPATIENT
Start: 2020-07-29 | End: 2021-02-04 | Stop reason: SDUPTHER

## 2020-07-29 RX ORDER — CITALOPRAM 10 MG/1
10 TABLET ORAL DAILY
Qty: 90 TABLET | Refills: 1 | Status: SHIPPED | OUTPATIENT
Start: 2020-07-29 | End: 2021-02-04 | Stop reason: SDUPTHER

## 2020-07-29 RX ORDER — LISINOPRIL 40 MG/1
40 TABLET ORAL DAILY
Qty: 90 TABLET | Refills: 1 | Status: SHIPPED | OUTPATIENT
Start: 2020-07-29 | End: 2021-01-25

## 2020-07-29 RX ORDER — POTASSIUM CHLORIDE 20 MEQ/1
20 TABLET, EXTENDED RELEASE ORAL DAILY
Qty: 90 TABLET | Refills: 5 | Status: SHIPPED | OUTPATIENT
Start: 2020-07-29 | End: 2020-08-17

## 2020-07-29 NOTE — PROGRESS NOTES
Subjective:       Patient ID: Alysia Ross is a 89 y.o. female.    Chief Complaint: Follow-up    Pt is a 89 y.o. female who presents for evaluation and management of   Encounter Diagnoses   Name Primary?    Essential hypertension Yes    Vitamin D deficiency disease     Tremor     Depression, unspecified depression type     Hyperlipidemia, unspecified hyperlipidemia type     Osteoporosis, unspecified osteoporosis type, unspecified pathological fracture presence     Hypokalemia    .  Doing well on current meds. Denies any side effects. Prevention is up to date.  Review of Systems   Constitutional: Negative for chills and fever.   Respiratory: Negative for shortness of breath.    Cardiovascular: Negative for chest pain and palpitations.   Gastrointestinal: Negative for abdominal pain, blood in stool, constipation and nausea.   Genitourinary: Negative for difficulty urinating.   Psychiatric/Behavioral: Negative for dysphoric mood, sleep disturbance and suicidal ideas. The patient is not nervous/anxious.        Objective:      Physical Exam  Constitutional:       Appearance: She is well-developed.   HENT:      Head: Normocephalic and atraumatic.      Right Ear: External ear normal.      Left Ear: External ear normal.      Nose: Nose normal.   Eyes:      Pupils: Pupils are equal, round, and reactive to light.   Neck:      Musculoskeletal: Normal range of motion and neck supple.      Thyroid: No thyromegaly.      Vascular: No JVD.      Trachea: No tracheal deviation.   Cardiovascular:      Rate and Rhythm: Normal rate.      Heart sounds: Normal heart sounds. No murmur.   Pulmonary:      Effort: Pulmonary effort is normal. No respiratory distress.      Breath sounds: Normal breath sounds. No wheezing or rales.   Chest:      Chest wall: No tenderness.   Abdominal:      General: Bowel sounds are normal. There is no distension.      Palpations: Abdomen is soft. There is no mass.      Tenderness: There is no  abdominal tenderness. There is no guarding or rebound.   Musculoskeletal: Normal range of motion.         General: No tenderness.   Lymphadenopathy:      Cervical: No cervical adenopathy.   Skin:     General: Skin is warm and dry.      Coloration: Skin is not pale.      Findings: No erythema or rash.   Neurological:      Mental Status: She is alert and oriented to person, place, and time.      Cranial Nerves: No cranial nerve deficit.      Motor: No abnormal muscle tone.      Coordination: Coordination normal.      Deep Tendon Reflexes: Reflexes are normal and symmetric. Reflexes normal.   Psychiatric:         Behavior: Behavior normal.         Thought Content: Thought content normal.         Judgment: Judgment normal.         Assessment:       1. Essential hypertension    2. Vitamin D deficiency disease    3. Tremor    4. Depression, unspecified depression type    5. Hyperlipidemia, unspecified hyperlipidemia type    6. Osteoporosis, unspecified osteoporosis type, unspecified pathological fracture presence    7. Hypokalemia        Plan:   Alysia ROBERTS was seen today for follow-up.    Diagnoses and all orders for this visit:    Essential hypertension  -     CBC auto differential; Future  -     Comprehensive metabolic panel; Future  -     Lipid Panel; Future  -     TSH; Future  -     lisinopriL (PRINIVIL,ZESTRIL) 40 MG tablet; Take 1 tablet (40 mg total) by mouth once daily.  -     metoprolol succinate (TOPROL-XL) 25 MG 24 hr tablet; Take 1 tablet (25 mg total) by mouth once daily.  -     amLODIPine (NORVASC) 2.5 MG tablet; Take 1 tablet (2.5 mg total) by mouth once daily.    Vitamin D deficiency disease  -     Vitamin D; Future    Tremor  -     primidone (MYSOLINE) 50 MG Tab; Take 1 tablet (50 mg total) by mouth 3 (three) times daily.    Depression, unspecified depression type  -     citalopram (CELEXA) 10 MG tablet; Take 1 tablet (10 mg total) by mouth once daily.    Hyperlipidemia, unspecified hyperlipidemia type  -      simvastatin (ZOCOR) 40 MG tablet; Take 1 tablet (40 mg total) by mouth every evening.    Osteoporosis, unspecified osteoporosis type, unspecified pathological fracture presence  -     alendronate (FOSAMAX) 70 MG tablet; Take 1 tablet (70 mg total) by mouth every 7 days.    Hypokalemia  -     potassium chloride SA (K-DUR,KLOR-CON) 20 MEQ tablet; Take 1 tablet (20 mEq total) by mouth once daily.      Problem List Items Addressed This Visit     Depression    Relevant Medications    citalopram (CELEXA) 10 MG tablet    HTN (hypertension) - Primary    Relevant Medications    lisinopriL (PRINIVIL,ZESTRIL) 40 MG tablet    metoprolol succinate (TOPROL-XL) 25 MG 24 hr tablet    amLODIPine (NORVASC) 2.5 MG tablet    Other Relevant Orders    CBC auto differential    Comprehensive metabolic panel    Lipid Panel    TSH    Hyperlipidemia    Relevant Medications    simvastatin (ZOCOR) 40 MG tablet    Hypokalemia    Relevant Medications    potassium chloride SA (K-DUR,KLOR-CON) 20 MEQ tablet    Osteoporosis    Relevant Medications    alendronate (FOSAMAX) 70 MG tablet    Tremor    Relevant Medications    primidone (MYSOLINE) 50 MG Tab      Other Visit Diagnoses     Vitamin D deficiency disease        Relevant Orders    Vitamin D        No follow-ups on file.

## 2020-07-30 LAB — 25(OH)D3+25(OH)D2 SERPL-MCNC: 62 NG/ML (ref 30–96)

## 2020-08-06 ENCOUNTER — HOSPITAL ENCOUNTER (OUTPATIENT)
Facility: HOSPITAL | Age: 85
Discharge: HOME HEALTH | End: 2020-08-08
Attending: SURGERY | Admitting: FAMILY MEDICINE
Payer: MEDICARE

## 2020-08-06 DIAGNOSIS — S00.03XA CONTUSION OF SCALP, INITIAL ENCOUNTER: ICD-10-CM

## 2020-08-06 DIAGNOSIS — I25.10 CARDIOVASCULAR DISEASE: ICD-10-CM

## 2020-08-06 DIAGNOSIS — R09.02 HYPOXIA: ICD-10-CM
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
## 2020-08-06 LAB
ALBUMIN SERPL BCP-MCNC: 3.2 G/DL (ref 3.5–5.2)
ALLENS TEST: ABNORMAL
ALP SERPL-CCNC: 81 U/L (ref 55–135)
ALT SERPL W/O P-5'-P-CCNC: 14 U/L (ref 10–44)
ANION GAP SERPL CALC-SCNC: 10 MMOL/L (ref 8–16)
APTT BLDCRRT: 25.5 SEC (ref 21–32)
AST SERPL-CCNC: 16 U/L (ref 10–40)
BASOPHILS # BLD AUTO: 0.03 K/UL (ref 0–0.2)
BASOPHILS NFR BLD: 0.4 % (ref 0–1.9)
BILIRUB SERPL-MCNC: 0.4 MG/DL (ref 0.1–1)
BILIRUB UR QL STRIP: NEGATIVE
BNP SERPL-MCNC: 74 PG/ML (ref 0–99)
BUN SERPL-MCNC: 24 MG/DL (ref 8–23)
CALCIUM SERPL-MCNC: 9.2 MG/DL (ref 8.7–10.5)
CHLORIDE SERPL-SCNC: 98 MMOL/L (ref 95–110)
CK MB SERPL-MCNC: 0.6 NG/ML (ref 0.1–6.5)
CK MB SERPL-RTO: 0.7 % (ref 0–5)
CK SERPL-CCNC: 85 U/L (ref 20–180)
CK SERPL-CCNC: 85 U/L (ref 20–180)
CLARITY UR: CLEAR
CO2 SERPL-SCNC: 28 MMOL/L (ref 23–29)
COLOR UR: YELLOW
CREAT SERPL-MCNC: 1.4 MG/DL (ref 0.5–1.4)
D DIMER PPP IA.FEU-MCNC: 12.89 MG/L FEU
DELSYS: ABNORMAL
DIFFERENTIAL METHOD: ABNORMAL
EOSINOPHIL # BLD AUTO: 0.2 K/UL (ref 0–0.5)
EOSINOPHIL NFR BLD: 2.2 % (ref 0–8)
ERYTHROCYTE [DISTWIDTH] IN BLOOD BY AUTOMATED COUNT: 14 % (ref 11.5–14.5)
EST. GFR  (AFRICAN AMERICAN): 38 ML/MIN/1.73 M^2
EST. GFR  (NON AFRICAN AMERICAN): 33 ML/MIN/1.73 M^2
GLUCOSE SERPL-MCNC: 111 MG/DL (ref 70–110)
GLUCOSE UR QL STRIP: NEGATIVE
HCO3 UR-SCNC: 27.1 MMOL/L (ref 22–26)
HCT VFR BLD AUTO: 36.2 % (ref 37–48.5)
HGB BLD-MCNC: 11.6 G/DL (ref 12–16)
HGB UR QL STRIP: NEGATIVE
IMM GRANULOCYTES # BLD AUTO: 0.05 K/UL (ref 0–0.04)
IMM GRANULOCYTES NFR BLD AUTO: 0.7 % (ref 0–0.5)
INR PPP: 1 (ref 0.8–1.2)
KETONES UR QL STRIP: NEGATIVE
LEUKOCYTE ESTERASE UR QL STRIP: NEGATIVE
LYMPHOCYTES # BLD AUTO: 1.6 K/UL (ref 1–4.8)
LYMPHOCYTES NFR BLD: 21.6 % (ref 18–48)
MAGNESIUM SERPL-MCNC: 1.9 MG/DL (ref 1.6–2.6)
MCH RBC QN AUTO: 31.8 PG (ref 27–31)
MCHC RBC AUTO-ENTMCNC: 32 G/DL (ref 32–36)
MCV RBC AUTO: 99 FL (ref 82–98)
MONOCYTES # BLD AUTO: 0.9 K/UL (ref 0.3–1)
MONOCYTES NFR BLD: 12.3 % (ref 4–15)
NEUTROPHILS # BLD AUTO: 4.5 K/UL (ref 1.8–7.7)
NEUTROPHILS NFR BLD: 62.8 % (ref 38–73)
NITRITE UR QL STRIP: NEGATIVE
NRBC BLD-RTO: 0 /100 WBC
PCO2 BLDA: 39 MMHG (ref 35–45)
PH SMN: 7.45 [PH] (ref 7.35–7.45)
PH UR STRIP: 7 [PH] (ref 5–8)
PHOSPHATE SERPL-MCNC: 3.6 MG/DL (ref 2.7–4.5)
PLATELET # BLD AUTO: 237 K/UL (ref 150–350)
PMV BLD AUTO: 9.3 FL (ref 9.2–12.9)
PO2 BLDA: 47 MMHG (ref 75–100)
POC BE: 2.9 MMOL/L (ref -2–2)
POC COHB: 3.1 % (ref 0–3)
POC METHB: 0.6 % (ref 0–1.5)
POC O2HB ARTERIAL: 89.3 % (ref 94–100)
POC SATURATED O2: 92.7 % (ref 90–100)
POC TCO2: 28.3 MMOL/L
POC THB: 12.2 G/DL (ref 12–18)
POTASSIUM SERPL-SCNC: 4.4 MMOL/L (ref 3.5–5.1)
PROT SERPL-MCNC: 7.2 G/DL (ref 6–8.4)
PROT UR QL STRIP: NEGATIVE
PROTHROMBIN TIME: 10.8 SEC (ref 9–12.5)
RBC # BLD AUTO: 3.65 M/UL (ref 4–5.4)
SARS-COV-2 RDRP RESP QL NAA+PROBE: NEGATIVE
SITE: ABNORMAL
SODIUM SERPL-SCNC: 136 MMOL/L (ref 136–145)
SP GR UR STRIP: 1.02 (ref 1–1.03)
TROPONIN I SERPL DL<=0.01 NG/ML-MCNC: 0.03 NG/ML (ref 0–0.03)
TROPONIN I SERPL DL<=0.01 NG/ML-MCNC: <0.006 NG/ML (ref 0–0.03)
TSH SERPL DL<=0.005 MIU/L-ACNC: 1.33 UIU/ML (ref 0.4–4)
URN SPEC COLLECT METH UR: NORMAL
UROBILINOGEN UR STRIP-ACNC: NEGATIVE EU/DL
WBC # BLD AUTO: 7.18 K/UL (ref 3.9–12.7)

## 2020-08-06 PROCEDURE — 36415 COLL VENOUS BLD VENIPUNCTURE: CPT

## 2020-08-06 PROCEDURE — 84484 ASSAY OF TROPONIN QUANT: CPT

## 2020-08-06 PROCEDURE — 94761 N-INVAS EAR/PLS OXIMETRY MLT: CPT

## 2020-08-06 PROCEDURE — 82550 ASSAY OF CK (CPK): CPT

## 2020-08-06 PROCEDURE — 99285 EMERGENCY DEPT VISIT HI MDM: CPT | Mod: 25

## 2020-08-06 PROCEDURE — U0002 COVID-19 LAB TEST NON-CDC: HCPCS

## 2020-08-06 PROCEDURE — G0378 HOSPITAL OBSERVATION PER HR: HCPCS

## 2020-08-06 PROCEDURE — 81003 URINALYSIS AUTO W/O SCOPE: CPT

## 2020-08-06 PROCEDURE — 93010 EKG 12-LEAD: ICD-10-PCS | Mod: ,,, | Performed by: INTERNAL MEDICINE

## 2020-08-06 PROCEDURE — 82553 CREATINE MB FRACTION: CPT

## 2020-08-06 PROCEDURE — 82803 BLOOD GASES ANY COMBINATION: CPT | Performed by: NURSE PRACTITIONER

## 2020-08-06 PROCEDURE — 25000003 PHARM REV CODE 250: Performed by: SURGERY

## 2020-08-06 PROCEDURE — 85730 THROMBOPLASTIN TIME PARTIAL: CPT

## 2020-08-06 PROCEDURE — 83735 ASSAY OF MAGNESIUM: CPT

## 2020-08-06 PROCEDURE — 27000221 HC OXYGEN, UP TO 24 HOURS

## 2020-08-06 PROCEDURE — 80053 COMPREHEN METABOLIC PANEL: CPT

## 2020-08-06 PROCEDURE — 25500020 PHARM REV CODE 255: Performed by: SURGERY

## 2020-08-06 PROCEDURE — 83880 ASSAY OF NATRIURETIC PEPTIDE: CPT

## 2020-08-06 PROCEDURE — 94760 N-INVAS EAR/PLS OXIMETRY 1: CPT

## 2020-08-06 PROCEDURE — 85610 PROTHROMBIN TIME: CPT

## 2020-08-06 PROCEDURE — 85025 COMPLETE CBC W/AUTO DIFF WBC: CPT

## 2020-08-06 PROCEDURE — 93005 ELECTROCARDIOGRAM TRACING: CPT

## 2020-08-06 PROCEDURE — 84484 ASSAY OF TROPONIN QUANT: CPT | Mod: 91

## 2020-08-06 PROCEDURE — 84100 ASSAY OF PHOSPHORUS: CPT

## 2020-08-06 PROCEDURE — 25000003 PHARM REV CODE 250: Performed by: NURSE PRACTITIONER

## 2020-08-06 PROCEDURE — 93010 ELECTROCARDIOGRAM REPORT: CPT | Mod: ,,, | Performed by: INTERNAL MEDICINE

## 2020-08-06 PROCEDURE — 85379 FIBRIN DEGRADATION QUANT: CPT

## 2020-08-06 PROCEDURE — 84443 ASSAY THYROID STIM HORMONE: CPT

## 2020-08-06 PROCEDURE — 36600 WITHDRAWAL OF ARTERIAL BLOOD: CPT

## 2020-08-06 RX ORDER — ACETAMINOPHEN 325 MG/1
650 TABLET ORAL EVERY 8 HOURS PRN
Status: DISCONTINUED | OUTPATIENT
Start: 2020-08-06 | End: 2020-08-08 | Stop reason: HOSPADM

## 2020-08-06 RX ORDER — ACETAMINOPHEN 500 MG
1000 TABLET ORAL
Status: COMPLETED | OUTPATIENT
Start: 2020-08-06 | End: 2020-08-06

## 2020-08-06 RX ORDER — SODIUM CHLORIDE 0.9 % (FLUSH) 0.9 %
10 SYRINGE (ML) INJECTION
Status: DISCONTINUED | OUTPATIENT
Start: 2020-08-06 | End: 2020-08-08 | Stop reason: HOSPADM

## 2020-08-06 RX ORDER — IPRATROPIUM BROMIDE AND ALBUTEROL SULFATE 2.5; .5 MG/3ML; MG/3ML
3 SOLUTION RESPIRATORY (INHALATION) EVERY 6 HOURS PRN
Status: DISCONTINUED | OUTPATIENT
Start: 2020-08-06 | End: 2020-08-08 | Stop reason: HOSPADM

## 2020-08-06 RX ORDER — HYDROCODONE BITARTRATE AND ACETAMINOPHEN 5; 325 MG/1; MG/1
1 TABLET ORAL
Status: COMPLETED | OUTPATIENT
Start: 2020-08-06 | End: 2020-08-06

## 2020-08-06 RX ORDER — ONDANSETRON 2 MG/ML
4 INJECTION INTRAMUSCULAR; INTRAVENOUS EVERY 8 HOURS PRN
Status: DISCONTINUED | OUTPATIENT
Start: 2020-08-06 | End: 2020-08-08 | Stop reason: HOSPADM

## 2020-08-06 RX ORDER — IPRATROPIUM BROMIDE AND ALBUTEROL SULFATE 2.5; .5 MG/3ML; MG/3ML
3 SOLUTION RESPIRATORY (INHALATION) EVERY 4 HOURS
Status: DISCONTINUED | OUTPATIENT
Start: 2020-08-07 | End: 2020-08-06

## 2020-08-06 RX ADMIN — ACETAMINOPHEN 1000 MG: 500 TABLET ORAL at 04:08

## 2020-08-06 RX ADMIN — HYDROCODONE BITARTRATE AND ACETAMINOPHEN 1 TABLET: 5; 325 TABLET ORAL at 06:08

## 2020-08-06 RX ADMIN — IOHEXOL 75 ML: 350 INJECTION, SOLUTION INTRAVENOUS at 07:08

## 2020-08-06 NOTE — ED PROVIDER NOTES
Encounter Date: 8/6/2020       History     Chief Complaint   Patient presents with    Fall     Alysia Ross is a 89 y.o. female with PMH of anxiety, depression, HTN, Hyperlipidemia, CVA who presents to the Ed for evaluation after fall.  Patient reports that she was walking with walker when she lost her balance, fell, and hit her head.   She presents with posterior head pain, contusion, lower back pain.  She denies loc; denies dizziness or blurry vision. She denies use of blood thinners.   She report chronic hx of lower back pain; worse after fall. Denies numbness or tingling to BLEs, denies bowel or bladder dysfunction.   She denies neck pain, reports ambulatory after fall.     The history is provided by the patient.   Fall  The accident occurred just prior to arrival. The fall occurred while walking (while walking with walker). She landed on a hard floor. The point of impact was the head. The pain is present in the back and head. She was ambulatory at the scene. There was no drug use involved in the accident. Associated symptoms include back pain and headaches. Pertinent negatives include no fever, no numbness, no abdominal pain and no nausea. She has tried nothing for the symptoms.     Review of patient's allergies indicates:   Allergen Reactions    Benicar [olmesartan]      Elevated bp    Penicillins      Other reaction(s): Unknown    Sulfa (sulfonamide antibiotics)      Other reaction(s): Unknown     Past Medical History:   Diagnosis Date    Anxiety     Depression     Hyperlipidemia     Hypertension     Stroke      Past Surgical History:   Procedure Laterality Date    APPENDECTOMY  1960    HYSTERECTOMY  1960s    KIDNEY STONE SURGERY  1950s     Family History   Problem Relation Age of Onset    No Known Problems Mother     Heart disease Father     Heart attack Father     Cancer Brother         lung cancer    Thyroid disease Daughter     Hypertension Daughter     Kidney disease Daughter          kidney stones    Parkinsonism Brother     Dementia Brother     Hypertension Brother     Cancer Son     Hypertension Son     Restless legs syndrome Daughter     Arthritis Daughter         knees    Hypertension Daughter     Parkinsonism Son      Social History     Tobacco Use    Smoking status: Never Smoker    Smokeless tobacco: Never Used   Substance Use Topics    Alcohol use: No    Drug use: No     Review of Systems   Constitutional: Negative for activity change, chills and fever.   HENT: Negative.  Negative for congestion, ear discharge, ear pain, postnasal drip, sinus pressure, sinus pain and sore throat.    Eyes: Negative.    Respiratory: Negative.  Negative for cough, chest tightness and shortness of breath.    Cardiovascular: Negative.  Negative for chest pain.   Gastrointestinal: Negative.  Negative for abdominal distention, abdominal pain and nausea.   Endocrine: Negative.    Genitourinary: Negative.  Negative for dysuria, frequency and urgency.   Musculoskeletal: Positive for arthralgias, back pain and gait problem.   Skin: Positive for wound (right upper arm abrasion). Negative for rash.   Allergic/Immunologic: Negative.    Neurological: Positive for speech difficulty (right sided facial droop d/t previous CVA) and headaches. Negative for dizziness, weakness, light-headedness and numbness.   Hematological: Negative.  Does not bruise/bleed easily.   Psychiatric/Behavioral: Negative.        Physical Exam     Initial Vitals   BP Pulse Resp Temp SpO2   08/06/20 1543 08/06/20 1543 08/06/20 1543 08/06/20 1542 08/06/20 1543   (!) 147/70 67 12 97.7 °F (36.5 °C) (!) 92 %      MAP       --                Physical Exam    Nursing note and vitals reviewed.  Constitutional: She appears well-developed and well-nourished.   HENT:   Head: Normocephalic. Head is with contusion.       Right Ear: Tympanic membrane, external ear and ear canal normal. Tympanic membrane is not erythematous. No middle ear  effusion.   Left Ear: Tympanic membrane, external ear and ear canal normal. Tympanic membrane is not erythematous.  No middle ear effusion.   Nose: Nose normal.   Mouth/Throat: Uvula is midline, oropharynx is clear and moist and mucous membranes are normal. Mucous membranes are not pale and not dry.   Eyes: Conjunctivae and EOM are normal. Pupils are equal, round, and reactive to light.   Neck: Normal range of motion. Neck supple.   Cardiovascular: Normal rate, regular rhythm, S1 normal, S2 normal, normal heart sounds, intact distal pulses and normal pulses.  Occasional extrasystoles (SR occasional PVCs) are present.  Exam reveals no gallop, no distant heart sounds, no friction rub and no decreased pulses.    No murmur (No murmur ) heard.  Trace edema   Pulmonary/Chest: Effort normal. She has no decreased breath sounds. She has no wheezes. She has no rhonchi. She has rales (bibasilar rales).   Abdominal: Soft. Bowel sounds are normal. There is no abdominal tenderness.   Musculoskeletal:      Lumbar back: She exhibits decreased range of motion and tenderness.   Neurological: She is alert and oriented to person, place, and time. She has normal strength. She displays normal reflexes. No cranial nerve deficit or sensory deficit.   Skin: Skin is warm and dry. Capillary refill takes less than 2 seconds. No rash noted.   Psychiatric: She has a normal mood and affect. Her behavior is normal. Judgment and thought content normal.         ED Course   Procedures  Labs Reviewed   CBC W/ AUTO DIFFERENTIAL - Abnormal; Notable for the following components:       Result Value    RBC 3.65 (*)     Hemoglobin 11.6 (*)     Hematocrit 36.2 (*)     Mean Corpuscular Volume 99 (*)     Mean Corpuscular Hemoglobin 31.8 (*)     Immature Granulocytes 0.7 (*)     Immature Grans (Abs) 0.05 (*)     All other components within normal limits   COMPREHENSIVE METABOLIC PANEL - Abnormal; Notable for the following components:    Glucose 111 (*)     BUN,  Bld 24 (*)     Albumin 3.2 (*)     eGFR if  38 (*)     eGFR if non  33 (*)     All other components within normal limits   D DIMER, QUANTITATIVE - Abnormal; Notable for the following components:    D-Dimer 12.89 (*)     All other components within normal limits   B-TYPE NATRIURETIC PEPTIDE   CK   CK-MB   TROPONIN I   MAGNESIUM   PHOSPHORUS   D DIMER, QUANTITATIVE   APTT   PROTIME-INR   TSH   SARS-COV-2 RNA AMPLIFICATION, QUAL   PHOSPHORUS   APTT   MAGNESIUM   PROTIME-INR   TSH   URINALYSIS, REFLEX TO URINE CULTURE    Narrative:     Specimen Source->Urine          Imaging Results          CTA Chest Non-Coronary (Final result)  Result time 08/06/20 19:39:27    Final result by Marii Jimenez MD (Timothy) (08/06/20 19:39:27)                 Impression:      Negative CTA of the chest. No evidence of pulmonary embolism.    Slight haziness of the lungs bilaterally with areas of mosaic lung density at the bases.  Findings are nonspecific but could be related to air trapping.    All CT scans at this facility use dose modulation, iterative reconstruction and/or weight based dosing when appropriate to reduce radiation dose to as low as reasonably achievable.      Electronically signed by: Marii Jimenez MD  Date:    08/06/2020  Time:    19:39             Narrative:    EXAMINATION:  CTA CHEST NON CORONARY    CLINICAL HISTORY:  PE suspected, intermediate prob, positive D-dimer;    TECHNIQUE:  Standard CTA of the chest performed with 100 cc Omnipaque 350 utilizing 3-D MIP reformations.    COMPARISON:  None    FINDINGS:  Cardiac size is normal. Major pulmonary arteries are normal.  No evidence of pulmonary embolism.  There is atherosclerosis of thoracic aorta no evidence of dissection.    Slight haziness of the lungs diffusely with areas mosaic lung density at the bases.  Findings are nonspecific and could reflect air trapping.  No pleural effusions.    Small hiatal hernia.                                X-Ray Pelvis Routine AP (Final result)  Result time 08/06/20 16:30:18    Final result by ROSA ELENA Latham Sr., MD (08/06/20 16:30:18)                 Impression:      1. No fracture or dislocation  2. There are mild osteoarthritic changes in the pubic symphysis.  3. There is a prominent amount of fecal material within the colon.  4. There is a moderate amount of atherosclerosis.      Electronically signed by: Eligio Latham MD  Date:    08/06/2020  Time:    16:30             Narrative:    EXAMINATION:  XR PELVIS ROUTINE AP    CLINICAL HISTORY:  fall;    COMPARISON:  None    FINDINGS:  There is no fracture. There is no dislocation.  There are mild osteoarthritic changes in the pubic symphysis.  There is a prominent amount of fecal material within the colon.  There is a moderate amount of atherosclerosis.                               X-Ray Chest 1 View (Final result)  Result time 08/06/20 16:28:11    Final result by ROSA ELENA Latham Sr., MD (08/06/20 16:28:11)                 Impression:      The size of the heart is prominent. There is a mild amount of interstitial opacities seen in both lungs with Kerley B-lines visualized bilaterally.  This is characteristic of pulmonary edema.      Electronically signed by: Eligio Latham MD  Date:    08/06/2020  Time:    16:28             Narrative:    EXAMINATION:  XR CHEST 1 VIEW    CLINICAL HISTORY:  Unspecified fall, initial encounter    COMPARISON:  None    FINDINGS:  The size of the heart is prominent.  There is a mild amount of interstitial opacities seen in both lungs with Kerley B-lines visualized bilaterally.  There is no pneumothorax.  The costophrenic angles are sharp.                               X-Ray Lumbar Spine Ap And Lateral (Final result)  Result time 08/06/20 16:26:32    Final result by ROSA ELENA Latham Sr., MD (08/06/20 16:26:32)                 Impression:      1. There is grade 2 anterolisthesis of L4 on L5.  2. There are mild degenerative  changes between T7 and L2.  3. There is a prominent amount of fecal material within the colon.  4. There is a moderate amount of atherosclerosis.  5. There are mild osteoarthritic changes in the pubic symphysis.      Electronically signed by: Eligio Latham MD  Date:    08/06/2020  Time:    16:26             Narrative:    EXAMINATION:  XR LUMBAR SPINE AP AND LATERAL    CLINICAL HISTORY:  Back pain or radiculopathy, < 6 wks, uncomplicated;Back pain or radiculopathy, trauma;    COMPARISON:  None    FINDINGS:  There are 5 lumbar type vertebral bodies.  There is grade 2 anterolisthesis of L4 on L5.  There is no fracture or scoliosis. There is normal lumbar lordosis.  There are mild degenerative changes between T7 and L2.  There is a moderate amount of atherosclerosis.  There is a prominent amount of fecal material within the colon.  There are mild osteoarthritic changes in the pubic symphysis.                               CT Head Without Contrast (Final result)  Result time 08/06/20 16:15:34    Final result by Billy Witt Jr., MD (08/06/20 16:15:34)                 Impression:      1. No acute findings.  2. Chronic lacunar infarct within the right basal ganglia with white matter patchy low-density of the likely relates to chronic microvascular ischemia.  All CT scans at this facility use dose modulation, iterative reconstruction, and/or weight base dosing when appropriate to reduce radiation dose to as low as reasonably achievable.      Electronically signed by: Billy Witt Jr., MD  Date:    08/06/2020  Time:    16:15             Narrative:    EXAMINATION:  CT HEAD WITHOUT CONTRAST    CLINICAL HISTORY:  Head trauma, minor (Age => 65y);    TECHNIQUE:  Contiguous axial CT images were obtained from the skull base through the vertex without intravenous contrast.    COMPARISON:  Prior MRI from January 23, 2017.    FINDINGS:  No intracranial hemorrhage. No mass effect or midline shift. Chronic lacunar infarct within  the right lentiform nucleus.  Mild patchy low-density within the periventricular and deep white matter.  This is most extensive within the frontal lobes bilaterally.  The ventricles and sulci are normal in size and configuration. The paranasal sinuses and mastoid air cells are clear.  No concerning osseous findings.                                 Medical Decision Making:   Initial Assessment:   89 yo with fall, head trauma and lower back pain.   Contusion noted to posterior head; no laceration or bleeding.   Neuro exam WNL  +ttp L/S spine on exam; no evidence of cauda equina syndrome.   Initial 02 sats 86-87% RA; denies sob; + basilar rales on exam. Denies ho CHF/ oxygen use. Denies dyspnea.                                                                                                                                                                                                       Clinical Tests:   Lab Tests: Ordered and Reviewed  Radiological Study: Ordered and Reviewed  ED Management:  Cardiac monitoring  CT head negative for acute process  L spine negative for acute process  CXR: The size of the heart is prominent. There is a mild amount of interstitial opacities seen in both lungs with Kerley B-lines visualized bilaterally.  This is characteristic of pulmonary edema.  EKG read with MD at the time it was performed. EKG without concerning findings.   ABG (room air) with P02 47, 02 89.3%; placed on 02 2L/NC                                    Clinical Impression:       ICD-10-CM ICD-9-CM   1. Fall  W19.XXXA E888.9   2. Contusion of scalp, initial encounter  S00.03XA 920   3. Hypoxia  R09.02 799.02             ED Disposition Condition    Observation                This Patient Was Turned Over To Me From The Nurse Practitioner     -- I took over this note from the nurse practitioner this shift  -- His/her documentation and exam is above this line, my documentation is below  -- patient with a slip and fall at  home, negative trauma workup today in the ER  -- patient however has an oxygenation in the 80%, as evidence by ABG  -- unexplained hypoxia with negative workup today including CT PE study  -- patient has needed oxygen in the ER to maintain sat above 90%  -- admit with Cardiology and pulmonology consult for hypoxia evaluation  -- serial cardiac enzymes & general maintenance, observation overnight                  Kirit Simon MD  08/06/20 1953

## 2020-08-06 NOTE — ED NOTES
Patient placed on continuous cardiac monitor, automatic blood pressure cuff and continuous pulse oximeter.     Rosa Hankins, Patient Care Assistant  08/06/20 [id number]

## 2020-08-06 NOTE — ED NOTES
Bed: ED 06  Expected date:   Expected time:   Means of arrival:   Comments:  LATRICE Hankins, Patient Care Assistant  08/06/20 0204

## 2020-08-07 PROBLEM — R09.02 HYPOXIA: Status: ACTIVE | Noted: 2020-08-07

## 2020-08-07 LAB
ALBUMIN SERPL BCP-MCNC: 3 G/DL (ref 3.5–5.2)
ALP SERPL-CCNC: 71 U/L (ref 55–135)
ALT SERPL W/O P-5'-P-CCNC: 13 U/L (ref 10–44)
ANION GAP SERPL CALC-SCNC: 10 MMOL/L (ref 8–16)
AST SERPL-CCNC: 17 U/L (ref 10–40)
BASOPHILS # BLD AUTO: 0.02 K/UL (ref 0–0.2)
BASOPHILS NFR BLD: 0.2 % (ref 0–1.9)
BILIRUB SERPL-MCNC: 0.5 MG/DL (ref 0.1–1)
BUN SERPL-MCNC: 19 MG/DL (ref 8–23)
CALCIUM SERPL-MCNC: 9.1 MG/DL (ref 8.7–10.5)
CHLORIDE SERPL-SCNC: 99 MMOL/L (ref 95–110)
CO2 SERPL-SCNC: 28 MMOL/L (ref 23–29)
CREAT SERPL-MCNC: 1.1 MG/DL (ref 0.5–1.4)
DIFFERENTIAL METHOD: ABNORMAL
EOSINOPHIL # BLD AUTO: 0.3 K/UL (ref 0–0.5)
EOSINOPHIL NFR BLD: 2.9 % (ref 0–8)
ERYTHROCYTE [DISTWIDTH] IN BLOOD BY AUTOMATED COUNT: 13.8 % (ref 11.5–14.5)
EST. GFR  (AFRICAN AMERICAN): 51 ML/MIN/1.73 M^2
EST. GFR  (NON AFRICAN AMERICAN): 45 ML/MIN/1.73 M^2
GLUCOSE SERPL-MCNC: 123 MG/DL (ref 70–110)
HCT VFR BLD AUTO: 35.5 % (ref 37–48.5)
HGB BLD-MCNC: 11.6 G/DL (ref 12–16)
IMM GRANULOCYTES # BLD AUTO: 0.01 K/UL (ref 0–0.04)
IMM GRANULOCYTES NFR BLD AUTO: 0.1 % (ref 0–0.5)
LYMPHOCYTES # BLD AUTO: 1.4 K/UL (ref 1–4.8)
LYMPHOCYTES NFR BLD: 15.3 % (ref 18–48)
MCH RBC QN AUTO: 31.8 PG (ref 27–31)
MCHC RBC AUTO-ENTMCNC: 32.7 G/DL (ref 32–36)
MCV RBC AUTO: 97 FL (ref 82–98)
MONOCYTES # BLD AUTO: 0.9 K/UL (ref 0.3–1)
MONOCYTES NFR BLD: 9.6 % (ref 4–15)
NEUTROPHILS # BLD AUTO: 6.5 K/UL (ref 1.8–7.7)
NEUTROPHILS NFR BLD: 71.9 % (ref 38–73)
NRBC BLD-RTO: 0 /100 WBC
PLATELET # BLD AUTO: 222 K/UL (ref 150–350)
PMV BLD AUTO: 9 FL (ref 9.2–12.9)
POTASSIUM SERPL-SCNC: 4 MMOL/L (ref 3.5–5.1)
PROT SERPL-MCNC: 6.9 G/DL (ref 6–8.4)
RBC # BLD AUTO: 3.65 M/UL (ref 4–5.4)
SODIUM SERPL-SCNC: 137 MMOL/L (ref 136–145)
TROPONIN I SERPL DL<=0.01 NG/ML-MCNC: 0.01 NG/ML (ref 0–0.03)
TROPONIN I SERPL DL<=0.01 NG/ML-MCNC: 0.01 NG/ML (ref 0–0.03)
WBC # BLD AUTO: 8.98 K/UL (ref 3.9–12.7)

## 2020-08-07 PROCEDURE — 25000003 PHARM REV CODE 250: Performed by: NURSE PRACTITIONER

## 2020-08-07 PROCEDURE — 94760 N-INVAS EAR/PLS OXIMETRY 1: CPT

## 2020-08-07 PROCEDURE — 27000221 HC OXYGEN, UP TO 24 HOURS

## 2020-08-07 PROCEDURE — 85025 COMPLETE CBC W/AUTO DIFF WBC: CPT

## 2020-08-07 PROCEDURE — 99220 PR INITIAL OBSERVATION CARE,LEVL III: CPT | Mod: ,,, | Performed by: FAMILY MEDICINE

## 2020-08-07 PROCEDURE — 25000003 PHARM REV CODE 250: Performed by: FAMILY MEDICINE

## 2020-08-07 PROCEDURE — 94761 N-INVAS EAR/PLS OXIMETRY MLT: CPT

## 2020-08-07 PROCEDURE — 80053 COMPREHEN METABOLIC PANEL: CPT

## 2020-08-07 PROCEDURE — 97116 GAIT TRAINING THERAPY: CPT

## 2020-08-07 PROCEDURE — 63600175 PHARM REV CODE 636 W HCPCS: Performed by: NURSE PRACTITIONER

## 2020-08-07 PROCEDURE — 99220 PR INITIAL OBSERVATION CARE,LEVL III: ICD-10-PCS | Mod: ,,, | Performed by: FAMILY MEDICINE

## 2020-08-07 PROCEDURE — 36415 COLL VENOUS BLD VENIPUNCTURE: CPT

## 2020-08-07 PROCEDURE — 93005 ELECTROCARDIOGRAM TRACING: CPT

## 2020-08-07 PROCEDURE — G0378 HOSPITAL OBSERVATION PER HR: HCPCS

## 2020-08-07 PROCEDURE — 93010 ELECTROCARDIOGRAM REPORT: CPT | Mod: ,,, | Performed by: INTERNAL MEDICINE

## 2020-08-07 PROCEDURE — 96374 THER/PROPH/DIAG INJ IV PUSH: CPT

## 2020-08-07 PROCEDURE — 84484 ASSAY OF TROPONIN QUANT: CPT

## 2020-08-07 PROCEDURE — 93010 EKG 12-LEAD: ICD-10-PCS | Mod: ,,, | Performed by: INTERNAL MEDICINE

## 2020-08-07 PROCEDURE — 97162 PT EVAL MOD COMPLEX 30 MIN: CPT

## 2020-08-07 RX ORDER — PRIMIDONE 50 MG/1
50 TABLET ORAL 3 TIMES DAILY
Status: DISCONTINUED | OUTPATIENT
Start: 2020-08-07 | End: 2020-08-08 | Stop reason: HOSPADM

## 2020-08-07 RX ORDER — POTASSIUM CHLORIDE 20 MEQ/1
20 TABLET, EXTENDED RELEASE ORAL DAILY
Status: DISCONTINUED | OUTPATIENT
Start: 2020-08-07 | End: 2020-08-08 | Stop reason: HOSPADM

## 2020-08-07 RX ORDER — LISINOPRIL 40 MG/1
40 TABLET ORAL DAILY
Status: DISCONTINUED | OUTPATIENT
Start: 2020-08-07 | End: 2020-08-08 | Stop reason: HOSPADM

## 2020-08-07 RX ORDER — KETOROLAC TROMETHAMINE 15 MG/ML
15 INJECTION, SOLUTION INTRAMUSCULAR; INTRAVENOUS ONCE
Status: COMPLETED | OUTPATIENT
Start: 2020-08-07 | End: 2020-08-07

## 2020-08-07 RX ORDER — SIMVASTATIN 40 MG/1
40 TABLET, FILM COATED ORAL NIGHTLY
Status: DISCONTINUED | OUTPATIENT
Start: 2020-08-07 | End: 2020-08-08 | Stop reason: HOSPADM

## 2020-08-07 RX ORDER — SPIRONOLACTONE 25 MG/1
25 TABLET ORAL DAILY
Status: DISCONTINUED | OUTPATIENT
Start: 2020-08-07 | End: 2020-08-08 | Stop reason: HOSPADM

## 2020-08-07 RX ORDER — HYDROCHLOROTHIAZIDE 25 MG/1
25 TABLET ORAL DAILY
Status: DISCONTINUED | OUTPATIENT
Start: 2020-08-07 | End: 2020-08-08 | Stop reason: HOSPADM

## 2020-08-07 RX ORDER — CLONAZEPAM 0.5 MG/1
0.5 TABLET ORAL DAILY PRN
Status: DISCONTINUED | OUTPATIENT
Start: 2020-08-07 | End: 2020-08-08 | Stop reason: HOSPADM

## 2020-08-07 RX ORDER — TRAMADOL HYDROCHLORIDE 50 MG/1
50 TABLET ORAL EVERY 6 HOURS PRN
Status: DISCONTINUED | OUTPATIENT
Start: 2020-08-07 | End: 2020-08-08 | Stop reason: HOSPADM

## 2020-08-07 RX ORDER — CITALOPRAM 10 MG/1
10 TABLET ORAL DAILY
Status: DISCONTINUED | OUTPATIENT
Start: 2020-08-07 | End: 2020-08-08 | Stop reason: HOSPADM

## 2020-08-07 RX ORDER — AMLODIPINE BESYLATE 2.5 MG/1
2.5 TABLET ORAL DAILY
Status: DISCONTINUED | OUTPATIENT
Start: 2020-08-07 | End: 2020-08-08 | Stop reason: HOSPADM

## 2020-08-07 RX ORDER — METOPROLOL SUCCINATE 25 MG/1
25 TABLET, EXTENDED RELEASE ORAL DAILY
Status: DISCONTINUED | OUTPATIENT
Start: 2020-08-07 | End: 2020-08-08 | Stop reason: HOSPADM

## 2020-08-07 RX ADMIN — KETOROLAC TROMETHAMINE 15 MG: 15 INJECTION, SOLUTION INTRAMUSCULAR; INTRAVENOUS at 02:08

## 2020-08-07 RX ADMIN — TRAMADOL HYDROCHLORIDE 50 MG: 50 TABLET, FILM COATED ORAL at 05:08

## 2020-08-07 RX ADMIN — PRIMIDONE 50 MG: 50 TABLET ORAL at 10:08

## 2020-08-07 RX ADMIN — AMLODIPINE BESYLATE 2.5 MG: 2.5 TABLET ORAL at 10:08

## 2020-08-07 RX ADMIN — HYDROCHLOROTHIAZIDE 25 MG: 25 TABLET ORAL at 10:08

## 2020-08-07 RX ADMIN — CITALOPRAM HYDROBROMIDE 10 MG: 10 TABLET ORAL at 10:08

## 2020-08-07 RX ADMIN — POTASSIUM CHLORIDE 20 MEQ: 1500 TABLET, EXTENDED RELEASE ORAL at 10:08

## 2020-08-07 RX ADMIN — PRIMIDONE 50 MG: 50 TABLET ORAL at 02:08

## 2020-08-07 RX ADMIN — PRIMIDONE 50 MG: 50 TABLET ORAL at 08:08

## 2020-08-07 RX ADMIN — LISINOPRIL 40 MG: 40 TABLET ORAL at 10:08

## 2020-08-07 RX ADMIN — SIMVASTATIN 40 MG: 40 TABLET, FILM COATED ORAL at 08:08

## 2020-08-07 RX ADMIN — SPIRONOLACTONE 25 MG: 25 TABLET ORAL at 10:08

## 2020-08-07 RX ADMIN — METOPROLOL SUCCINATE 25 MG: 25 TABLET, EXTENDED RELEASE ORAL at 10:08

## 2020-08-07 NOTE — PROGRESS NOTES
Spoke to Ms. Ross about the purpose of pharmacy education. Patient verified name/ via phone. States her medications are familiar and she is compliant. Discussed pain control; states she has pain in her back but received a shot (toradol). Discussed antiemetics. Ms. Ross has declined bedside delivery of any discharge medications.

## 2020-08-07 NOTE — SUBJECTIVE & OBJECTIVE
Past Medical History:   Diagnosis Date    Anxiety     Depression     Hyperlipidemia     Hypertension     Stroke        Past Surgical History:   Procedure Laterality Date    APPENDECTOMY  1960    HYSTERECTOMY  1960s    KIDNEY STONE SURGERY  1950s       Review of patient's allergies indicates:   Allergen Reactions    Benicar [olmesartan]      Elevated bp    Penicillins      Other reaction(s): Unknown    Sulfa (sulfonamide antibiotics)      Other reaction(s): Unknown       No current facility-administered medications on file prior to encounter.      Current Outpatient Medications on File Prior to Encounter   Medication Sig    alendronate (FOSAMAX) 70 MG tablet Take 1 tablet (70 mg total) by mouth every 7 days.    amLODIPine (NORVASC) 2.5 MG tablet Take 1 tablet (2.5 mg total) by mouth once daily.    aspirin (ECOTRIN) 81 MG EC tablet Take 1 tablet by mouth Daily.    citalopram (CELEXA) 10 MG tablet Take 1 tablet (10 mg total) by mouth once daily.    clonazePAM (KLONOPIN) 0.5 MG tablet Take 1/2 (one-half) tablet by mouth once daily    lisinopriL (PRINIVIL,ZESTRIL) 40 MG tablet Take 1 tablet (40 mg total) by mouth once daily.    metoprolol succinate (TOPROL-XL) 25 MG 24 hr tablet Take 1 tablet (25 mg total) by mouth once daily.    omega-3 fatty acids 1,000 mg Cap Take 3 capsules by mouth Daily.    potassium chloride SA (K-DUR,KLOR-CON) 20 MEQ tablet Take 1 tablet (20 mEq total) by mouth once daily.    primidone (MYSOLINE) 50 MG Tab Take 1 tablet (50 mg total) by mouth 3 (three) times daily.    simvastatin (ZOCOR) 40 MG tablet Take 1 tablet (40 mg total) by mouth every evening.    spironolactone-hydrochlorothiazide 25-25mg (ALDACTAZIDE) 25-25 mg Tab Take 1 tablet by mouth once daily    collagenase (SANTYL) ointment Apply topically once daily.    traMADoL (ULTRAM) 50 mg tablet Take 1 tablet (50 mg total) by mouth every 6 (six) hours as needed. GREATER THAN 7 DAY QUANTITY MEDICALLY NECESSARY      Family History     Problem Relation (Age of Onset)    Arthritis Daughter    Cancer Brother, Son    Dementia Brother    Heart attack Father    Heart disease Father    Hypertension Daughter, Brother, Son, Daughter    Kidney disease Daughter    No Known Problems Mother    Parkinsonism Brother, Son    Restless legs syndrome Daughter    Thyroid disease Daughter        Tobacco Use    Smoking status: Never Smoker    Smokeless tobacco: Never Used   Substance and Sexual Activity    Alcohol use: No    Drug use: No    Sexual activity: Never     Review of Systems   Constitutional: Negative for chills, fatigue and fever.   HENT: Negative for congestion, ear pain, postnasal drip, sinus pressure, sneezing and sore throat.    Eyes: Negative for discharge.   Respiratory: Negative for cough, chest tightness and shortness of breath.    Cardiovascular: Negative.    Gastrointestinal: Negative for abdominal pain, constipation, diarrhea, nausea and vomiting.   Genitourinary: Negative for difficulty urinating, flank pain and hematuria.   Musculoskeletal: Positive for arthralgias and back pain. Negative for joint swelling.   Skin: Negative.    Neurological: Negative for dizziness and headaches.        Recurrent falls after shingles   Psychiatric/Behavioral: Negative for behavioral problems and confusion.     Objective:     Vital Signs (Most Recent):  Temp: 97.8 °F (36.6 °C) (08/07/20 0436)  Pulse: 60 (08/07/20 0600)  Resp: 16 (08/07/20 0436)  BP: (!) 144/64 (08/07/20 0436)  SpO2: 95 % (08/07/20 0558) Vital Signs (24h Range):  Temp:  [97.6 °F (36.4 °C)-98.1 °F (36.7 °C)] 97.8 °F (36.6 °C)  Pulse:  [58-68] 60  Resp:  [12-31] 16  SpO2:  [90 %-96 %] 95 %  BP: (144-168)/(63-77) 144/64     Weight: 94.8 kg (208 lb 15.9 oz)  Body mass index is 38.23 kg/m².    Physical Exam  Vitals signs and nursing note reviewed.   Constitutional:       General: She is not in acute distress.     Appearance: She is well-developed.   HENT:      Head:  Normocephalic and atraumatic.   Eyes:      Conjunctiva/sclera: Conjunctivae normal.      Pupils: Pupils are equal, round, and reactive to light.   Neck:      Musculoskeletal: Normal range of motion and neck supple.      Thyroid: No thyromegaly.   Cardiovascular:      Rate and Rhythm: Normal rate and regular rhythm.      Heart sounds: Normal heart sounds.   Pulmonary:      Effort: Pulmonary effort is normal. No respiratory distress.      Breath sounds: Normal breath sounds. No wheezing.   Abdominal:      General: Bowel sounds are normal.      Palpations: Abdomen is soft.      Tenderness: There is no abdominal tenderness.   Musculoskeletal: Normal range of motion.         General: Tenderness (lumbar) present.   Lymphadenopathy:      Cervical: No cervical adenopathy.   Skin:     General: Skin is warm and dry.      Findings: No rash.      Comments: No bruising noted to lumbar area   Neurological:      Mental Status: She is alert and oriented to person, place, and time.   Psychiatric:         Behavior: Behavior normal.           CRANIAL NERVES     CN III, IV, VI   Pupils are equal, round, and reactive to light.       Significant Labs:   A1C: No results for input(s): HGBA1C in the last 4320 hours.  Blood Culture: No results for input(s): LABBLOO in the last 48 hours.  CBC:   Recent Labs   Lab 08/06/20 1707 08/07/20  0520   WBC 7.18 8.98   HGB 11.6* 11.6*   HCT 36.2* 35.5*    222     CMP:   Recent Labs   Lab 08/06/20 1707 08/07/20  0520    137   K 4.4 4.0   CL 98 99   CO2 28 28   * 123*   BUN 24* 19   CREATININE 1.4 1.1   CALCIUM 9.2 9.1   PROT 7.2 6.9   ALBUMIN 3.2* 3.0*   BILITOT 0.4 0.5   ALKPHOS 81 71   AST 16 17   ALT 14 13   ANIONGAP 10 10   EGFRNONAA 33* 45*     Cardiac Markers:   Recent Labs   Lab 08/06/20  1707   BNP 74     Coagulation:   Recent Labs   Lab 08/06/20 1707   INR 1.0   APTT 25.5     Lactic Acid: No results for input(s): LACTATE in the last 48 hours.  Magnesium:   Recent Labs    Lab 08/06/20  1707   MG 1.9     Troponin:   Recent Labs   Lab 08/06/20  1707 08/06/20  2319 08/07/20  0520   TROPONINI <0.006 0.026 0.012     TSH:   Recent Labs   Lab 08/06/20  1707   TSH 1.326     Urine Culture: No results for input(s): LABURIN in the last 48 hours.  Urine Studies:   Recent Labs   Lab 08/06/20  1854   COLORU Yellow   APPEARANCEUA Clear   PHUR 7.0   SPECGRAV 1.025   PROTEINUA Negative   GLUCUA Negative   KETONESU Negative   BILIRUBINUA Negative   OCCULTUA Negative   NITRITE Negative   UROBILINOGEN Negative   LEUKOCYTESUR Negative     All pertinent labs within the past 24 hours have been reviewed.    Significant Imaging: I have reviewed all pertinent imaging results/findings within the past 24 hours.     CTA chest-8/6/2020   Negative CTA of the chest. No evidence of pulmonary embolism.     Slight haziness of the lungs bilaterally with areas of mosaic lung density at the bases.  Findings are nonspecific but could be related to air trapping    Xray pelvis-8/6/2020    No fracture or dislocation  2. There are mild osteoarthritic changes in the pubic symphysis.  3. There is a prominent amount of fecal material within the colon.  4. There is a moderate amount of atherosclerosis    CXR-8/6/2020    The size of the heart is prominent. There is a mild amount of interstitial opacities seen in both lungs with Kerley B-lines visualized bilaterally.  This is characteristic of pulmonary edema.    Xray spine-   1. There is grade 2 anterolisthesis of L4 on L5.  2. There are mild degenerative changes between T7 and L2.  3. There is a prominent amount of fecal material within the colon.  4. There is a moderate amount of atherosclerosis.  5. There are mild osteoarthritic changes in the pubic symphysis.     8/6/2020 CT head -   No acute findings.  2. Chronic lacunar infarct within the right basal ganglia with white matter patchy low-density of the likely relates to chronic microvascular ischemia

## 2020-08-07 NOTE — NURSING
Patient transferred from ED via wheel chair and 2L oxygen by ALEXUS Travis. Patient stable. Assumed care. Will continue to monitor.

## 2020-08-07 NOTE — ASSESSMENT & PLAN NOTE
Continue amlodipine 2.5mg  Lopressor/ toprol xl 25mg daily   Lisinopril 40mg   Takes aldactone/HCTZ 25/25 mg daily at home   Cards consulted recommends monitor for bradycardia etiology, does not feel she is having any acute heart failure     BP slightly elevated this AM.  Resume home meds.

## 2020-08-07 NOTE — PLAN OF CARE
Patient with c/o back pain. PT for eval & treat. Low grade temp 99. Tramadol prn pain. Up in chair most of day. Patient requires assist X1 to get in and out bed. Telemetry. Cardiology following. Agrees with plan of care.

## 2020-08-07 NOTE — ASSESSMENT & PLAN NOTE
Feels fine this am except for some back pain  Notes intermittent problems with balance due to hx of shingles to left face/ear; hearing loss   Denies syncope  Will continue to monitor , rule out bradycardia  Pt has son that lives with her.   Will ask PT to evaluate   toradol injection for back pain followed by ultram if no improvement.  Doesn't seem to be a danger at home alone. Will likely d/c if she can ambulate

## 2020-08-07 NOTE — ED NOTES
Patient resting comfortably. Resp even and unlabored. VSS NAD at present. Call light in reach. Instructed to call for needs. Verbalized understanding.      Neela Corado RN  08/06/20 2052

## 2020-08-07 NOTE — HPI
Alysia Ross is a 89 y.o. female with PMH of anxiety, depression, HTN, Hyperlipidemia, CVA who presented  to ER for evaluation after fall.  Patient reports that she was walking with walker when she lost her balance, fell, and hit her head. She presented with c/o  posterior head pain, contusion, lower back pain. Neuro exam WNL in ER She denied  loc;    She report chronic hx of lower back pain; worse after fall. +ttp L/S spine on exam; no evidence of cauda equina syndrome.  She denies neck pain, reports ambulatory after fall.   Initial 02 sats 86-87% RA; denies sob; + basilar rales on exam. Denies ho CHF/ oxygen use. Denies dyspnea. Takes dual diuretic at home  BNP 74, D- Dimer elevated 12. 89 ; CT of chest negative for PE   CXR-  mild amount of interstitial opacities seen in both lungs with Kerley B-lines visualized bilaterally  CT of head negative for acute findings

## 2020-08-07 NOTE — CONSULTS
[facility]  Cardiology  Consult Note    Patient Name: Alysia Ross  MRN: [id number]  Admission Date: 8/6/2020  Hospital Length of Stay: 0 days  Code Status: Full Code   Attending Provider: Neville Wills MD   Consulting Provider: Brittanie Davis NP  Primary Care Physician: Carl Aguayo MD  Principal Problem:<principal problem not specified>      Consults  Subjective:     Chief Complaint:  Fall    HPI: Pt is an 89 year old female with a past medical history of hypertension, hyperlipidemia, CVA who presented to the ER following a mechanical fall. She states that she lost balance while walking with her walker and hit her head. Without any CP, SOB or palpitations.  BP has been elevated since arrival though she is experiencing some pain. Labs pertinent for H/H 11/35, GFR 45, troponin negative x 2 sets.  CTA chest negative for PE. CT head with no acute findings. No fractures noted on lumbar spine/pelvis x ray. EKG reveals sinus bradycardia with HR 59 but no acute ischemic changes. CIS asked to evaluate.     Past Medical History:   Diagnosis Date    Anxiety     Depression     Hyperlipidemia     Hypertension     Stroke        Past Surgical History:   Procedure Laterality Date    APPENDECTOMY  1960    HYSTERECTOMY  1960s    KIDNEY STONE SURGERY  1950s       Review of patient's allergies indicates:   Allergen Reactions    Benicar [olmesartan]      Elevated bp    Penicillins      Other reaction(s): Unknown    Sulfa (sulfonamide antibiotics)      Other reaction(s): Unknown       No current facility-administered medications on file prior to encounter.      Current Outpatient Medications on File Prior to Encounter   Medication Sig    alendronate (FOSAMAX) 70 MG tablet Take 1 tablet (70 mg total) by mouth every 7 days.    amLODIPine (NORVASC) 2.5 MG tablet Take 1 tablet (2.5 mg total) by mouth once daily.    aspirin (ECOTRIN) 81 MG EC tablet Take 1 tablet by mouth Daily.     citalopram (CELEXA) 10 MG tablet Take 1 tablet (10 mg total) by mouth once daily.    clonazePAM (KLONOPIN) 0.5 MG tablet Take 1/2 (one-half) tablet by mouth once daily    lisinopriL (PRINIVIL,ZESTRIL) 40 MG tablet Take 1 tablet (40 mg total) by mouth once daily.    metoprolol succinate (TOPROL-XL) 25 MG 24 hr tablet Take 1 tablet (25 mg total) by mouth once daily.    omega-3 fatty acids 1,000 mg Cap Take 3 capsules by mouth Daily.    potassium chloride SA (K-DUR,KLOR-CON) 20 MEQ tablet Take 1 tablet (20 mEq total) by mouth once daily.    primidone (MYSOLINE) 50 MG Tab Take 1 tablet (50 mg total) by mouth 3 (three) times daily.    simvastatin (ZOCOR) 40 MG tablet Take 1 tablet (40 mg total) by mouth every evening.    spironolactone-hydrochlorothiazide 25-25mg (ALDACTAZIDE) 25-25 mg Tab Take 1 tablet by mouth once daily    collagenase (SANTYL) ointment Apply topically once daily.    traMADoL (ULTRAM) 50 mg tablet Take 1 tablet (50 mg total) by mouth every 6 (six) hours as needed. GREATER THAN 7 DAY QUANTITY MEDICALLY NECESSARY     Family History     Problem Relation (Age of Onset)    Arthritis Daughter    Cancer Brother, Son    Dementia Brother    Heart attack Father    Heart disease Father    Hypertension Daughter, Brother, Son, Daughter    Kidney disease Daughter    No Known Problems Mother    Parkinsonism Brother, Son    Restless legs syndrome Daughter    Thyroid disease Daughter        Tobacco Use    Smoking status: Never Smoker    Smokeless tobacco: Never Used   Substance and Sexual Activity    Alcohol use: No    Drug use: No    Sexual activity: Never     ROS     Constitutional : Negative  EENT : Negative  CV : Negative  Respiratory : Negative  Gastrointestinal: Negative   Genitourinary: Negative  Musculoskeletal: Back and head pain  Skin : Negative  Neurological : AAO x 3     Objective:     Vital Signs (Most Recent):  Temp: 97.5 °F (36.4 °C) (08/07/20 0802)  Pulse: (!) 59 (08/07/20 0802)  Resp: 19  (08/07/20 0802)  BP: (!) 184/81 (08/07/20 0802)  SpO2: 96 % (08/07/20 0802) Vital Signs (24h Range):  Temp:  [97.5 °F (36.4 °C)-98.1 °F (36.7 °C)] 97.5 °F (36.4 °C)  Pulse:  [58-68] 59  Resp:  [12-31] 19  SpO2:  [90 %-96 %] 96 %  BP: (144-184)/(63-81) 184/81     Weight: 94.8 kg (208 lb 15.9 oz)  Body mass index is 38.23 kg/m².    SpO2: 96 %  O2 Device (Oxygen Therapy): nasal cannula    No intake or output data in the 24 hours ending 08/07/20 0813    Lines/Drains/Airways     Peripheral Intravenous Line                 Peripheral IV - Single Lumen 08/06/20 1830 20 G Antecubital less than 1 day                Physical Exam     General appearance: alert, appears stated age and cooperative  Head: Normocephalic, without obvious abnormality, atraumatic  Eyes: conjunctivae/corneas clear. PERRL  Neck: no carotid bruit, no JVD and supple, symmetrical, trachea midline  Lungs: diminished to bilateral bases  Chest Wall: no tenderness  Heart: regular rate and rhythm, S1, S2 normal, no murmur, click, rub or gallop  Abdomen: soft, non-tender; bowel sounds normal; no masses,  no organomegaly  Extremities: Diminished ROM to lower back, tenderness  Pulses: Dorsalis Pedis R: 2+ (normal)/L: 2+ (normal)  Skin: Skin color, texture, turgor normal. No rashes or lesions  Neurologic: Normal mood and affect  Alert and oriented X 3    Significant Labs:   BMP:   Recent Labs   Lab 08/06/20 1707 08/07/20  0520   * 123*    137   K 4.4 4.0   CL 98 99   CO2 28 28   BUN 24* 19   CREATININE 1.4 1.1   CALCIUM 9.2 9.1   MG 1.9  --    , CMP   Recent Labs   Lab 08/06/20  1707 08/07/20  0520    137   K 4.4 4.0   CL 98 99   CO2 28 28   * 123*   BUN 24* 19   CREATININE 1.4 1.1   CALCIUM 9.2 9.1   PROT 7.2 6.9   ALBUMIN 3.2* 3.0*   BILITOT 0.4 0.5   ALKPHOS 81 71   AST 16 17   ALT 14 13   ANIONGAP 10 10   ESTGFRAFRICA 38* 51*   EGFRNONAA 33* 45*   , CBC   Recent Labs   Lab 08/06/20  1707 08/07/20  0520   WBC 7.18 8.98   HGB 11.6*  11.6*   HCT 36.2* 35.5*    222    and Troponin   Recent Labs   Lab 08/06/20  1707 08/06/20  2319 08/07/20  0520   TROPONINI <0.006 0.026 0.012       Assessment and Plan:     Active Diagnoses:    Diagnosis Date Noted POA    Fall [W19.XXXA] 08/06/2020 Yes    Tremor [R25.1] 01/24/2013 Yes    Depression [F32.9] 12/19/2012 Yes    Osteoporosis [M81.0] 12/19/2012 Yes    Hyperlipidemia [E78.5]  Yes    HTN (hypertension) [I10] 07/06/2012 Yes      Problems Resolved During this Admission:       VTE Risk Mitigation (From admission, onward)         Ordered     IP VTE HIGH RISK PATIENT  Once      08/06/20 2124     Place sequential compression device  Until discontinued      08/06/20 2124              Current Facility-Administered Medications   Medication    acetaminophen tablet 650 mg    albuterol-ipratropium 2.5 mg-0.5 mg/3 mL nebulizer solution 3 mL    amLODIPine tablet 2.5 mg    citalopram tablet 10 mg    clonazePAM tablet 0.5 mg    lisinopriL tablet 40 mg    metoprolol succinate (TOPROL-XL) 24 hr tablet 25 mg    ondansetron injection 4 mg    potassium chloride SA CR tablet 20 mEq    primidone tablet 50 mg    promethazine (PHENERGAN) 12.5 mg in dextrose 5 % 50 mL IVPB    simvastatin tablet 40 mg    sodium chloride 0.9% flush 10 mL       Dx:     Mechanical fall with with gait problems following CVA and uses walker    Hypertension uncontrolled LVEF 60% 2016; HM OK, No ischemia by DSE 2014.  No PE by CT  Hyperlipidemia    Hx CVA with residual deficits    Transient desat likely due to atelectasis    Plan: mobilize with PT  Continue aldactone, amlodipine, HCTZ, lisinopril          Brittanie Davis NP for Dr. Coronado  Cardiology   [facility]    I attest that I have personally seen and examined this patient. I have reviewed and discussed the management in detail as outlined above.

## 2020-08-07 NOTE — H&P
[facility] Hospital Medicine  History & Physical    Patient Name: Alysia Ross  MRN: [id number]  Admission Date: 8/6/2020  Attending Physician: Neville Wills MD   Primary Care Provider: Carl Aguayo MD         Patient information was obtained from patient and ER records.     Subjective:     Principal Problem:<principal problem not specified>    Chief Complaint:   Chief Complaint   Patient presents with    Fall        HPI: Alysia Ross is a 89 y.o. female with PMH of anxiety, depression, HTN, Hyperlipidemia, CVA who presented  to ER for evaluation after fall.  Patient reports that she was walking with walker when she lost her balance, fell, and hit her head. She presented with c/o  posterior head pain, contusion, lower back pain. Neuro exam WNL in ER She denied  loc;    She report chronic hx of lower back pain; worse after fall. +ttp L/S spine on exam; no evidence of cauda equina syndrome.  She denies neck pain, reports ambulatory after fall.   Initial 02 sats 86-87% RA; denies sob; + basilar rales on exam. Denies ho CHF/ oxygen use. Denies dyspnea. Takes dual diuretic at home  BNP 74, D- Dimer elevated 12. 89 ; CT of chest negative for PE   CXR-  mild amount of interstitial opacities seen in both lungs with Kerley B-lines visualized bilaterally  CT of head negative for acute findings        Past Medical History:   Diagnosis Date    Anxiety     Depression     Hyperlipidemia     Hypertension     Stroke        Past Surgical History:   Procedure Laterality Date    APPENDECTOMY  1960    HYSTERECTOMY  1960s    KIDNEY STONE SURGERY  1950s       Review of patient's allergies indicates:   Allergen Reactions    Benicar [olmesartan]      Elevated bp    Penicillins      Other reaction(s): Unknown    Sulfa (sulfonamide antibiotics)      Other reaction(s): Unknown       No current facility-administered medications on file prior to encounter.      Current Outpatient  Medications on File Prior to Encounter   Medication Sig    alendronate (FOSAMAX) 70 MG tablet Take 1 tablet (70 mg total) by mouth every 7 days.    amLODIPine (NORVASC) 2.5 MG tablet Take 1 tablet (2.5 mg total) by mouth once daily.    aspirin (ECOTRIN) 81 MG EC tablet Take 1 tablet by mouth Daily.    citalopram (CELEXA) 10 MG tablet Take 1 tablet (10 mg total) by mouth once daily.    clonazePAM (KLONOPIN) 0.5 MG tablet Take 1/2 (one-half) tablet by mouth once daily    lisinopriL (PRINIVIL,ZESTRIL) 40 MG tablet Take 1 tablet (40 mg total) by mouth once daily.    metoprolol succinate (TOPROL-XL) 25 MG 24 hr tablet Take 1 tablet (25 mg total) by mouth once daily.    omega-3 fatty acids 1,000 mg Cap Take 3 capsules by mouth Daily.    potassium chloride SA (K-DUR,KLOR-CON) 20 MEQ tablet Take 1 tablet (20 mEq total) by mouth once daily.    primidone (MYSOLINE) 50 MG Tab Take 1 tablet (50 mg total) by mouth 3 (three) times daily.    simvastatin (ZOCOR) 40 MG tablet Take 1 tablet (40 mg total) by mouth every evening.    spironolactone-hydrochlorothiazide 25-25mg (ALDACTAZIDE) 25-25 mg Tab Take 1 tablet by mouth once daily    collagenase (SANTYL) ointment Apply topically once daily.    traMADoL (ULTRAM) 50 mg tablet Take 1 tablet (50 mg total) by mouth every 6 (six) hours as needed. GREATER THAN 7 DAY QUANTITY MEDICALLY NECESSARY     Family History     Problem Relation (Age of Onset)    Arthritis Daughter    Cancer Brother, Son    Dementia Brother    Heart attack Father    Heart disease Father    Hypertension Daughter, Brother, Son, Daughter    Kidney disease Daughter    No Known Problems Mother    Parkinsonism Brother, Son    Restless legs syndrome Daughter    Thyroid disease Daughter        Tobacco Use    Smoking status: Never Smoker    Smokeless tobacco: Never Used   Substance and Sexual Activity    Alcohol use: No    Drug use: No    Sexual activity: Never     Review of Systems   Constitutional:  Negative for chills, fatigue and fever.   HENT: Negative for congestion, ear pain, postnasal drip, sinus pressure, sneezing and sore throat.    Eyes: Negative for discharge.   Respiratory: Negative for cough, chest tightness and shortness of breath.    Cardiovascular: Negative.    Gastrointestinal: Negative for abdominal pain, constipation, diarrhea, nausea and vomiting.   Genitourinary: Negative for difficulty urinating, flank pain and hematuria.   Musculoskeletal: Positive for arthralgias and back pain. Negative for joint swelling.   Skin: Negative.    Neurological: Negative for dizziness and headaches.        Recurrent falls after shingles   Psychiatric/Behavioral: Negative for behavioral problems and confusion.     Objective:     Vital Signs (Most Recent):  Temp: 97.8 °F (36.6 °C) (08/07/20 0436)  Pulse: 60 (08/07/20 0600)  Resp: 16 (08/07/20 0436)  BP: (!) 144/64 (08/07/20 0436)  SpO2: 95 % (08/07/20 0558) Vital Signs (24h Range):  Temp:  [97.6 °F (36.4 °C)-98.1 °F (36.7 °C)] 97.8 °F (36.6 °C)  Pulse:  [58-68] 60  Resp:  [12-31] 16  SpO2:  [90 %-96 %] 95 %  BP: (144-168)/(63-77) 144/64     Weight: 94.8 kg (208 lb 15.9 oz)  Body mass index is 38.23 kg/m².    Physical Exam  Vitals signs and nursing note reviewed.   Constitutional:       General: She is not in acute distress.     Appearance: She is well-developed.   HENT:      Head: Normocephalic and atraumatic.   Eyes:      Conjunctiva/sclera: Conjunctivae normal.      Pupils: Pupils are equal, round, and reactive to light.   Neck:      Musculoskeletal: Normal range of motion and neck supple.      Thyroid: No thyromegaly.   Cardiovascular:      Rate and Rhythm: Normal rate and regular rhythm.      Heart sounds: Normal heart sounds.   Pulmonary:      Effort: Pulmonary effort is normal. No respiratory distress.      Breath sounds: Normal breath sounds. No wheezing.   Abdominal:      General: Bowel sounds are normal.      Palpations: Abdomen is soft.       Tenderness: There is no abdominal tenderness.   Musculoskeletal: Normal range of motion.         General: Tenderness (lumbar) present.   Lymphadenopathy:      Cervical: No cervical adenopathy.   Skin:     General: Skin is warm and dry.      Findings: No rash.      Comments: No bruising noted to lumbar area   Neurological:      Mental Status: She is alert and oriented to person, place, and time.   Psychiatric:         Behavior: Behavior normal.           CRANIAL NERVES     CN III, IV, VI   Pupils are equal, round, and reactive to light.       Significant Labs:   A1C: No results for input(s): HGBA1C in the last 4320 hours.  Blood Culture: No results for input(s): LABBLOO in the last 48 hours.  CBC:   Recent Labs   Lab 08/06/20 1707 08/07/20  0520   WBC 7.18 8.98   HGB 11.6* 11.6*   HCT 36.2* 35.5*    222     CMP:   Recent Labs   Lab 08/06/20 1707 08/07/20  0520    137   K 4.4 4.0   CL 98 99   CO2 28 28   * 123*   BUN 24* 19   CREATININE 1.4 1.1   CALCIUM 9.2 9.1   PROT 7.2 6.9   ALBUMIN 3.2* 3.0*   BILITOT 0.4 0.5   ALKPHOS 81 71   AST 16 17   ALT 14 13   ANIONGAP 10 10   EGFRNONAA 33* 45*     Cardiac Markers:   Recent Labs   Lab 08/06/20  1707   BNP 74     Coagulation:   Recent Labs   Lab 08/06/20  1707   INR 1.0   APTT 25.5     Lactic Acid: No results for input(s): LACTATE in the last 48 hours.  Magnesium:   Recent Labs   Lab 08/06/20  1707   MG 1.9     Troponin:   Recent Labs   Lab 08/06/20  1707 08/06/20  2319 08/07/20  0520   TROPONINI <0.006 0.026 0.012     TSH:   Recent Labs   Lab 08/06/20  1707   TSH 1.326     Urine Culture: No results for input(s): LABURIN in the last 48 hours.  Urine Studies:   Recent Labs   Lab 08/06/20  1854   COLORU Yellow   APPEARANCEUA Clear   PHUR 7.0   SPECGRAV 1.025   PROTEINUA Negative   GLUCUA Negative   KETONESU Negative   BILIRUBINUA Negative   OCCULTUA Negative   NITRITE Negative   UROBILINOGEN Negative   LEUKOCYTESUR Negative     All pertinent labs within  the past 24 hours have been reviewed.    Significant Imaging: I have reviewed all pertinent imaging results/findings within the past 24 hours.     CTA chest-8/6/2020   Negative CTA of the chest. No evidence of pulmonary embolism.     Slight haziness of the lungs bilaterally with areas of mosaic lung density at the bases.  Findings are nonspecific but could be related to air trapping    Xray pelvis-8/6/2020    No fracture or dislocation  2. There are mild osteoarthritic changes in the pubic symphysis.  3. There is a prominent amount of fecal material within the colon.  4. There is a moderate amount of atherosclerosis    CXR-8/6/2020    The size of the heart is prominent. There is a mild amount of interstitial opacities seen in both lungs with Kerley B-lines visualized bilaterally.  This is characteristic of pulmonary edema.    Xray spine-   1. There is grade 2 anterolisthesis of L4 on L5.  2. There are mild degenerative changes between T7 and L2.  3. There is a prominent amount of fecal material within the colon.  4. There is a moderate amount of atherosclerosis.  5. There are mild osteoarthritic changes in the pubic symphysis.     8/6/2020 CT head -   No acute findings.  2. Chronic lacunar infarct within the right basal ganglia with white matter patchy low-density of the likely relates to chronic microvascular ischemia    Assessment/Plan:     Hypoxia  Noted sats in 80s initially   Will taper oxygen  CT negative for PE  No evidence of CHF   Cards consulted       Fall  Feels fine this am except for some back pain  Notes intermittent problems with balance due to hx of shingles to left face/ear; hearing loss   Denies syncope  Will continue to monitor , rule out bradycardia  Pt has son that lives with her.   Will ask PT to evaluate   toradol injection for back pain followed by ultram if no improvement.  Doesn't seem to be a danger at home alone. Will likely d/c if she can ambulate        Tremor  Primidone        Depression  Continue celexa, prn clonopin       Osteoporosis  Continue weekly fosamax       Hyperlipidemia  Continue statin       HTN (hypertension)  Continue amlodipine 2.5mg  Lopressor/ toprol xl 25mg daily   Lisinopril 40mg   Takes aldactone/HCTZ 25/25 mg daily at home   Cards consulted recommends monitor for bradycardia etiology, does not feel she is having any acute heart failure     BP slightly elevated this AM.  Resume home meds.        VTE Risk Mitigation (From admission, onward)         Ordered     IP VTE HIGH RISK PATIENT  Once      08/06/20 2124     Place sequential compression device  Until discontinued      08/06/20 2124                   Mercedes Weiss MD  Department of Hospital Medicine   [facility]

## 2020-08-07 NOTE — PLAN OF CARE
08/07/20 1530   Discharge Assessment   Assessment Type Discharge Planning Assessment   Confirmed/corrected address and phone number on facesheet? Yes   Assessment information obtained from? Patient   Expected Length of Stay (days) 2   Communicated expected length of stay with patient/caregiver yes   Prior to hospitilization cognitive status: Alert/Oriented   Prior to hospitalization functional status: Assistive Equipment  (rolling walker)   Current cognitive status: Alert/Oriented   Current Functional Status: Assistive Equipment   Facility Arrived From: home   Lives With alone   Able to Return to Prior Arrangements yes   Is patient able to care for self after discharge? Yes   Who are your caregiver(s) and their phone number(s)? oral [phone]   Patient's perception of discharge disposition home or selfcare   Readmission Within the Last 30 Days no previous admission in last 30 days   Patient currently being followed by outpatient case management? No   Patient currently receives any other outside agency services? No   Equipment Currently Used at Home walker, rolling;other (see comments)  (life alert)   Do you have any problems affording any of your prescribed medications? No   Is the patient taking medications as prescribed? yes   Does the patient have transportation home? Yes   Transportation Anticipated family or friend will provide   Does the patient receive services at the [facility]? No   Discharge Plan A Home   Discharge Plan B Home Health   DME Needed Upon Discharge  none   Patient/Family in Agreement with Plan yes     Ms Saenz is here post falling and hitting her head on the floor. She denies any complaints at present. Post acute needs TBD. CM contact information and discharge brochure checklist reviewed with patient and all questions answered. Compliance and understanding voiced.

## 2020-08-07 NOTE — PLAN OF CARE
08/07/20 1508   Advance Directives (For Healthcare)   Advance Directive  (If Adv Dir status is received, view document under Adv Dir in header or Chart Review Media tab) Patient does not have Advance Directive, declines information.     Patient confirms she is a full code.

## 2020-08-07 NOTE — ASSESSMENT & PLAN NOTE
Noted sats in 80s initially   Will taper oxygen  CT negative for PE  No evidence of CHF   Cards consulted

## 2020-08-07 NOTE — PT/OT/SLP EVAL
"Physical Therapy Evaluation    Patient Name:  Alysia Ross   MRN:  [id number]    Recommendations:     Discharge Recommendations:      Discharge Equipment Recommendations: none   Barriers to discharge: Decreased caregiver support    Assessment:     Alysia Ross is a 89 y.o. female admitted with a medical diagnosis of <principal problem not specified>.  She presents with the following impairments/functional limitations:  weakness, pain, impaired self care skills, impaired functional mobilty, impaired endurance Patient had falls twice within this week and the last fall slipped off from the rollator while sitting on the bench that causes inc pain at chronic lower back pain (Arthritis). X rays results dated 8/6/20 no fracture. pateitn will benefit from skilled PT tx to inc safety, increase independence with mobility and to return to prior level of functions.    Rehab Prognosis: Fair; patient would benefit from acute skilled PT services to address these deficits and reach maximum level of function.    Recent Surgery: * No surgery found *      Plan:     During this hospitalization, patient to be seen   to address the identified rehab impairments via gait training, therapeutic activities, therapeutic exercises and progress toward the following goals:    · Plan of Care Expires:  08/13/20    Subjective     Chief Complaint: lower back pain  Patient/Family Comments/goals: 'to get better and to return home".  Pain/Comfort:  · Pain Rating 1: 10/10  · Location - Side 1: Bilateral  · Location - Orientation 1: lower  · Location 1: back  · Pain Addressed 1: Nurse notified, Reposition, Distraction  · Pain Rating Post-Intervention 1: 5/10    Patients cultural, spiritual, Gnosticism conflicts given the current situation: no    Living Environment:  Patient lives alone in a trailer with ramp  and handrails access to enter.  Prior to admission, patients level of function was Independent with gait functions using rollator, gets " meals on wheels and independent with self care activities.  Equipment used at home: walker, rolling, cane, quad.  DME owned (not currently used): quad cane.  Upon discharge, patient will have assistance from son from [place] whose temporarily staying with her.    Objective:     Communicated with patient prior to session.  Patient found supine with peripheral IV, telemetry, oxygen  upon PT entry to room.    General Precautions: Standard, fall   Orthopedic Precautions:N/A   Braces: N/A     Exams:  · Gross Motor Coordination:  WFL  · Postural Exam:  Patient presented with the following abnormalities:    · -       Rounded shoulders  · -       Forward head  · Skin Integrity/Edema:      · -       Skin integrity: Visible skin intact  · RUE ROM: WFL  · RUE Strength: WFL  · LUE ROM: WFL  · LUE Strength: WFL  · RLE ROM: WFL  · RLE Strength: WFL  · LLE ROM: WFL  · LLE Strength: WFL    Functional Mobility:  · Bed Mobility:     · Rolling Left:  contact guard assistance  · Rolling Right: contact guard assistance  · Scooting: contact guard assistance  · Supine to Sit: contact guard assistance  · Sit to Supine: contact guard assistance  · Transfers:     · Sit to Stand:  stand by assistance with rolling walker  · Bed to Chair: stand by assistance with  rolling walker  using  Step Transfer  · Gait: Standby Assistance x ~60 feet with RW. Slow pace and guarding back.  · Balance: Standing with RW Static: Good-; Dynamic: Good-    Therapeutic Activities and Exercises:   Completed Physical Therapy evaluation. Educated patient with body mechanics on bed mobility, body positioning, out of bed activity and transfers for back pain mgt and safety. Initiated gait trng with RW    AM-PAC 6 CLICK MOBILITY  Total Score:17     Patient left up in chair with all lines intact, call button in reach and nursing notified.    GOALS:   Multidisciplinary Problems     Physical Therapy Goals        Problem: Physical Therapy Goal    Goal Priority Disciplines  Outcome Goal Variances Interventions   Physical Therapy Goal     PT, PT/OT                      History:     Past Medical History:   Diagnosis Date    Anxiety     Depression     Hyperlipidemia     Hypertension     Stroke        Past Surgical History:   Procedure Laterality Date    APPENDECTOMY  1960    HYSTERECTOMY  1960s    KIDNEY STONE SURGERY  1950s       Time Tracking:     PT Received On: 08/07/20  PT Start Time: 1225     PT Stop Time: 1300  PT Total Time (min): 35 min     Billable Minutes: Evaluation 15 and Gait Training 15      Farzad Reese, PT  08/07/2020

## 2020-08-07 NOTE — PLAN OF CARE
Problem: Fall Injury Risk  Goal: Absence of Fall and Fall-Related Injury  Outcome: Ongoing, Progressing     Problem: Adult Inpatient Plan of Care  Goal: Plan of Care Review  Outcome: Ongoing, Progressing  Goal: Patient-Specific Goal (Individualization)  Outcome: Ongoing, Progressing  Goal: Absence of Hospital-Acquired Illness or Injury  Outcome: Ongoing, Progressing  Goal: Optimal Comfort and Wellbeing  Outcome: Ongoing, Progressing  Goal: Readiness for Transition of Care  Outcome: Ongoing, Progressing  Goal: Rounds/Family Conference  Outcome: Ongoing, Progressing     Problem: Skin Injury Risk Increased  Goal: Skin Health and Integrity  Outcome: Ongoing, Progressing     Problem: Gas Exchange Impaired  Goal: Optimal Gas Exchange  Outcome: Ongoing, Progressing     Patient fell at home. Contusion to the posterior of head. Skin tears to right arm. Admitted for pulmonology and cardiology consult for hypoxia in ER. COVID negative. Denies SOB and cough. Complains of chronic lower back pain. Heat compress given. Up to bedside commode. Unsteady gate.

## 2020-08-07 NOTE — PLAN OF CARE
08/07/20 1529   MENDEZ Message   Medicare Outpatient and Observation Notification regarding financial responsibility Given to patient/caregiver;Explained to patient/caregiver;Signed/date by patient/caregiver   Date MENDEZ was signed 08/07/20   Time MENDEZ was signed 1000

## 2020-08-08 VITALS
HEART RATE: 73 BPM | RESPIRATION RATE: 19 BRPM | DIASTOLIC BLOOD PRESSURE: 62 MMHG | TEMPERATURE: 98 F | OXYGEN SATURATION: 93 % | WEIGHT: 209 LBS | SYSTOLIC BLOOD PRESSURE: 142 MMHG | HEIGHT: 62 IN | BODY MASS INDEX: 38.46 KG/M2

## 2020-08-08 LAB
ANION GAP SERPL CALC-SCNC: 11 MMOL/L (ref 8–16)
BUN SERPL-MCNC: 21 MG/DL (ref 8–23)
CALCIUM SERPL-MCNC: 9.2 MG/DL (ref 8.7–10.5)
CHLORIDE SERPL-SCNC: 98 MMOL/L (ref 95–110)
CO2 SERPL-SCNC: 28 MMOL/L (ref 23–29)
CREAT SERPL-MCNC: 1.1 MG/DL (ref 0.5–1.4)
EST. GFR  (AFRICAN AMERICAN): 51 ML/MIN/1.73 M^2
EST. GFR  (NON AFRICAN AMERICAN): 45 ML/MIN/1.73 M^2
GLUCOSE SERPL-MCNC: 111 MG/DL (ref 70–110)
POTASSIUM SERPL-SCNC: 4.3 MMOL/L (ref 3.5–5.1)
SODIUM SERPL-SCNC: 137 MMOL/L (ref 136–145)

## 2020-08-08 PROCEDURE — 25000003 PHARM REV CODE 250: Performed by: FAMILY MEDICINE

## 2020-08-08 PROCEDURE — 99217 PR OBSERVATION CARE DISCHARGE: CPT | Mod: ,,, | Performed by: FAMILY MEDICINE

## 2020-08-08 PROCEDURE — 99217 PR OBSERVATION CARE DISCHARGE: ICD-10-PCS | Mod: ,,, | Performed by: FAMILY MEDICINE

## 2020-08-08 PROCEDURE — 94760 N-INVAS EAR/PLS OXIMETRY 1: CPT

## 2020-08-08 PROCEDURE — 36415 COLL VENOUS BLD VENIPUNCTURE: CPT

## 2020-08-08 PROCEDURE — 25000003 PHARM REV CODE 250: Performed by: NURSE PRACTITIONER

## 2020-08-08 PROCEDURE — 80048 BASIC METABOLIC PNL TOTAL CA: CPT

## 2020-08-08 PROCEDURE — G0378 HOSPITAL OBSERVATION PER HR: HCPCS

## 2020-08-08 RX ORDER — TRAMADOL HYDROCHLORIDE 50 MG/1
50 TABLET ORAL EVERY 6 HOURS PRN
Qty: 28 TABLET | Refills: 0 | Status: SHIPPED | OUTPATIENT
Start: 2020-08-08 | End: 2021-06-22

## 2020-08-08 RX ADMIN — POTASSIUM CHLORIDE 20 MEQ: 1500 TABLET, EXTENDED RELEASE ORAL at 08:08

## 2020-08-08 RX ADMIN — PRIMIDONE 50 MG: 50 TABLET ORAL at 08:08

## 2020-08-08 RX ADMIN — AMLODIPINE BESYLATE 2.5 MG: 2.5 TABLET ORAL at 08:08

## 2020-08-08 RX ADMIN — CITALOPRAM HYDROBROMIDE 10 MG: 10 TABLET ORAL at 08:08

## 2020-08-08 RX ADMIN — TRAMADOL HYDROCHLORIDE 50 MG: 50 TABLET, FILM COATED ORAL at 08:08

## 2020-08-08 RX ADMIN — HYDROCHLOROTHIAZIDE 25 MG: 25 TABLET ORAL at 08:08

## 2020-08-08 RX ADMIN — SPIRONOLACTONE 25 MG: 25 TABLET ORAL at 08:08

## 2020-08-08 RX ADMIN — LISINOPRIL 40 MG: 40 TABLET ORAL at 08:08

## 2020-08-08 RX ADMIN — METOPROLOL SUCCINATE 25 MG: 25 TABLET, EXTENDED RELEASE ORAL at 08:08

## 2020-08-08 NOTE — DISCHARGE INSTRUCTIONS
Walker Use    A walker with 4 solid prongs on the bottom (no wheels) will be the most stable. It lets you take some of the weight off your legs as you walk. To use it, though, you have to be able to lift it off the ground to move it forward. Other walkers may have 2 wheels in front (see picture), or 4 wheels. A walker with wheels allows you to push it forward without lifting it off the ground.  Walker height  Adjust your walker so that the top of the walker (where you hold on) is level with the crease in your wrist when you stand up straight. With your hands on the walker, your elbow should bend about 20 degrees.  Walker use  The following are general use guidelines:  · First, put your walker about one step ahead of you.  · Make sure that all 4 prongs or wheels are on the ground before taking a step forward or backward.  · Look where you are going, not at your feet.  ·  the top of the walker with both hands for support.  · Lift your injured or weakest leg and step into the walker frame. Dont step all the way to the front bar of your walker.  · Complete your step by bringing your good leg forward.  · Take small steps when you turn.  · To sit, back up until your legs touch the chair. Reach back to feel the seat before you sit.  · To get up from a chair, place the walker in front of you. Push yourself up by pressing down on the arms of the chair. As you shift your weight onto your feet, move one hand at a time to your walker. Do not use a walker to climb stairs or to use an escalator.  Safety tips  The following tips will help you safely use your walker:  · Make sure that all 4 prongs or wheels are on the ground before moving yourself forward or backward.  · In the home, remove scatter rugs, electrical cords, spills, and anything else that might cause you to fall.  · In the bathroom, use non-slip bath mats, grab bars, a raised toilet seat, and a shower tub seat.  · Use a backpack, monica pack, apron, or briefcase  to help you carry things around. To carry light-weight items, you may attach a small pack to the crossbar of the walker.  · Be sure your shoes fit properly, have non-slip bottoms, and are in good condition.  · Be cautious when going up and down curbs and walking on uneven sidewalks.  Date Last Reviewed: 10/1/2016  © 3821-1129 [employer]. [address] [place] [address], [place] [id number]. All rights reserved. This information is not intended as a substitute for professional medical care. Always follow your healthcare professional's instructions.        Preventing Falls: How to Prepare and What to Do    Falling is not something you want to think about. But it can make a big difference to plan ahead. If you're prepared, you'll know how to get help. And you'll be less likely to panic if you fall. This means you'll be able to do what's needed to get help right away.  How to prepare  · Have someone check on you daily.  · Keep a list of emergency numbers near the phone.  · Always have a way to call for help. Keep a cell phone with you at all times. Or talk with your healthcare provider about how to set up a home monitoring service. This involves wearing a small device around your neck or wrist. If you fall, you can press the button on the device. This alerts emergency responders.  · Talk with your healthcare provider about an exercise program that's right for you. Regular exercise may reduce the risk of falling and the risk for injury related to a fall.  · It's important to have good lighting in your home. Avoid using throw rugs, because they can raise your risk of tripping and falling. Add grab bars in the bathroom to help reduce the risk of falling. Small changes can make your home safer. Talk with your healthcare provider about making your home safer.  What to do if you fall  Above all, try to stay calm:  · If you start to fall, try to relax your body. This will reduce the impact of the fall.  · After you fall,  press your monitor button, or phone for help.  · Don't rush to get up. First, make sure you're not hurt.  · Roll onto your side, then crawl to a chair. Pull yourself up onto the chair slowly.  · You should be checked if you struck your head, lost consciousness, were confused afterward, or have any other concerns for injury.  · Be sure to tell your doctor that you fell.  A note to family and friends  If you're with a loved one when he or she starts to fall, don't try to stop the fall. Ease the person to the floor carefully, so neither of you gets hurt. Don't leave the person alone. And don't try to move him or her. Put a pillow under his or her head. Check for injuries. If help is needed right away, be sure to call 911.   Date Last Reviewed: 6/13/2015  © 6691-2176 [employer]. [address] [place] [address]. All rights reserved. This information is not intended as a substitute for professional medical care. Always follow your healthcare professional's instructions.      **Follow up with PCP in 24-48 hours. Return to ER with worsening of symptoms.     **Over the counter tylenol or motrin as needed for pain and/or fever as directed on package insert. Drink plenty fluids. Get plenty rest. Wash hands frequently.     **Our goal in the emergency department is to always give you outstanding care and exceptional service. You may receive a survey by mail or e-mail in the next week regarding your experience in our ED. We would greatly appreciate your completing and returning the survey. Your feedback provides us with a way to recognize our staff who give very good care and it helps us learn how to improve when your experience was below our aspiration of excellence.

## 2020-08-08 NOTE — ASSESSMENT & PLAN NOTE
Feels fine this am except for some back pain  Notes intermittent problems with balance due to hx of shingles to left face/ear; hearing loss   Denies syncope  Will continue to monitor , rule out bradycardia  Pt has son that lives with her.   Will ask PT to evaluate   toradol injection for back pain followed by ultram if no improvement.  Doesn't seem to be a danger at home alone. Will likely d/c if she can ambulate    Did well with PT. Pain improved during day. overngith she did well and will be d/c'd home today with home health.

## 2020-08-08 NOTE — PROGRESS NOTES
[facility] Hospital Medicine  Progress Note    Patient Name: Alysia Ross  MRN: [id number]  Patient Class: OP- Observation   Admission Date: 8/6/2020  Length of Stay: 0 days  Attending Physician: Neville Wills MD  Primary Care Provider: Carl Aguayo MD        Subjective:     Principal Problem:<principal problem not specified>        HPI:  Alysia Ross is a 89 y.o. female with PMH of anxiety, depression, HTN, Hyperlipidemia, CVA who presented  to ER for evaluation after fall.  Patient reports that she was walking with walker when she lost her balance, fell, and hit her head. She presented with c/o  posterior head pain, contusion, lower back pain. Neuro exam WNL in ER She denied  loc;    She report chronic hx of lower back pain; worse after fall. +ttp L/S spine on exam; no evidence of cauda equina syndrome.  She denies neck pain, reports ambulatory after fall.   Initial 02 sats 86-87% RA; denies sob; + basilar rales on exam. Denies ho CHF/ oxygen use. Denies dyspnea. Takes dual diuretic at home  BNP 74, D- Dimer elevated 12. 89 ; CT of chest negative for PE   CXR-  mild amount of interstitial opacities seen in both lungs with Kerley B-lines visualized bilaterally  CT of head negative for acute findings        Overview/Hospital Course:  Pain much improved with toradol then ultram.  Slept well.  No falls.  Sats drop with sleep but during day on RA > 95%.  Ready to go home.    Review of Systems   Constitutional: Negative for chills, fatigue and fever.   HENT: Negative for congestion, ear pain, postnasal drip, sinus pressure, sneezing and sore throat.    Eyes: Negative for discharge.   Respiratory: Negative for cough, chest tightness and shortness of breath.    Cardiovascular: Negative.    Gastrointestinal: Negative for abdominal pain, constipation, diarrhea, nausea and vomiting.   Genitourinary: Negative for difficulty urinating, flank pain and hematuria.   Musculoskeletal:  Positive for arthralgias and back pain. Negative for joint swelling.   Skin: Negative.    Neurological: Negative for dizziness and headaches.        Recurrent falls after shingles   Psychiatric/Behavioral: Negative for behavioral problems and confusion.     Objective:     Vital Signs (Most Recent):  Temp: 97.8 °F (36.6 °C) (08/08/20 0730)  Pulse: 73 (08/08/20 1011)  Resp: 19 (08/08/20 0829)  BP: (!) 142/62 (08/08/20 0730)  SpO2: (!) 93 % (08/08/20 0900) Vital Signs (24h Range):  Temp:  [97 °F (36.1 °C)-99 °F (37.2 °C)] 97.8 °F (36.6 °C)  Pulse:  [55-81] 73  Resp:  [17-19] 19  SpO2:  [91 %-93 %] 93 %  BP: (124-180)/(58-84) 142/62     Weight: 94.8 kg (208 lb 15.9 oz)  Body mass index is 38.23 kg/m².    Physical Exam  Vitals signs and nursing note reviewed.   Constitutional:       General: She is not in acute distress.     Appearance: She is well-developed.   HENT:      Head: Normocephalic and atraumatic.   Eyes:      Conjunctiva/sclera: Conjunctivae normal.      Pupils: Pupils are equal, round, and reactive to light.   Neck:      Musculoskeletal: Normal range of motion and neck supple.      Thyroid: No thyromegaly.   Cardiovascular:      Rate and Rhythm: Normal rate and regular rhythm.      Heart sounds: Normal heart sounds.   Pulmonary:      Effort: Pulmonary effort is normal. No respiratory distress.      Breath sounds: Normal breath sounds. No wheezing.   Abdominal:      General: Bowel sounds are normal.      Palpations: Abdomen is soft.      Tenderness: There is no abdominal tenderness.   Musculoskeletal: Normal range of motion.         General: Tenderness (lumbar) present.   Lymphadenopathy:      Cervical: No cervical adenopathy.   Skin:     General: Skin is warm and dry.      Findings: No rash.      Comments: No bruising noted to lumbar area   Neurological:      Mental Status: She is alert and oriented to person, place, and time.   Psychiatric:         Behavior: Behavior normal.           CRANIAL NERVES     CN  III, IV, VI   Pupils are equal, round, and reactive to light.       Significant Labs:   A1C: No results for input(s): HGBA1C in the last 4320 hours.  Blood Culture: No results for input(s): LABBLOO in the last 48 hours.  CBC:   Recent Labs   Lab 08/06/20 1707 08/07/20  0520   WBC 7.18 8.98   HGB 11.6* 11.6*   HCT 36.2* 35.5*    222     CMP:   Recent Labs   Lab 08/06/20  1707 08/07/20  0520 08/08/20  0648    137 137   K 4.4 4.0 4.3   CL 98 99 98   CO2 28 28 28   * 123* 111*   BUN 24* 19 21   CREATININE 1.4 1.1 1.1   CALCIUM 9.2 9.1 9.2   PROT 7.2 6.9  --    ALBUMIN 3.2* 3.0*  --    BILITOT 0.4 0.5  --    ALKPHOS 81 71  --    AST 16 17  --    ALT 14 13  --    ANIONGAP 10 10 11   EGFRNONAA 33* 45* 45*     Cardiac Markers:   Recent Labs   Lab 08/06/20  1707   BNP 74     Coagulation:   Recent Labs   Lab 08/06/20  1707   INR 1.0   APTT 25.5     Lactic Acid: No results for input(s): LACTATE in the last 48 hours.  Magnesium:   Recent Labs   Lab 08/06/20  1707   MG 1.9     Troponin:   Recent Labs   Lab 08/06/20  2319 08/07/20  0520 08/07/20  1106   TROPONINI 0.026 0.012 0.015     TSH:   Recent Labs   Lab 08/06/20  1707   TSH 1.326     Urine Culture: No results for input(s): LABURIN in the last 48 hours.  Urine Studies:   Recent Labs   Lab 08/06/20  1854   COLORU Yellow   APPEARANCEUA Clear   PHUR 7.0   SPECGRAV 1.025   PROTEINUA Negative   GLUCUA Negative   KETONESU Negative   BILIRUBINUA Negative   OCCULTUA Negative   NITRITE Negative   UROBILINOGEN Negative   LEUKOCYTESUR Negative     All pertinent labs within the past 24 hours have been reviewed.    Significant Imaging: I have reviewed all pertinent imaging results/findings within the past 24 hours.     CTA chest-8/6/2020   Negative CTA of the chest. No evidence of pulmonary embolism.     Slight haziness of the lungs bilaterally with areas of mosaic lung density at the bases.  Findings are nonspecific but could be related to air trapping    Xray  pelvis-8/6/2020    No fracture or dislocation  2. There are mild osteoarthritic changes in the pubic symphysis.  3. There is a prominent amount of fecal material within the colon.  4. There is a moderate amount of atherosclerosis    CXR-8/6/2020    The size of the heart is prominent. There is a mild amount of interstitial opacities seen in both lungs with Kerley B-lines visualized bilaterally.  This is characteristic of pulmonary edema.    Xray spine-   1. There is grade 2 anterolisthesis of L4 on L5.  2. There are mild degenerative changes between T7 and L2.  3. There is a prominent amount of fecal material within the colon.  4. There is a moderate amount of atherosclerosis.  5. There are mild osteoarthritic changes in the pubic symphysis.     8/6/2020 CT head -   No acute findings.  2. Chronic lacunar infarct within the right basal ganglia with white matter patchy low-density of the likely relates to chronic microvascular ischemia      Assessment/Plan:      Hypoxia  Noted sats in 80s initially   Will taper oxygen  CT negative for PE  No evidence of CHF   Cards consulted       Fall  Feels fine this am except for some back pain  Notes intermittent problems with balance due to hx of shingles to left face/ear; hearing loss   Denies syncope  Will continue to monitor , rule out bradycardia  Pt has son that lives with her.   Will ask PT to evaluate   toradol injection for back pain followed by ultram if no improvement.  Doesn't seem to be a danger at home alone. Will likely d/c if she can ambulate    Did well with PT. Pain improved during day. overngith she did well and will be d/c'd home today with home health.        Tremor  Primidone       Depression  Continue celexa, prn clonopin       Osteoporosis  Continue weekly fosamax       Hyperlipidemia  Continue statin       HTN (hypertension)  Continue amlodipine 2.5mg  Lopressor/ toprol xl 25mg daily   Lisinopril 40mg   Takes aldactone/HCTZ 25/25 mg daily at home   Cards  consulted recommends monitor for bradycardia etiology, does not feel she is having any acute heart failure     BP slightly elevated this AM.  Resume home meds.        VTE Risk Mitigation (From admission, onward)         Ordered     IP VTE HIGH RISK PATIENT  Once      08/06/20 2124     Place sequential compression device  Until discontinued      08/06/20 2124                      Mercedes Weiss MD  Department of Hospital Medicine   [facility]

## 2020-08-08 NOTE — PLAN OF CARE
Patient meets criteria for discharge. Pain manageable with ultram prn. Patient ambulates safely with walker. Reviewed discharge education for using walker at home. Preventing falls in home. Patient free of falls and incidence this shift. Agrees with plan of care.

## 2020-08-08 NOTE — NURSING
Patient walker with assist X 1 to bathroom. Patient ambulated to chair. Call bell within reach. C/o pain to back. Pillow support provided.

## 2020-08-08 NOTE — HOSPITAL COURSE
Pain much improved with toradol then ultram.  Slept well.  No falls.  Sats drop with sleep but during day on RA > 95%.  Ready to go home.

## 2020-08-08 NOTE — SUBJECTIVE & OBJECTIVE
Review of Systems   Constitutional: Negative for chills, fatigue and fever.   HENT: Negative for congestion, ear pain, postnasal drip, sinus pressure, sneezing and sore throat.    Eyes: Negative for discharge.   Respiratory: Negative for cough, chest tightness and shortness of breath.    Cardiovascular: Negative.    Gastrointestinal: Negative for abdominal pain, constipation, diarrhea, nausea and vomiting.   Genitourinary: Negative for difficulty urinating, flank pain and hematuria.   Musculoskeletal: Positive for arthralgias and back pain. Negative for joint swelling.   Skin: Negative.    Neurological: Negative for dizziness and headaches.        Recurrent falls after shingles   Psychiatric/Behavioral: Negative for behavioral problems and confusion.     Objective:     Vital Signs (Most Recent):  Temp: 97.8 °F (36.6 °C) (08/08/20 0730)  Pulse: 73 (08/08/20 1011)  Resp: 19 (08/08/20 0829)  BP: (!) 142/62 (08/08/20 0730)  SpO2: (!) 93 % (08/08/20 0900) Vital Signs (24h Range):  Temp:  [97 °F (36.1 °C)-99 °F (37.2 °C)] 97.8 °F (36.6 °C)  Pulse:  [55-81] 73  Resp:  [17-19] 19  SpO2:  [91 %-93 %] 93 %  BP: (124-180)/(58-84) 142/62     Weight: 94.8 kg (208 lb 15.9 oz)  Body mass index is 38.23 kg/m².    Physical Exam  Vitals signs and nursing note reviewed.   Constitutional:       General: She is not in acute distress.     Appearance: She is well-developed.   HENT:      Head: Normocephalic and atraumatic.   Eyes:      Conjunctiva/sclera: Conjunctivae normal.      Pupils: Pupils are equal, round, and reactive to light.   Neck:      Musculoskeletal: Normal range of motion and neck supple.      Thyroid: No thyromegaly.   Cardiovascular:      Rate and Rhythm: Normal rate and regular rhythm.      Heart sounds: Normal heart sounds.   Pulmonary:      Effort: Pulmonary effort is normal. No respiratory distress.      Breath sounds: Normal breath sounds. No wheezing.   Abdominal:      General: Bowel sounds are normal.       Palpations: Abdomen is soft.      Tenderness: There is no abdominal tenderness.   Musculoskeletal: Normal range of motion.         General: Tenderness (lumbar) present.   Lymphadenopathy:      Cervical: No cervical adenopathy.   Skin:     General: Skin is warm and dry.      Findings: No rash.      Comments: No bruising noted to lumbar area   Neurological:      Mental Status: She is alert and oriented to person, place, and time.   Psychiatric:         Behavior: Behavior normal.           CRANIAL NERVES     CN III, IV, VI   Pupils are equal, round, and reactive to light.       Significant Labs:   A1C: No results for input(s): HGBA1C in the last 4320 hours.  Blood Culture: No results for input(s): LABBLOO in the last 48 hours.  CBC:   Recent Labs   Lab 08/06/20 1707 08/07/20 0520   WBC 7.18 8.98   HGB 11.6* 11.6*   HCT 36.2* 35.5*    222     CMP:   Recent Labs   Lab 08/06/20 1707 08/07/20 0520 08/08/20  0648    137 137   K 4.4 4.0 4.3   CL 98 99 98   CO2 28 28 28   * 123* 111*   BUN 24* 19 21   CREATININE 1.4 1.1 1.1   CALCIUM 9.2 9.1 9.2   PROT 7.2 6.9  --    ALBUMIN 3.2* 3.0*  --    BILITOT 0.4 0.5  --    ALKPHOS 81 71  --    AST 16 17  --    ALT 14 13  --    ANIONGAP 10 10 11   EGFRNONAA 33* 45* 45*     Cardiac Markers:   Recent Labs   Lab 08/06/20  1707   BNP 74     Coagulation:   Recent Labs   Lab 08/06/20  1707   INR 1.0   APTT 25.5     Lactic Acid: No results for input(s): LACTATE in the last 48 hours.  Magnesium:   Recent Labs   Lab 08/06/20  1707   MG 1.9     Troponin:   Recent Labs   Lab 08/06/20  2319 08/07/20 0520 08/07/20  1106   TROPONINI 0.026 0.012 0.015     TSH:   Recent Labs   Lab 08/06/20  1707   TSH 1.326     Urine Culture: No results for input(s): LABURIN in the last 48 hours.  Urine Studies:   Recent Labs   Lab 08/06/20  1854   COLORU Yellow   APPEARANCEUA Clear   PHUR 7.0   SPECGRAV 1.025   PROTEINUA Negative   GLUCUA Negative   KETONESU Negative   BILIRUBINUA Negative    OCCULTUA Negative   NITRITE Negative   UROBILINOGEN Negative   LEUKOCYTESUR Negative     All pertinent labs within the past 24 hours have been reviewed.    Significant Imaging: I have reviewed all pertinent imaging results/findings within the past 24 hours.     CTA chest-8/6/2020   Negative CTA of the chest. No evidence of pulmonary embolism.     Slight haziness of the lungs bilaterally with areas of mosaic lung density at the bases.  Findings are nonspecific but could be related to air trapping    Xray pelvis-8/6/2020    No fracture or dislocation  2. There are mild osteoarthritic changes in the pubic symphysis.  3. There is a prominent amount of fecal material within the colon.  4. There is a moderate amount of atherosclerosis    CXR-8/6/2020    The size of the heart is prominent. There is a mild amount of interstitial opacities seen in both lungs with Kerley B-lines visualized bilaterally.  This is characteristic of pulmonary edema.    Xray spine-   1. There is grade 2 anterolisthesis of L4 on L5.  2. There are mild degenerative changes between T7 and L2.  3. There is a prominent amount of fecal material within the colon.  4. There is a moderate amount of atherosclerosis.  5. There are mild osteoarthritic changes in the pubic symphysis.     8/6/2020 CT head -   No acute findings.  2. Chronic lacunar infarct within the right basal ganglia with white matter patchy low-density of the likely relates to chronic microvascular ischemia

## 2020-08-08 NOTE — DISCHARGE SUMMARY
[facility] Hospital Medicine  Discharge Summary      Patient Name: Alysia Ross  MRN: [id number]  Admission Date: 8/6/2020  Hospital Length of Stay: 0 days  Discharge Date and Time:  08/08/2020 10:37 AM  Attending Physician: Neville Wills MD   Discharging Provider: Mercedes Weiss MD  Primary Care Provider: Carl Aguayo MD      HPI:   Alysia Ross is a 89 y.o. female with PMH of anxiety, depression, HTN, Hyperlipidemia, CVA who presented  to ER for evaluation after fall.  Patient reports that she was walking with walker when she lost her balance, fell, and hit her head. She presented with c/o  posterior head pain, contusion, lower back pain. Neuro exam WNL in ER She denied  loc;    She report chronic hx of lower back pain; worse after fall. +ttp L/S spine on exam; no evidence of cauda equina syndrome.  She denies neck pain, reports ambulatory after fall.   Initial 02 sats 86-87% RA; denies sob; + basilar rales on exam. Denies ho CHF/ oxygen use. Denies dyspnea. Takes dual diuretic at home  BNP 74, D- Dimer elevated 12. 89 ; CT of chest negative for PE   CXR-  mild amount of interstitial opacities seen in both lungs with Kerley B-lines visualized bilaterally  CT of head negative for acute findings        * No surgery found *      Hospital Course:   Pain much improved with toradol then ultram.  Slept well.  No falls.  Sats drop with sleep but during day on RA > 95%.  Ready to go home.     Consults:   Consults (From admission, onward)        Status Ordering Provider     Inpatient consult to [facility]iology[facility]  Once     Provider:  Wiliam Coronado MD    Acknowledged NORMA HALL          * Fall  Feels fine this am except for some back pain  Notes intermittent problems with balance due to hx of shingles to left face/ear; hearing loss   Denies syncope  Will continue to monitor , rule out bradycardia  Pt has son that lives with her.   Will ask PT to evaluate   toradol injection  for back pain followed by ultram if no improvement.  Doesn't seem to be a danger at home alone. Will likely d/c if she can ambulate    Did well with PT. Pain improved during day. overngith she did well and will be d/c'd home today with home health.        Hypoxia  Noted sats in 80s initially   Will taper oxygen  CT negative for PE  No evidence of CHF   Cards consulted     Sats drop to 92-93 with sleep.  Could consider outpatient sleep study.    Tremor  Primidone       Depression  Continue celexa, prn Klonopin       Osteoporosis  Continue weekly fosamax       Hyperlipidemia  Continue statin       HTN (hypertension)  Continue amlodipine 2.5mg  Lopressor/ toprol xl 25mg daily   Lisinopril 40mg   Takes aldactone/HCTZ 25/25 mg daily at home   Cards consulted recommends monitor for bradycardia etiology, does not feel she is having any acute heart failure     BP slightly elevated this AM.  Resume home meds.        Final Active Diagnoses:    Diagnosis Date Noted POA    PRINCIPAL PROBLEM:  Fall [W19.XXXA] 08/06/2020 Yes    Hypoxia [R09.02] 08/07/2020 Yes    Tremor [R25.1] 01/24/2013 Yes    Depression [F32.9] 12/19/2012 Yes    Osteoporosis [M81.0] 12/19/2012 Yes    Hyperlipidemia [E78.5]  Yes    HTN (hypertension) [I10] 07/06/2012 Yes      Problems Resolved During this Admission:       Discharged Condition: good    Disposition: Home health    Follow Up:  Follow-up Information     Carl Aguayo MD. Go in 2 days.    Specialty: Family Medicine  Contact information:  [address]  [facility] [id number]  [phone]                 Patient Instructions:      Ambulatory referral/consult to Home Health   Standing Status: Future   Referral Priority: Routine Referral Type: Home Health Care   Referral Reason: Specialty Services Required   Requested Specialty: Home Health Services   Number of Visits Requested: 1       Significant Diagnostic Studies: None    Pending Diagnostic Studies:      None         Medications:  Reconciled Home Medications:      Medication List      CHANGE how you take these medications    * traMADoL 50 mg tablet  Commonly known as: ULTRAM  Take 1 tablet (50 mg total) by mouth every 6 (six) hours as needed. GREATER THAN 7 DAY QUANTITY MEDICALLY NECESSARY  What changed: Another medication with the same name was added. Make sure you understand how and when to take each.     * traMADoL 50 mg tablet  Commonly known as: ULTRAM  Take 1 tablet (50 mg total) by mouth every 6 (six) hours as needed.  What changed: You were already taking a medication with the same name, and this prescription was added. Make sure you understand how and when to take each.         * This list has 2 medication(s) that are the same as other medications prescribed for you. Read the directions carefully, and ask your doctor or other care provider to review them with you.            CONTINUE taking these medications    alendronate 70 MG tablet  Commonly known as: FOSAMAX  Take 1 tablet (70 mg total) by mouth every 7 days.     amLODIPine 2.5 MG tablet  Commonly known as: NORVASC  Take 1 tablet (2.5 mg total) by mouth once daily.     aspirin 81 MG EC tablet  Commonly known as: ECOTRIN  Take 1 tablet by mouth Daily.     citalopram 10 MG tablet  Commonly known as: CELEXA  Take 1 tablet (10 mg total) by mouth once daily.     clonazePAM 0.5 MG tablet  Commonly known as: KLONOPIN  Take 1/2 (one-half) tablet by mouth once daily     collagenase ointment  Commonly known as: SANTYL  Apply topically once daily.     lisinopriL 40 MG tablet  Commonly known as: PRINIVIL,ZESTRIL  Take 1 tablet (40 mg total) by mouth once daily.     metoprolol succinate 25 MG 24 hr tablet  Commonly known as: TOPROL-XL  Take 1 tablet (25 mg total) by mouth once daily.     omega-3 fatty acids 1,000 mg Cap  Take 3 capsules by mouth Daily.     potassium chloride SA 20 MEQ tablet  Commonly known as: K-DUR,KLOR-CON  Take 1 tablet (20 mEq total) by mouth  once daily.     primidone 50 MG Tab  Commonly known as: MYSOLINE  Take 1 tablet (50 mg total) by mouth 3 (three) times daily.     simvastatin 40 MG tablet  Commonly known as: ZOCOR  Take 1 tablet (40 mg total) by mouth every evening.     spironolactone-hydrochlorothiazide 25-25mg 25-25 mg Tab  Commonly known as: ALDACTAZIDE  Take 1 tablet by mouth once daily            Indwelling Lines/Drains at time of discharge:   Lines/Drains/Airways     None                 Time spent on the discharge of patient: 35 minutes  Patient was seen and examined on the date of discharge and determined to be suitable for discharge.         Mercedes Weiss MD  Department of Hospital Medicine  [facility]

## 2020-08-08 NOTE — PLAN OF CARE
MD recommends home health for patient. Post acute system list of Home care facilities provided to patient. Patient chose [facility] [place]. Choice form signed.     1240: Called [facility] and spoke with on call representative to obtain status of referral. Representative states she will send information to on call nurse, who should call me back.     1242: Received call back from Sivan, on call nurse with [facility]. She states she can not give me an answer on if patient was accepted or not, and the information was sent to intake team who will decide and notify me with decision.      08/08/20 1122   Discharge Reassessment   Assessment Type Discharge Planning Reassessment   Discharge Plan A Home Health   Patient choice form signed by patient/caregiver Yes   Anticipated Discharge Disposition Home-Health   Can the patient/caregiver answer the patient profile reliably? Yes, cognitively intact   Describe the patient's ability to walk at the present time. Minor restrictions or changes   Post-Acute Status   Post-Acute Authorization Home Health   Home Health Status Referrals Sent   Patient choice form signed by patient/caregiver List from System Post-Acute Care   Discharge Delays None known at this time

## 2020-08-08 NOTE — ASSESSMENT & PLAN NOTE
Noted sats in 80s initially   Will taper oxygen  CT negative for PE  No evidence of CHF   Cards consulted     Sats drop to 92-93 with sleep.  Could consider outpatient sleep study.

## 2020-08-08 NOTE — PLAN OF CARE
Plan of care discussed with patient.  Patient verbalizes understanding of plan of care.  Patient compliant with plan of care.  VSS, NADN  Patient remains free from falls or injury.  Patient denies nausea/vomiting or shortness of breath.  Will continue to monitor.

## 2020-08-09 PROCEDURE — G0180 MD CERTIFICATION HHA PATIENT: HCPCS | Mod: ,,, | Performed by: FAMILY MEDICINE

## 2020-08-09 PROCEDURE — G0180 PR HOME HEALTH MD CERTIFICATION: ICD-10-PCS | Mod: ,,, | Performed by: FAMILY MEDICINE

## 2020-08-09 NOTE — PLAN OF CARE
08/08/20 2046   Final Note   Assessment Type Final Discharge Note   Anticipated Discharge Disposition Home-Health   What phone number can be called within the next 1-3 days to see how you are doing after discharge? [phone]   Hospital Follow Up  Appt(s) scheduled?   (Staff nurse to do)   Discharge plans and expectations educations in teach back method with documentation complete? Yes   Right Care Referral Info   Post Acute Recommendation Home-care   Referral Type Home Care   Facility Name [facility] [place] [place]   Post-Acute Status   Post-Acute Authorization [facility]   Home Health Status Set-up Complete   Discharge Delays None known at this time       [facility] has accepted patient and will admit tomorrow, 8.9.2020. No other post acute needs identified for discharge.

## 2020-08-10 ENCOUNTER — OFFICE VISIT (OUTPATIENT)
Dept: FAMILY MEDICINE | Facility: CLINIC | Age: 85
End: 2020-08-10
Payer: MEDICARE

## 2020-08-10 VITALS
WEIGHT: 209 LBS | HEART RATE: 58 BPM | TEMPERATURE: 97 F | HEIGHT: 62 IN | BODY MASS INDEX: 38.46 KG/M2 | DIASTOLIC BLOOD PRESSURE: 62 MMHG | SYSTOLIC BLOOD PRESSURE: 108 MMHG

## 2020-08-10 DIAGNOSIS — Z09 HOSPITAL DISCHARGE FOLLOW-UP: Primary | ICD-10-CM
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
## 2020-08-10 DIAGNOSIS — M54.50 ACUTE RIGHT-SIDED LOW BACK PAIN WITHOUT SCIATICA: ICD-10-CM

## 2020-08-10 DIAGNOSIS — E87.6 HYPOKALEMIA: ICD-10-CM

## 2020-08-10 PROCEDURE — 99999 PR STA SHADOW: CPT | Mod: PBBFAC,,, | Performed by: FAMILY MEDICINE

## 2020-08-10 PROCEDURE — 99999 PR STA SHADOW: ICD-10-PCS | Mod: PBBFAC,,, | Performed by: FAMILY MEDICINE

## 2020-08-10 PROCEDURE — 99214 OFFICE O/P EST MOD 30 MIN: CPT | Mod: S$PBB | Performed by: FAMILY MEDICINE

## 2020-08-10 PROCEDURE — 99999 PR PBB SHADOW E&M-EST. PATIENT-LVL III: CPT | Mod: PBBFAC,,, | Performed by: FAMILY MEDICINE

## 2020-08-10 PROCEDURE — 99213 OFFICE O/P EST LOW 20 MIN: CPT | Mod: PBBFAC | Performed by: FAMILY MEDICINE

## 2020-08-10 NOTE — PROGRESS NOTES
Subjective:       Patient ID: Alysia Ross is a 89 y.o. female.    Chief Complaint: Hospital Follow Up (discharged saturday) and Back Pain (lower spine 7/10)    Pt is a 89 y.o. female who presents for evaluation and management of   Encounter Diagnoses   Name Primary?    Hospital discharge follow-up Yes    Acute right-sided low back pain without sciatica     Fall, initial encounter    .  Doing well on current meds. Denies any side effects. Prevention is up to date.    Review of Systems   Neurological: Negative for dizziness and light-headedness.   Psychiatric/Behavioral: Negative for confusion and decreased concentration.       Objective:      Physical Exam  Constitutional:       Appearance: She is well-developed.   HENT:      Head: Normocephalic and atraumatic.      Right Ear: External ear normal.      Left Ear: External ear normal.      Nose: Nose normal.   Eyes:      Pupils: Pupils are equal, round, and reactive to light.   Neck:      Musculoskeletal: Normal range of motion and neck supple.      Thyroid: No thyromegaly.      Vascular: No JVD.      Trachea: No tracheal deviation.   Cardiovascular:      Rate and Rhythm: Normal rate.      Heart sounds: Normal heart sounds. No murmur.   Pulmonary:      Effort: Pulmonary effort is normal. No respiratory distress.      Breath sounds: Normal breath sounds. No wheezing or rales.   Chest:      Chest wall: No tenderness.   Abdominal:      General: Bowel sounds are normal. There is no distension.      Palpations: Abdomen is soft. There is no mass.      Tenderness: There is no abdominal tenderness. There is no guarding or rebound.   Musculoskeletal: Normal range of motion.         General: No tenderness.   Lymphadenopathy:      Cervical: No cervical adenopathy.   Skin:     General: Skin is warm and dry.      Coloration: Skin is not pale.      Findings: No erythema or rash.   Neurological:      Mental Status: She is alert and oriented to person, place, and time.       Cranial Nerves: No cranial nerve deficit.      Motor: No abnormal muscle tone.      Coordination: Coordination normal.      Deep Tendon Reflexes: Reflexes are normal and symmetric. Reflexes normal.   Psychiatric:         Behavior: Behavior normal.         Thought Content: Thought content normal.         Judgment: Judgment normal.         Assessment:       1. Hospital discharge follow-up    2. Acute right-sided low back pain without sciatica    3. Fall, initial encounter    4. Hypokalemia        Plan:   Alysia ROBERTS was seen today for hospital follow up and back pain.    Diagnoses and all orders for this visit:    Hospital discharge follow-up    Acute right-sided low back pain without sciatica    Fall, initial encounter    Hypokalemia      Problem List Items Addressed This Visit     Fall      Other Visit Diagnoses     Hospital discharge follow-up    -  Primary    Acute right-sided low back pain without sciatica            Has Home health with PT ordered.  Has tramadol prn. Use tylenol first   Stop potassium tablet as she is on spironolactone and she eats a banana everyday   Watch for signs and sx's of subdural hematoma since those can show up later. This was discussed.       No follow-ups on file.

## 2020-08-12 ENCOUNTER — DOCUMENT SCAN (OUTPATIENT)
Dept: HOME HEALTH SERVICES | Facility: HOSPITAL | Age: 85
End: 2020-08-12
Payer: MEDICARE

## 2020-08-17 ENCOUNTER — DOCUMENT SCAN (OUTPATIENT)
Dept: HOME HEALTH SERVICES | Facility: HOSPITAL | Age: 85
End: 2020-08-17
Payer: MEDICARE

## 2020-08-17 ENCOUNTER — TELEPHONE (OUTPATIENT)
Dept: FAMILY MEDICINE | Facility: CLINIC | Age: 85
End: 2020-08-17

## 2020-08-20 ENCOUNTER — EXTERNAL HOME HEALTH (OUTPATIENT)
Dept: HOME HEALTH SERVICES | Facility: HOSPITAL | Age: 85
End: 2020-08-20
Payer: MEDICARE

## 2020-08-20 ENCOUNTER — TELEPHONE (OUTPATIENT)
Dept: FAMILY MEDICINE | Facility: CLINIC | Age: 85
End: 2020-08-20

## 2020-08-20 NOTE — TELEPHONE ENCOUNTER
----- Message from Rosa Smith sent at 2020  2:40 PM CDT -----  Regarding: PT Orders  Contact: Pamella morales [facility]  Alysia Ross  MRN: [id number]  : 3/1/1931  PCP: Carl Aguayo  Home Phone      [phone]  Work Phone      Not on file.  Mobile          Not on file.      MESSAGE:    Request to speak to a nurse for a verbal order for PT due to increased back pain.     Phone # [phone]    Pharmacy - [facility] [address] [place] [address]

## 2020-08-21 ENCOUNTER — DOCUMENT SCAN (OUTPATIENT)
Dept: HOME HEALTH SERVICES | Facility: HOSPITAL | Age: 85
End: 2020-08-21
Payer: MEDICARE

## 2020-08-24 ENCOUNTER — DOCUMENT SCAN (OUTPATIENT)
Dept: HOME HEALTH SERVICES | Facility: HOSPITAL | Age: 85
End: 2020-08-24
Payer: MEDICARE

## 2020-08-24 DIAGNOSIS — R53.81 DEBILITY: Primary | ICD-10-CM

## 2020-08-26 ENCOUNTER — DOCUMENT SCAN (OUTPATIENT)
Dept: HOME HEALTH SERVICES | Facility: HOSPITAL | Age: 85
End: 2020-08-26
Payer: MEDICARE

## 2020-08-28 ENCOUNTER — DOCUMENT SCAN (OUTPATIENT)
Dept: HOME HEALTH SERVICES | Facility: HOSPITAL | Age: 85
End: 2020-08-28
Payer: MEDICARE

## 2020-09-10 ENCOUNTER — DOCUMENT SCAN (OUTPATIENT)
Dept: HOME HEALTH SERVICES | Facility: HOSPITAL | Age: 85
End: 2020-09-10
Payer: MEDICARE

## 2020-09-10 PROCEDURE — 99499 NO LOS: ICD-10-PCS | Mod: ,,, | Performed by: FAMILY MEDICINE

## 2020-09-10 PROCEDURE — 99499 UNLISTED E&M SERVICE: CPT | Mod: ,,, | Performed by: FAMILY MEDICINE

## 2020-09-15 ENCOUNTER — TELEPHONE (OUTPATIENT)
Dept: FAMILY MEDICINE | Facility: CLINIC | Age: 85
End: 2020-09-15

## 2020-09-15 NOTE — TELEPHONE ENCOUNTER
Melinda with [facility] requesting treatment for 1X1 wound to coccyx. Requesting to:  Clean with Normal Saline, pat dry  Apply Pomgrand with foam border gauze to cover wound.     Also states 2 staple areas with chaffing.  Requesting to use antibiotic ointment and cover with border gauze.       Approved per JAYLEN Aguayo MD/LRLPn.    Notified Melinda with [facility] of approval.

## 2020-09-17 ENCOUNTER — DOCUMENT SCAN (OUTPATIENT)
Dept: HOME HEALTH SERVICES | Facility: HOSPITAL | Age: 85
End: 2020-09-17
Payer: MEDICARE

## 2020-09-22 ENCOUNTER — DOCUMENT SCAN (OUTPATIENT)
Dept: HOME HEALTH SERVICES | Facility: HOSPITAL | Age: 85
End: 2020-09-22
Payer: MEDICARE

## 2020-09-25 ENCOUNTER — DOCUMENT SCAN (OUTPATIENT)
Dept: HOME HEALTH SERVICES | Facility: HOSPITAL | Age: 85
End: 2020-09-25
Payer: MEDICARE

## 2020-09-29 ENCOUNTER — DOCUMENT SCAN (OUTPATIENT)
Dept: HOME HEALTH SERVICES | Facility: HOSPITAL | Age: 85
End: 2020-09-29
Payer: MEDICARE

## 2020-09-29 ENCOUNTER — PATIENT MESSAGE (OUTPATIENT)
Dept: OTHER | Facility: OTHER | Age: 85
End: 2020-09-29

## 2020-10-05 ENCOUNTER — OFFICE VISIT (OUTPATIENT)
Dept: INTERNAL MEDICINE | Facility: CLINIC | Age: 85
End: 2020-10-05
Payer: MEDICARE

## 2020-10-05 VITALS
HEART RATE: 60 BPM | BODY MASS INDEX: 35.46 KG/M2 | TEMPERATURE: 98 F | HEIGHT: 62 IN | DIASTOLIC BLOOD PRESSURE: 78 MMHG | OXYGEN SATURATION: 95 % | SYSTOLIC BLOOD PRESSURE: 122 MMHG | WEIGHT: 192.69 LBS | RESPIRATION RATE: 18 BRPM

## 2020-10-05 DIAGNOSIS — E66.01 SEVERE OBESITY (BMI 35.0-35.9 WITH COMORBIDITY): ICD-10-CM

## 2020-10-05 DIAGNOSIS — B37.2 CANDIDIASIS, INTERTRIGO: Primary | ICD-10-CM

## 2020-10-05 PROCEDURE — 99213 OFFICE O/P EST LOW 20 MIN: CPT | Mod: S$PBB,,, | Performed by: NURSE PRACTITIONER

## 2020-10-05 PROCEDURE — 99999 PR PBB SHADOW E&M-EST. PATIENT-LVL IV: ICD-10-PCS | Mod: PBBFAC,,, | Performed by: NURSE PRACTITIONER

## 2020-10-05 PROCEDURE — 99214 OFFICE O/P EST MOD 30 MIN: CPT | Mod: PBBFAC,PN | Performed by: NURSE PRACTITIONER

## 2020-10-05 PROCEDURE — 99213 PR OFFICE/OUTPT VISIT, EST, LEVL III, 20-29 MIN: ICD-10-PCS | Mod: S$PBB,,, | Performed by: NURSE PRACTITIONER

## 2020-10-05 PROCEDURE — 99999 PR PBB SHADOW E&M-EST. PATIENT-LVL IV: CPT | Mod: PBBFAC,,, | Performed by: NURSE PRACTITIONER

## 2020-10-05 RX ORDER — CLOTRIMAZOLE AND BETAMETHASONE DIPROPIONATE 10; .64 MG/G; MG/G
CREAM TOPICAL 2 TIMES DAILY
Qty: 45 G | Refills: 2 | Status: SHIPPED | OUTPATIENT
Start: 2020-10-05 | End: 2022-01-01

## 2020-10-05 RX ORDER — NYSTATIN 100000 [USP'U]/G
POWDER TOPICAL 2 TIMES DAILY
Qty: 60 G | Refills: 2 | Status: SHIPPED | OUTPATIENT
Start: 2020-10-05 | End: 2021-03-31 | Stop reason: SDUPTHER

## 2020-10-05 NOTE — PROGRESS NOTES
Subjective:       Patient ID: Alysia Ross is a 89 y.o. female.    Chief Complaint: Rash (under breasts -)    Patient is known, to me and presents with   Chief Complaint   Patient presents with    Rash     under breasts -   .  Denies chest pain and shortness of breath.  patient presents with intertrigo to bilateral breast folds. States that there is no drainage or smell to area. Does not itch or is not painful    HPI  Review of Systems   Constitutional: Negative.    Respiratory: Negative.    Cardiovascular: Negative.    Skin: Positive for rash. Negative for color change, pallor and wound.   Hematological: Negative.        Objective:      Physical Exam  Constitutional:       Appearance: Normal appearance. She is obese.   Neck:      Musculoskeletal: Normal range of motion and neck supple. No muscular tenderness.   Cardiovascular:      Rate and Rhythm: Normal rate and regular rhythm.      Heart sounds: Normal heart sounds. No murmur.   Pulmonary:      Effort: Pulmonary effort is normal.      Breath sounds: Normal breath sounds. No wheezing.   Chest:       Lymphadenopathy:      Cervical: No cervical adenopathy.   Skin:     General: Skin is warm and dry.      Capillary Refill: Capillary refill takes less than 2 seconds.      Coloration: Skin is not jaundiced or pale.      Findings: Erythema and rash present. No bruising or lesion. Rash is macular and papular.      Comments: satellite lesions noted to bilateral breast folds. Some maceration noted but no drainage and no odor. Erythematous with maculopapular rash   Neurological:      Mental Status: She is alert.         Assessment:       1. Candidiasis, intertrigo    2. Severe obesity (BMI 35.0-35.9 with comorbidity)        Plan:   Alysia ROBERTS was seen today for rash.    Diagnoses and all orders for this visit:    Candidiasis, intertrigo  -     clotrimazole-betamethasone 1-0.05% (LOTRISONE) cream; Apply topically 2 (two) times daily.  -     nystatin (MYCOSTATIN)  "powder; Apply topically 2 (two) times daily.    Severe obesity (BMI 35.0-35.9 with comorbidity)    "This note will not be shared with the patient."  If no improvement will let me know  Apply cream and let absorb than apply powder to affected areas  rtc as scheduled    "

## 2020-10-05 NOTE — PATIENT INSTRUCTIONS
Candida Skin Infection (Adult)  Candida is type of yeast. It grows naturally on the skin and in the mouth. If it grows out of control, it can cause an infection. Candida can cause infections in the genital area, skin folds, in the mouth, and under the breasts. Anyone can get this infection. It is more common in a person with a weak immune system, such as from diabetes, HIV, or cancer. Its also more common in someone who has been on antibiotic therapy. And its more common people who are overweight or who have incontinence. Wearing tight-fitting clothing and taking part in activities with lots of skin-to-skin contact can also put you at risk.  Candida causes the skin to become bright red and inflamed. The border of the infected part of the skin is often raised. The infection causes pain and itching. Sometimes the skin peels and bleeds. In the mouth, candida is called thrush, and may cause white thickened areas.  A Candida rash is most often treated with an antifungal cream or ointment. The rash will clear a few days after starting the medicine. Infections that dont go away may need a prescription medicine. In rare cases, a bacterial infection can also occur.  Home care  Your healthcare provider will recommend an antifungal cream or ointment for the rash. He or she may also prescribe a medicine for the itch. Follow all instructions for using these medicines. Dont use cornstarch powder. Cornstarch can cause the Candida infection to get worse.  General care:  · Keep your skin clean by washing the area twice a day.  · Use the cream as directed until your rash is gone. Once the skin has healed, keep it dry to prevent another infection.   · If you are overweight, talk with your healthcare provider about a plan to lose excess weight.  · Avoid clothes that fit tightly.  Follow-up care  Follow up with your healthcare provider, or as advised. Your rash will clear in 7 to 14 days. Call your healthcare provider if the rash  is not gone after 14 days.  When to seek medical advice  Call your healthcare provider right away if any of these occur:  · Pain or redness that gets worse or spreads  · Fluid coming from the skin  · Yellow crusts on the skin  · Fever of 100.4°F (38°C) or higher, or as directed by your healthcare provider  Date Last Reviewed: 9/1/2016  © 3967-1378 [employer]. [address] [place] [address], [place] [address]. All rights reserved. This information is not intended as a substitute for professional medical care. Always follow your healthcare professional's instructions.

## 2020-10-08 ENCOUNTER — DOCUMENT SCAN (OUTPATIENT)
Dept: HOME HEALTH SERVICES | Facility: HOSPITAL | Age: 85
End: 2020-10-08
Payer: MEDICARE

## 2020-12-11 ENCOUNTER — PATIENT MESSAGE (OUTPATIENT)
Dept: OTHER | Facility: OTHER | Age: 85
End: 2020-12-11

## 2021-02-04 ENCOUNTER — OFFICE VISIT (OUTPATIENT)
Dept: INTERNAL MEDICINE | Facility: CLINIC | Age: 86
End: 2021-02-04
Payer: MEDICARE

## 2021-02-04 VITALS
BODY MASS INDEX: 36.63 KG/M2 | TEMPERATURE: 98 F | DIASTOLIC BLOOD PRESSURE: 80 MMHG | HEART RATE: 60 BPM | OXYGEN SATURATION: 96 % | HEIGHT: 62 IN | RESPIRATION RATE: 18 BRPM | SYSTOLIC BLOOD PRESSURE: 118 MMHG | WEIGHT: 199.06 LBS

## 2021-02-04 DIAGNOSIS — E66.01 SEVERE OBESITY (BMI 35.0-39.9) WITH COMORBIDITY: ICD-10-CM

## 2021-02-04 DIAGNOSIS — F32.A DEPRESSION, UNSPECIFIED DEPRESSION TYPE: ICD-10-CM

## 2021-02-04 DIAGNOSIS — G89.29 CHRONIC RIGHT-SIDED LOW BACK PAIN WITHOUT SCIATICA: ICD-10-CM

## 2021-02-04 DIAGNOSIS — R60.9 DEPENDENT EDEMA: ICD-10-CM

## 2021-02-04 DIAGNOSIS — I10 ESSENTIAL HYPERTENSION: Primary | ICD-10-CM

## 2021-02-04 DIAGNOSIS — M54.50 CHRONIC RIGHT-SIDED LOW BACK PAIN WITHOUT SCIATICA: ICD-10-CM

## 2021-02-04 DIAGNOSIS — E78.2 MIXED HYPERLIPIDEMIA: ICD-10-CM

## 2021-02-04 DIAGNOSIS — R25.1 TREMOR: ICD-10-CM

## 2021-02-04 DIAGNOSIS — M81.0 OSTEOPOROSIS, UNSPECIFIED OSTEOPOROSIS TYPE, UNSPECIFIED PATHOLOGICAL FRACTURE PRESENCE: ICD-10-CM

## 2021-02-04 DIAGNOSIS — N18.31 STAGE 3A CHRONIC KIDNEY DISEASE: ICD-10-CM

## 2021-02-04 PROCEDURE — 99214 OFFICE O/P EST MOD 30 MIN: CPT | Mod: PBBFAC,PN | Performed by: NURSE PRACTITIONER

## 2021-02-04 PROCEDURE — 99999 PR PBB SHADOW E&M-EST. PATIENT-LVL IV: ICD-10-PCS | Mod: PBBFAC,,, | Performed by: NURSE PRACTITIONER

## 2021-02-04 PROCEDURE — 99214 OFFICE O/P EST MOD 30 MIN: CPT | Mod: S$PBB,,, | Performed by: NURSE PRACTITIONER

## 2021-02-04 PROCEDURE — 99214 PR OFFICE/OUTPT VISIT, EST, LEVL IV, 30-39 MIN: ICD-10-PCS | Mod: S$PBB,,, | Performed by: NURSE PRACTITIONER

## 2021-02-04 PROCEDURE — 99999 PR PBB SHADOW E&M-EST. PATIENT-LVL IV: CPT | Mod: PBBFAC,,, | Performed by: NURSE PRACTITIONER

## 2021-02-04 RX ORDER — ALENDRONATE SODIUM 70 MG/1
70 TABLET ORAL
Qty: 12 TABLET | Refills: 1 | Status: SHIPPED | OUTPATIENT
Start: 2021-02-04 | End: 2021-07-06

## 2021-02-04 RX ORDER — SIMVASTATIN 40 MG/1
40 TABLET, FILM COATED ORAL NIGHTLY
Qty: 90 TABLET | Refills: 1 | Status: SHIPPED | OUTPATIENT
Start: 2021-02-04 | End: 2021-09-05 | Stop reason: SDUPTHER

## 2021-02-04 RX ORDER — SPIRONOLACTONE AND HYDROCHLOROTHIAZIDE 25; 25 MG/1; MG/1
1 TABLET ORAL DAILY
Qty: 90 TABLET | Refills: 1 | Status: SHIPPED | OUTPATIENT
Start: 2021-02-04 | End: 2021-07-30

## 2021-02-04 RX ORDER — AMLODIPINE BESYLATE 2.5 MG/1
2.5 TABLET ORAL DAILY
Qty: 90 TABLET | Refills: 1 | Status: SHIPPED | OUTPATIENT
Start: 2021-02-04 | End: 2021-07-30

## 2021-02-04 RX ORDER — LISINOPRIL 40 MG/1
40 TABLET ORAL DAILY
Qty: 90 TABLET | Refills: 1 | Status: SHIPPED | OUTPATIENT
Start: 2021-02-04 | End: 2021-11-05

## 2021-02-04 RX ORDER — LIDOCAINE 50 MG/G
1 PATCH TOPICAL DAILY
Qty: 30 PATCH | Refills: 2 | Status: SHIPPED | OUTPATIENT
Start: 2021-02-04 | End: 2022-01-01

## 2021-02-04 RX ORDER — CLONAZEPAM 0.5 MG/1
0.25 TABLET ORAL DAILY
Qty: 45 TABLET | Refills: 5 | Status: SHIPPED | OUTPATIENT
Start: 2021-02-04 | End: 2021-09-15 | Stop reason: SDUPTHER

## 2021-02-04 RX ORDER — CITALOPRAM 10 MG/1
10 TABLET ORAL DAILY
Qty: 90 TABLET | Refills: 1 | Status: SHIPPED | OUTPATIENT
Start: 2021-02-04 | End: 2021-07-30

## 2021-02-04 RX ORDER — METOPROLOL SUCCINATE 25 MG/1
25 TABLET, EXTENDED RELEASE ORAL DAILY
Qty: 90 TABLET | Refills: 1 | Status: SHIPPED | OUTPATIENT
Start: 2021-02-04 | End: 2021-10-18

## 2021-02-04 RX ORDER — PRIMIDONE 50 MG/1
50 TABLET ORAL 3 TIMES DAILY
Qty: 270 TABLET | Refills: 1 | Status: SHIPPED | OUTPATIENT
Start: 2021-02-04 | End: 2021-07-30

## 2021-02-11 ENCOUNTER — OFFICE VISIT (OUTPATIENT)
Dept: INTERNAL MEDICINE | Facility: CLINIC | Age: 86
End: 2021-02-11
Payer: MEDICARE

## 2021-02-11 VITALS
BODY MASS INDEX: 36.55 KG/M2 | RESPIRATION RATE: 18 BRPM | HEIGHT: 62 IN | WEIGHT: 198.63 LBS | OXYGEN SATURATION: 95 % | HEART RATE: 61 BPM | SYSTOLIC BLOOD PRESSURE: 114 MMHG | TEMPERATURE: 98 F | DIASTOLIC BLOOD PRESSURE: 80 MMHG

## 2021-02-11 DIAGNOSIS — N18.31 STAGE 3A CHRONIC KIDNEY DISEASE: ICD-10-CM

## 2021-02-11 DIAGNOSIS — M54.50 CHRONIC RIGHT-SIDED LOW BACK PAIN WITHOUT SCIATICA: ICD-10-CM

## 2021-02-11 DIAGNOSIS — F32.A DEPRESSION, UNSPECIFIED DEPRESSION TYPE: ICD-10-CM

## 2021-02-11 DIAGNOSIS — E66.01 SEVERE OBESITY (BMI 35.0-39.9) WITH COMORBIDITY: ICD-10-CM

## 2021-02-11 DIAGNOSIS — I10 ESSENTIAL HYPERTENSION: ICD-10-CM

## 2021-02-11 DIAGNOSIS — H61.23 BILATERAL IMPACTED CERUMEN: ICD-10-CM

## 2021-02-11 DIAGNOSIS — R25.1 TREMOR: ICD-10-CM

## 2021-02-11 DIAGNOSIS — G89.29 CHRONIC RIGHT-SIDED LOW BACK PAIN WITHOUT SCIATICA: ICD-10-CM

## 2021-02-11 DIAGNOSIS — M81.0 OSTEOPOROSIS, UNSPECIFIED OSTEOPOROSIS TYPE, UNSPECIFIED PATHOLOGICAL FRACTURE PRESENCE: ICD-10-CM

## 2021-02-11 DIAGNOSIS — E78.2 MIXED HYPERLIPIDEMIA: ICD-10-CM

## 2021-02-11 DIAGNOSIS — R60.9 DEPENDENT EDEMA: ICD-10-CM

## 2021-02-11 LAB
ALBUMIN SERPL BCP-MCNC: 3.4 G/DL (ref 3.5–5.2)
ALBUMIN/CREAT UR: 32.9 UG/MG (ref 0–30)
ALP SERPL-CCNC: 84 U/L (ref 55–135)
ALT SERPL W/O P-5'-P-CCNC: 13 U/L (ref 10–44)
ANION GAP SERPL CALC-SCNC: 15 MMOL/L (ref 8–16)
AST SERPL-CCNC: 26 U/L (ref 10–40)
BASOPHILS # BLD AUTO: 0.03 K/UL (ref 0–0.2)
BASOPHILS NFR BLD: 0.5 % (ref 0–1.9)
BILIRUB SERPL-MCNC: 0.4 MG/DL (ref 0.1–1)
BUN SERPL-MCNC: 28 MG/DL (ref 8–23)
CALCIUM SERPL-MCNC: 9.3 MG/DL (ref 8.7–10.5)
CHLORIDE SERPL-SCNC: 102 MMOL/L (ref 95–110)
CHOLEST SERPL-MCNC: 174 MG/DL (ref 120–199)
CHOLEST/HDLC SERPL: 4.6 {RATIO} (ref 2–5)
CO2 SERPL-SCNC: 24 MMOL/L (ref 23–29)
CREAT SERPL-MCNC: 1 MG/DL (ref 0.5–1.4)
CREAT UR-MCNC: 70 MG/DL (ref 15–325)
DIFFERENTIAL METHOD: ABNORMAL
EOSINOPHIL # BLD AUTO: 0.2 K/UL (ref 0–0.5)
EOSINOPHIL NFR BLD: 3 % (ref 0–8)
ERYTHROCYTE [DISTWIDTH] IN BLOOD BY AUTOMATED COUNT: 15.3 % (ref 11.5–14.5)
EST. GFR  (AFRICAN AMERICAN): 57.7 ML/MIN/1.73 M^2
EST. GFR  (NON AFRICAN AMERICAN): 50.1 ML/MIN/1.73 M^2
GLUCOSE SERPL-MCNC: 95 MG/DL (ref 70–110)
HCT VFR BLD AUTO: 37.2 % (ref 37–48.5)
HDLC SERPL-MCNC: 38 MG/DL (ref 40–75)
HDLC SERPL: 21.8 % (ref 20–50)
HGB BLD-MCNC: 11.5 G/DL (ref 12–16)
IMM GRANULOCYTES # BLD AUTO: 0.01 K/UL (ref 0–0.04)
IMM GRANULOCYTES NFR BLD AUTO: 0.2 % (ref 0–0.5)
LDLC SERPL CALC-MCNC: 113.4 MG/DL (ref 63–159)
LYMPHOCYTES # BLD AUTO: 1.8 K/UL (ref 1–4.8)
LYMPHOCYTES NFR BLD: 27.6 % (ref 18–48)
MCH RBC QN AUTO: 31.3 PG (ref 27–31)
MCHC RBC AUTO-ENTMCNC: 30.9 G/DL (ref 32–36)
MCV RBC AUTO: 101 FL (ref 82–98)
MICROALBUMIN UR DL<=1MG/L-MCNC: 23 UG/ML
MONOCYTES # BLD AUTO: 0.7 K/UL (ref 0.3–1)
MONOCYTES NFR BLD: 11.6 % (ref 4–15)
NEUTROPHILS # BLD AUTO: 3.6 K/UL (ref 1.8–7.7)
NEUTROPHILS NFR BLD: 57.1 % (ref 38–73)
NONHDLC SERPL-MCNC: 136 MG/DL
NRBC BLD-RTO: 0 /100 WBC
PLATELET # BLD AUTO: 280 K/UL (ref 150–350)
PMV BLD AUTO: 9.8 FL (ref 9.2–12.9)
POTASSIUM SERPL-SCNC: 4.2 MMOL/L (ref 3.5–5.1)
PROT SERPL-MCNC: 7.7 G/DL (ref 6–8.4)
RBC # BLD AUTO: 3.67 M/UL (ref 4–5.4)
SODIUM SERPL-SCNC: 141 MMOL/L (ref 136–145)
TRIGL SERPL-MCNC: 113 MG/DL (ref 30–150)
TSH SERPL DL<=0.005 MIU/L-ACNC: 1.61 UIU/ML (ref 0.4–4)
WBC # BLD AUTO: 6.37 K/UL (ref 3.9–12.7)

## 2021-02-11 PROCEDURE — 99214 PR OFFICE/OUTPT VISIT, EST, LEVL IV, 30-39 MIN: ICD-10-PCS | Mod: S$PBB,,, | Performed by: NURSE PRACTITIONER

## 2021-02-11 PROCEDURE — 99214 OFFICE O/P EST MOD 30 MIN: CPT | Mod: S$PBB,,, | Performed by: NURSE PRACTITIONER

## 2021-02-11 PROCEDURE — 84443 ASSAY THYROID STIM HORMONE: CPT

## 2021-02-11 PROCEDURE — 99215 OFFICE O/P EST HI 40 MIN: CPT | Mod: PBBFAC,PN | Performed by: NURSE PRACTITIONER

## 2021-02-11 PROCEDURE — 80061 LIPID PANEL: CPT

## 2021-02-11 PROCEDURE — 82570 ASSAY OF URINE CREATININE: CPT

## 2021-02-11 PROCEDURE — 80053 COMPREHEN METABOLIC PANEL: CPT

## 2021-02-11 PROCEDURE — 99999 PR PBB SHADOW E&M-EST. PATIENT-LVL V: ICD-10-PCS | Mod: PBBFAC,,, | Performed by: NURSE PRACTITIONER

## 2021-02-11 PROCEDURE — 85025 COMPLETE CBC W/AUTO DIFF WBC: CPT

## 2021-02-11 PROCEDURE — 36415 COLL VENOUS BLD VENIPUNCTURE: CPT

## 2021-02-11 PROCEDURE — 99999 PR PBB SHADOW E&M-EST. PATIENT-LVL V: CPT | Mod: PBBFAC,,, | Performed by: NURSE PRACTITIONER

## 2021-02-11 PROCEDURE — 82043 UR ALBUMIN QUANTITATIVE: CPT

## 2021-03-31 DIAGNOSIS — B37.2 CANDIDIASIS, INTERTRIGO: ICD-10-CM

## 2021-04-01 RX ORDER — NYSTATIN 100000 [USP'U]/G
POWDER TOPICAL 2 TIMES DAILY
Qty: 60 G | Refills: 2 | Status: SHIPPED | OUTPATIENT
Start: 2021-04-01 | End: 2022-03-31 | Stop reason: SDUPTHER

## 2021-05-06 ENCOUNTER — OFFICE VISIT (OUTPATIENT)
Dept: INTERNAL MEDICINE | Facility: CLINIC | Age: 86
End: 2021-05-06
Payer: MEDICARE

## 2021-05-06 VITALS
SYSTOLIC BLOOD PRESSURE: 118 MMHG | OXYGEN SATURATION: 100 % | WEIGHT: 199.06 LBS | HEIGHT: 62 IN | BODY MASS INDEX: 36.63 KG/M2 | HEART RATE: 55 BPM | DIASTOLIC BLOOD PRESSURE: 74 MMHG | RESPIRATION RATE: 14 BRPM

## 2021-05-06 DIAGNOSIS — R53.81 PHYSICAL DECONDITIONING: ICD-10-CM

## 2021-05-06 DIAGNOSIS — S39.012A STRAIN OF LUMBAR REGION, INITIAL ENCOUNTER: Primary | ICD-10-CM

## 2021-05-06 PROCEDURE — 99214 PR OFFICE/OUTPT VISIT, EST, LEVL IV, 30-39 MIN: ICD-10-PCS | Mod: S$PBB,,, | Performed by: NURSE PRACTITIONER

## 2021-05-06 PROCEDURE — 99215 OFFICE O/P EST HI 40 MIN: CPT | Mod: PBBFAC,PN | Performed by: NURSE PRACTITIONER

## 2021-05-06 PROCEDURE — 99999 PR PBB SHADOW E&M-EST. PATIENT-LVL V: CPT | Mod: PBBFAC,,, | Performed by: NURSE PRACTITIONER

## 2021-05-06 PROCEDURE — 99999 PR PBB SHADOW E&M-EST. PATIENT-LVL V: ICD-10-PCS | Mod: PBBFAC,,, | Performed by: NURSE PRACTITIONER

## 2021-05-06 PROCEDURE — 99214 OFFICE O/P EST MOD 30 MIN: CPT | Mod: S$PBB,,, | Performed by: NURSE PRACTITIONER

## 2021-05-10 ENCOUNTER — HOSPITAL ENCOUNTER (EMERGENCY)
Facility: HOSPITAL | Age: 86
Discharge: HOME | End: 2021-05-11
Attending: EMERGENCY MEDICINE
Payer: MEDICARE

## 2021-05-10 DIAGNOSIS — S22.080A COMPRESSION FRACTURE OF T11 VERTEBRA, INITIAL ENCOUNTER: ICD-10-CM

## 2021-05-10 DIAGNOSIS — R10.30 LOWER ABDOMINAL PAIN: Primary | ICD-10-CM

## 2021-05-10 DIAGNOSIS — S22.080K COMPRESSION FRACTURE OF T12 VERTEBRA WITH NONUNION, SUBSEQUENT ENCOUNTER: ICD-10-CM

## 2021-05-10 DIAGNOSIS — R31.9 URINARY TRACT INFECTION WITH HEMATURIA, SITE UNSPECIFIED: ICD-10-CM

## 2021-05-10 DIAGNOSIS — N39.0 URINARY TRACT INFECTION WITH HEMATURIA, SITE UNSPECIFIED: ICD-10-CM

## 2021-05-10 DIAGNOSIS — M47.816 LUMBAR SPONDYLOSIS: ICD-10-CM

## 2021-05-10 PROCEDURE — 99285 EMERGENCY DEPT VISIT HI MDM: CPT

## 2021-05-10 PROCEDURE — G0180 MD CERTIFICATION HHA PATIENT: HCPCS | Mod: ,,, | Performed by: NURSE PRACTITIONER

## 2021-05-10 PROCEDURE — G0180 PR HOME HEALTH MD CERTIFICATION: ICD-10-PCS | Mod: ,,, | Performed by: NURSE PRACTITIONER

## 2021-05-11 VITALS
WEIGHT: 200 LBS | OXYGEN SATURATION: 94 % | TEMPERATURE: 98 F | HEART RATE: 67 BPM | DIASTOLIC BLOOD PRESSURE: 72 MMHG | BODY MASS INDEX: 36.58 KG/M2 | RESPIRATION RATE: 14 BRPM | SYSTOLIC BLOOD PRESSURE: 167 MMHG

## 2021-05-11 LAB
ALBUMIN SERPL BCP-MCNC: 3 G/DL (ref 3.5–5.2)
ALP SERPL-CCNC: 101 U/L (ref 55–135)
ALT SERPL W/O P-5'-P-CCNC: 11 U/L (ref 10–44)
ANION GAP SERPL CALC-SCNC: 11 MMOL/L (ref 8–16)
AST SERPL-CCNC: 10 U/L (ref 10–40)
BACTERIA #/AREA URNS HPF: ABNORMAL /HPF
BASOPHILS # BLD AUTO: 0.03 K/UL (ref 0–0.2)
BASOPHILS NFR BLD: 0.4 % (ref 0–1.9)
BILIRUB SERPL-MCNC: 0.4 MG/DL (ref 0.1–1)
BILIRUB UR QL STRIP: NEGATIVE
BUN SERPL-MCNC: 29 MG/DL (ref 8–23)
CALCIUM SERPL-MCNC: 8.9 MG/DL (ref 8.7–10.5)
CHLORIDE SERPL-SCNC: 101 MMOL/L (ref 95–110)
CLARITY UR: CLEAR
CO2 SERPL-SCNC: 27 MMOL/L (ref 23–29)
COLOR UR: YELLOW
CREAT SERPL-MCNC: 1.1 MG/DL (ref 0.5–1.4)
DIFFERENTIAL METHOD: ABNORMAL
EOSINOPHIL # BLD AUTO: 0.2 K/UL (ref 0–0.5)
EOSINOPHIL NFR BLD: 2.2 % (ref 0–8)
ERYTHROCYTE [DISTWIDTH] IN BLOOD BY AUTOMATED COUNT: 15.1 % (ref 11.5–14.5)
EST. GFR  (AFRICAN AMERICAN): 51 ML/MIN/1.73 M^2
EST. GFR  (NON AFRICAN AMERICAN): 44 ML/MIN/1.73 M^2
GLUCOSE SERPL-MCNC: 116 MG/DL (ref 70–110)
GLUCOSE UR QL STRIP: NEGATIVE
HCT VFR BLD AUTO: 34.3 % (ref 37–48.5)
HGB BLD-MCNC: 11.1 G/DL (ref 12–16)
HGB UR QL STRIP: NEGATIVE
HYALINE CASTS #/AREA URNS LPF: 2 /LPF
IMM GRANULOCYTES # BLD AUTO: 0.03 K/UL (ref 0–0.04)
IMM GRANULOCYTES NFR BLD AUTO: 0.4 % (ref 0–0.5)
KETONES UR QL STRIP: NEGATIVE
LEUKOCYTE ESTERASE UR QL STRIP: ABNORMAL
LIPASE SERPL-CCNC: 44 U/L (ref 4–60)
LYMPHOCYTES # BLD AUTO: 1.9 K/UL (ref 1–4.8)
LYMPHOCYTES NFR BLD: 23.5 % (ref 18–48)
MCH RBC QN AUTO: 31.3 PG (ref 27–31)
MCHC RBC AUTO-ENTMCNC: 32.4 G/DL (ref 32–36)
MCV RBC AUTO: 97 FL (ref 82–98)
MICROSCOPIC COMMENT: ABNORMAL
MONOCYTES # BLD AUTO: 0.9 K/UL (ref 0.3–1)
MONOCYTES NFR BLD: 11.4 % (ref 4–15)
NEUTROPHILS # BLD AUTO: 5 K/UL (ref 1.8–7.7)
NEUTROPHILS NFR BLD: 62.1 % (ref 38–73)
NITRITE UR QL STRIP: NEGATIVE
NON-SQ EPI CELLS #/AREA URNS HPF: 4 /HPF
NRBC BLD-RTO: 0 /100 WBC
PH UR STRIP: 7 [PH] (ref 5–8)
PLATELET # BLD AUTO: 278 K/UL (ref 150–450)
PMV BLD AUTO: 8.8 FL (ref 9.2–12.9)
POTASSIUM SERPL-SCNC: 4.1 MMOL/L (ref 3.5–5.1)
PROT SERPL-MCNC: 7.2 G/DL (ref 6–8.4)
PROT UR QL STRIP: NEGATIVE
RBC # BLD AUTO: 3.55 M/UL (ref 4–5.4)
RBC #/AREA URNS HPF: 3 /HPF (ref 0–4)
SODIUM SERPL-SCNC: 139 MMOL/L (ref 136–145)
SP GR UR STRIP: 1.01 (ref 1–1.03)
SQUAMOUS #/AREA URNS HPF: 8 /HPF
URN SPEC COLLECT METH UR: ABNORMAL
UROBILINOGEN UR STRIP-ACNC: NEGATIVE EU/DL
WBC # BLD AUTO: 8.1 K/UL (ref 3.9–12.7)
WBC #/AREA URNS HPF: 20 /HPF (ref 0–5)
WBC CLUMPS URNS QL MICRO: ABNORMAL

## 2021-05-11 PROCEDURE — 83690 ASSAY OF LIPASE: CPT | Performed by: EMERGENCY MEDICINE

## 2021-05-11 PROCEDURE — 36415 COLL VENOUS BLD VENIPUNCTURE: CPT | Performed by: EMERGENCY MEDICINE

## 2021-05-11 PROCEDURE — 80053 COMPREHEN METABOLIC PANEL: CPT | Performed by: EMERGENCY MEDICINE

## 2021-05-11 PROCEDURE — 25500020 PHARM REV CODE 255: Performed by: EMERGENCY MEDICINE

## 2021-05-11 PROCEDURE — 87086 URINE CULTURE/COLONY COUNT: CPT | Performed by: EMERGENCY MEDICINE

## 2021-05-11 PROCEDURE — 85025 COMPLETE CBC W/AUTO DIFF WBC: CPT | Performed by: EMERGENCY MEDICINE

## 2021-05-11 PROCEDURE — 81000 URINALYSIS NONAUTO W/SCOPE: CPT | Performed by: EMERGENCY MEDICINE

## 2021-05-11 RX ORDER — CIPROFLOXACIN 500 MG/1
500 TABLET ORAL 2 TIMES DAILY
Qty: 20 TABLET | Refills: 0 | Status: SHIPPED | OUTPATIENT
Start: 2021-05-11 | End: 2021-05-18

## 2021-05-11 RX ADMIN — IOHEXOL 100 ML: 350 INJECTION, SOLUTION INTRAVENOUS at 01:05

## 2021-05-12 LAB — BACTERIA UR CULT: NO GROWTH

## 2021-06-07 ENCOUNTER — EXTERNAL HOME HEALTH (OUTPATIENT)
Dept: HOME HEALTH SERVICES | Facility: HOSPITAL | Age: 86
End: 2021-06-07
Payer: MEDICARE

## 2021-06-10 ENCOUNTER — PES CALL (OUTPATIENT)
Dept: ADMINISTRATIVE | Facility: CLINIC | Age: 86
End: 2021-06-10

## 2021-06-11 ENCOUNTER — TELEPHONE (OUTPATIENT)
Dept: ADMINISTRATIVE | Facility: CLINIC | Age: 86
End: 2021-06-11

## 2021-06-21 ENCOUNTER — TELEPHONE (OUTPATIENT)
Dept: ADMINISTRATIVE | Facility: CLINIC | Age: 86
End: 2021-06-21

## 2021-06-22 ENCOUNTER — OFFICE VISIT (OUTPATIENT)
Dept: INTERNAL MEDICINE | Facility: CLINIC | Age: 86
End: 2021-06-22
Payer: MEDICARE

## 2021-06-22 VITALS
WEIGHT: 195.31 LBS | RESPIRATION RATE: 18 BRPM | SYSTOLIC BLOOD PRESSURE: 120 MMHG | BODY MASS INDEX: 35.94 KG/M2 | HEIGHT: 62 IN | DIASTOLIC BLOOD PRESSURE: 70 MMHG | HEART RATE: 72 BPM

## 2021-06-22 DIAGNOSIS — N18.31 STAGE 3A CHRONIC KIDNEY DISEASE: ICD-10-CM

## 2021-06-22 DIAGNOSIS — I69.90 LATE EFFECTS OF CVA (CEREBROVASCULAR ACCIDENT): ICD-10-CM

## 2021-06-22 DIAGNOSIS — E27.8 ADRENAL NODULE: ICD-10-CM

## 2021-06-22 DIAGNOSIS — I70.0 AORTIC ATHEROSCLEROSIS: ICD-10-CM

## 2021-06-22 DIAGNOSIS — I25.10 ATHEROSCLEROSIS OF NATIVE CORONARY ARTERY OF NATIVE HEART, ANGINA PRESENCE UNSPECIFIED: ICD-10-CM

## 2021-06-22 DIAGNOSIS — Z00.00 ENCOUNTER FOR PREVENTIVE HEALTH EXAMINATION: Primary | ICD-10-CM

## 2021-06-22 DIAGNOSIS — I70.219 ATHEROSCLEROTIC PVD WITH INTERMITTENT CLAUDICATION: ICD-10-CM

## 2021-06-22 DIAGNOSIS — F33.0 MILD EPISODE OF RECURRENT MAJOR DEPRESSIVE DISORDER: ICD-10-CM

## 2021-06-22 DIAGNOSIS — D64.9 ANEMIA, UNSPECIFIED TYPE: ICD-10-CM

## 2021-06-22 DIAGNOSIS — E78.2 MIXED HYPERLIPIDEMIA: ICD-10-CM

## 2021-06-22 DIAGNOSIS — Z87.81 HISTORY OF COMPRESSION FRACTURE OF SPINE: ICD-10-CM

## 2021-06-22 DIAGNOSIS — I10 ESSENTIAL HYPERTENSION: ICD-10-CM

## 2021-06-22 DIAGNOSIS — E66.01 SEVERE OBESITY (BMI 35.0-39.9) WITH COMORBIDITY: ICD-10-CM

## 2021-06-22 DIAGNOSIS — M81.0 OSTEOPOROSIS, UNSPECIFIED OSTEOPOROSIS TYPE, UNSPECIFIED PATHOLOGICAL FRACTURE PRESENCE: ICD-10-CM

## 2021-06-22 PROBLEM — R23.8 SKIN BREAKDOWN: Status: RESOLVED | Noted: 2019-01-14 | Resolved: 2021-06-22

## 2021-06-22 PROBLEM — R26.9 ABNORMALITY OF GAIT AND MOBILITY: Status: ACTIVE | Noted: 2021-06-22

## 2021-06-22 PROBLEM — Z99.89 DEPENDENCE ON OTHER ENABLING MACHINES AND DEVICES: Status: ACTIVE | Noted: 2021-06-22

## 2021-06-22 PROCEDURE — 99999 PR PBB SHADOW E&M-EST. PATIENT-LVL IV: CPT | Mod: PBBFAC,,, | Performed by: NURSE PRACTITIONER

## 2021-06-22 PROCEDURE — 99214 OFFICE O/P EST MOD 30 MIN: CPT | Mod: PBBFAC,PN | Performed by: NURSE PRACTITIONER

## 2021-06-22 PROCEDURE — 99999 PR PBB SHADOW E&M-EST. PATIENT-LVL IV: ICD-10-PCS | Mod: PBBFAC,,, | Performed by: NURSE PRACTITIONER

## 2021-06-22 PROCEDURE — G0439 PPPS, SUBSEQ VISIT: HCPCS | Mod: ,,, | Performed by: NURSE PRACTITIONER

## 2021-06-22 PROCEDURE — G0439 PR MEDICARE ANNUAL WELLNESS SUBSEQUENT VISIT: ICD-10-PCS | Mod: ,,, | Performed by: NURSE PRACTITIONER

## 2021-07-03 DIAGNOSIS — M81.0 OSTEOPOROSIS, UNSPECIFIED OSTEOPOROSIS TYPE, UNSPECIFIED PATHOLOGICAL FRACTURE PRESENCE: ICD-10-CM

## 2021-07-06 RX ORDER — ALENDRONATE SODIUM 70 MG/1
TABLET ORAL
Qty: 12 TABLET | Refills: 1 | Status: SHIPPED | OUTPATIENT
Start: 2021-07-06 | End: 2022-01-03

## 2021-07-11 PROBLEM — R09.02 HYPOXIA: Status: RESOLVED | Noted: 2020-08-07 | Resolved: 2021-07-11

## 2021-07-11 PROBLEM — I70.219 ATHEROSCLEROTIC PVD WITH INTERMITTENT CLAUDICATION: Status: ACTIVE | Noted: 2021-07-11

## 2021-07-11 PROBLEM — D64.9 ANEMIA: Status: ACTIVE | Noted: 2021-07-11

## 2021-07-11 PROBLEM — I70.0 AORTIC ATHEROSCLEROSIS: Status: ACTIVE | Noted: 2021-07-11

## 2021-07-11 PROBLEM — E27.8 ADRENAL NODULE: Status: ACTIVE | Noted: 2021-07-11

## 2021-07-11 PROBLEM — Z87.81 HISTORY OF COMPRESSION FRACTURE OF SPINE: Status: ACTIVE | Noted: 2021-05-06
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
## 2021-07-11 PROBLEM — I25.10 ATHEROSCLEROTIC CORONARY VASCULAR DISEASE: Status: ACTIVE | Noted: 2021-07-11

## 2021-07-11 PROBLEM — H35.3131 EARLY DRY STAGE NONEXUDATIVE AGE-RELATED MACULAR DEGENERATION OF BOTH EYES: Status: ACTIVE | Noted: 2021-07-11

## 2021-07-16 ENCOUNTER — OFFICE VISIT (OUTPATIENT)
Dept: INTERNAL MEDICINE | Facility: CLINIC | Age: 86
End: 2021-07-16
Payer: MEDICARE

## 2021-07-16 VITALS
SYSTOLIC BLOOD PRESSURE: 116 MMHG | WEIGHT: 199 LBS | DIASTOLIC BLOOD PRESSURE: 76 MMHG | RESPIRATION RATE: 20 BRPM | HEART RATE: 87 BPM | HEIGHT: 62 IN | TEMPERATURE: 97 F | OXYGEN SATURATION: 93 % | BODY MASS INDEX: 36.62 KG/M2

## 2021-07-16 DIAGNOSIS — J20.9 ACUTE BRONCHITIS, UNSPECIFIED ORGANISM: ICD-10-CM

## 2021-07-16 DIAGNOSIS — L89.301 PRESSURE INJURY OF BUTTOCK, STAGE 1, UNSPECIFIED LATERALITY: Primary | ICD-10-CM

## 2021-07-16 PROCEDURE — 99214 OFFICE O/P EST MOD 30 MIN: CPT | Mod: S$PBB,,, | Performed by: NURSE PRACTITIONER

## 2021-07-16 PROCEDURE — 99214 PR OFFICE/OUTPT VISIT, EST, LEVL IV, 30-39 MIN: ICD-10-PCS | Mod: S$PBB,,, | Performed by: NURSE PRACTITIONER

## 2021-07-16 PROCEDURE — 99999 PR PBB SHADOW E&M-EST. PATIENT-LVL V: CPT | Mod: PBBFAC,,, | Performed by: NURSE PRACTITIONER

## 2021-07-16 PROCEDURE — 99999 PR PBB SHADOW E&M-EST. PATIENT-LVL V: ICD-10-PCS | Mod: PBBFAC,,, | Performed by: NURSE PRACTITIONER

## 2021-07-16 PROCEDURE — 99215 OFFICE O/P EST HI 40 MIN: CPT | Mod: PBBFAC,PN | Performed by: NURSE PRACTITIONER

## 2021-07-16 RX ORDER — CODEINE PHOSPHATE AND GUAIFENESIN 10; 100 MG/5ML; MG/5ML
5 SOLUTION ORAL EVERY 6 HOURS PRN
Qty: 120 ML | Refills: 0 | Status: SHIPPED | OUTPATIENT
Start: 2021-07-16 | End: 2021-07-26

## 2021-07-16 RX ORDER — GENTAMICIN SULFATE 1 MG/G
OINTMENT TOPICAL 3 TIMES DAILY
Qty: 30 G | Refills: 2 | Status: SHIPPED | OUTPATIENT
Start: 2021-07-16 | End: 2022-01-01

## 2021-07-16 RX ORDER — DOXYCYCLINE HYCLATE 100 MG
100 TABLET ORAL EVERY 12 HOURS
Qty: 14 TABLET | Refills: 0 | Status: SHIPPED | OUTPATIENT
Start: 2021-07-16 | End: 2021-07-23

## 2021-07-17 ENCOUNTER — HOSPITAL ENCOUNTER (INPATIENT)
Facility: HOSPITAL | Age: 86
LOS: 4 days | Discharge: HOME HEALTH | DRG: 190 | End: 2021-07-22
Attending: FAMILY MEDICINE | Admitting: FAMILY MEDICINE
Payer: MEDICARE

## 2021-07-17 DIAGNOSIS — R26.9 ABNORMALITY OF GAIT AND MOBILITY: ICD-10-CM

## 2021-07-17 DIAGNOSIS — R05.9 COUGH: ICD-10-CM

## 2021-07-17 DIAGNOSIS — R06.02 SOB (SHORTNESS OF BREATH): ICD-10-CM

## 2021-07-17 DIAGNOSIS — J42 ACUTE EXACERBATION OF CHRONIC BRONCHITIS: ICD-10-CM

## 2021-07-17 DIAGNOSIS — Z20.822 COVID-19 VIRUS NOT DETECTED: ICD-10-CM

## 2021-07-17 DIAGNOSIS — R79.89 ELEVATED TROPONIN: ICD-10-CM

## 2021-07-17 DIAGNOSIS — J44.9 CHRONIC OBSTRUCTIVE PULMONARY DISEASE, UNSPECIFIED COPD TYPE: ICD-10-CM

## 2021-07-17 DIAGNOSIS — R53.81 PHYSICAL DECONDITIONING: ICD-10-CM

## 2021-07-17 DIAGNOSIS — J20.9 ACUTE EXACERBATION OF CHRONIC BRONCHITIS: ICD-10-CM

## 2021-07-17 DIAGNOSIS — R09.02 HYPOXIA: Primary | ICD-10-CM

## 2021-07-17 LAB
ALBUMIN SERPL BCP-MCNC: 3.1 G/DL (ref 3.5–5.2)
ALLENS TEST: ABNORMAL
ALP SERPL-CCNC: 81 U/L (ref 55–135)
ALT SERPL W/O P-5'-P-CCNC: 14 U/L (ref 10–44)
ANION GAP SERPL CALC-SCNC: 14 MMOL/L (ref 8–16)
AST SERPL-CCNC: 17 U/L (ref 10–40)
BASOPHILS # BLD AUTO: 0.02 K/UL (ref 0–0.2)
BASOPHILS NFR BLD: 0.3 % (ref 0–1.9)
BILIRUB SERPL-MCNC: 0.3 MG/DL (ref 0.1–1)
BILIRUB UR QL STRIP: NEGATIVE
BNP SERPL-MCNC: 163 PG/ML (ref 0–99)
BUN SERPL-MCNC: 35 MG/DL (ref 8–23)
CALCIUM SERPL-MCNC: 9.5 MG/DL (ref 8.7–10.5)
CHLORIDE SERPL-SCNC: 97 MMOL/L (ref 95–110)
CLARITY UR: CLEAR
CO2 SERPL-SCNC: 26 MMOL/L (ref 23–29)
COLOR UR: YELLOW
CREAT SERPL-MCNC: 1.1 MG/DL (ref 0.5–1.4)
DELSYS: ABNORMAL
DIFFERENTIAL METHOD: ABNORMAL
EOSINOPHIL # BLD AUTO: 0 K/UL (ref 0–0.5)
EOSINOPHIL NFR BLD: 0 % (ref 0–8)
ERYTHROCYTE [DISTWIDTH] IN BLOOD BY AUTOMATED COUNT: 15.6 % (ref 11.5–14.5)
EST. GFR  (AFRICAN AMERICAN): 51 ML/MIN/1.73 M^2
EST. GFR  (NON AFRICAN AMERICAN): 44 ML/MIN/1.73 M^2
GLUCOSE SERPL-MCNC: 131 MG/DL (ref 70–110)
GLUCOSE UR QL STRIP: NEGATIVE
HCO3 UR-SCNC: 29 MMOL/L (ref 22–26)
HCT VFR BLD AUTO: 34.9 % (ref 37–48.5)
HGB BLD-MCNC: 11.3 G/DL (ref 12–16)
HGB UR QL STRIP: NEGATIVE
IMM GRANULOCYTES # BLD AUTO: 0.02 K/UL (ref 0–0.04)
IMM GRANULOCYTES NFR BLD AUTO: 0.3 % (ref 0–0.5)
KETONES UR QL STRIP: NEGATIVE
LACTATE SERPL-SCNC: 1.3 MMOL/L (ref 0.5–2.2)
LEUKOCYTE ESTERASE UR QL STRIP: NEGATIVE
LYMPHOCYTES # BLD AUTO: 0.8 K/UL (ref 1–4.8)
LYMPHOCYTES NFR BLD: 11.7 % (ref 18–48)
MCH RBC QN AUTO: 31 PG (ref 27–31)
MCHC RBC AUTO-ENTMCNC: 32.4 G/DL (ref 32–36)
MCV RBC AUTO: 96 FL (ref 82–98)
MONOCYTES # BLD AUTO: 0.8 K/UL (ref 0.3–1)
MONOCYTES NFR BLD: 11.8 % (ref 4–15)
NEUTROPHILS # BLD AUTO: 5.4 K/UL (ref 1.8–7.7)
NEUTROPHILS NFR BLD: 75.9 % (ref 38–73)
NITRITE UR QL STRIP: NEGATIVE
NRBC BLD-RTO: 0 /100 WBC
PCO2 BLDA: 38 MMHG (ref 35–45)
PH SMN: 7.49 [PH] (ref 7.35–7.45)
PH UR STRIP: 6 [PH] (ref 5–8)
PLATELET # BLD AUTO: 228 K/UL (ref 150–450)
PMV BLD AUTO: 8.9 FL (ref 9.2–12.9)
PO2 BLDA: 51 MMHG (ref 75–100)
POC BE: 5.3 MMOL/L (ref -2–2)
POC COHB: 2 % (ref 0–3)
POC METHB: 1 % (ref 0–1.5)
POC O2HB ARTERIAL: 87.1 % (ref 94–100)
POC SATURATED O2: 89.7 % (ref 90–100)
POC TCO2: 30.2 MMOL/L
POC THB: 11.4 G/DL (ref 12–18)
POTASSIUM SERPL-SCNC: 4.1 MMOL/L (ref 3.5–5.1)
PROT SERPL-MCNC: 7.6 G/DL (ref 6–8.4)
PROT UR QL STRIP: NEGATIVE
RBC # BLD AUTO: 3.65 M/UL (ref 4–5.4)
SARS-COV-2 RDRP RESP QL NAA+PROBE: NEGATIVE
SITE: ABNORMAL
SODIUM SERPL-SCNC: 137 MMOL/L (ref 136–145)
SP GR UR STRIP: 1.02 (ref 1–1.03)
TROPONIN I SERPL DL<=0.01 NG/ML-MCNC: 0.06 NG/ML (ref 0–0.03)
TROPONIN I SERPL DL<=0.01 NG/ML-MCNC: 0.08 NG/ML (ref 0–0.03)
URN SPEC COLLECT METH UR: NORMAL
UROBILINOGEN UR STRIP-ACNC: NEGATIVE EU/DL
WBC # BLD AUTO: 7.12 K/UL (ref 3.9–12.7)

## 2021-07-17 PROCEDURE — 83880 ASSAY OF NATRIURETIC PEPTIDE: CPT | Performed by: FAMILY MEDICINE

## 2021-07-17 PROCEDURE — 36600 WITHDRAWAL OF ARTERIAL BLOOD: CPT

## 2021-07-17 PROCEDURE — 99285 EMERGENCY DEPT VISIT HI MDM: CPT | Mod: 25

## 2021-07-17 PROCEDURE — 36415 COLL VENOUS BLD VENIPUNCTURE: CPT | Performed by: FAMILY MEDICINE

## 2021-07-17 PROCEDURE — 85025 COMPLETE CBC W/AUTO DIFF WBC: CPT | Performed by: FAMILY MEDICINE

## 2021-07-17 PROCEDURE — 93010 EKG 12-LEAD: ICD-10-PCS | Mod: ,,, | Performed by: INTERNAL MEDICINE

## 2021-07-17 PROCEDURE — U0002 COVID-19 LAB TEST NON-CDC: HCPCS | Performed by: FAMILY MEDICINE

## 2021-07-17 PROCEDURE — 25000003 PHARM REV CODE 250: Performed by: FAMILY MEDICINE

## 2021-07-17 PROCEDURE — 93005 ELECTROCARDIOGRAM TRACING: CPT

## 2021-07-17 PROCEDURE — 84484 ASSAY OF TROPONIN QUANT: CPT | Mod: 91 | Performed by: FAMILY MEDICINE

## 2021-07-17 PROCEDURE — 82803 BLOOD GASES ANY COMBINATION: CPT | Performed by: FAMILY MEDICINE

## 2021-07-17 PROCEDURE — 87040 BLOOD CULTURE FOR BACTERIA: CPT | Mod: 59 | Performed by: FAMILY MEDICINE

## 2021-07-17 PROCEDURE — 81003 URINALYSIS AUTO W/O SCOPE: CPT | Performed by: FAMILY MEDICINE

## 2021-07-17 PROCEDURE — 83605 ASSAY OF LACTIC ACID: CPT | Performed by: FAMILY MEDICINE

## 2021-07-17 PROCEDURE — 80053 COMPREHEN METABOLIC PANEL: CPT | Performed by: FAMILY MEDICINE

## 2021-07-17 PROCEDURE — 27000221 HC OXYGEN, UP TO 24 HOURS

## 2021-07-17 PROCEDURE — 93010 ELECTROCARDIOGRAM REPORT: CPT | Mod: ,,, | Performed by: INTERNAL MEDICINE

## 2021-07-17 RX ORDER — IPRATROPIUM BROMIDE AND ALBUTEROL SULFATE 2.5; .5 MG/3ML; MG/3ML
3 SOLUTION RESPIRATORY (INHALATION)
Status: COMPLETED | OUTPATIENT
Start: 2021-07-18 | End: 2021-07-18

## 2021-07-17 RX ORDER — IBUPROFEN 800 MG/1
800 TABLET ORAL
Status: COMPLETED | OUTPATIENT
Start: 2021-07-17 | End: 2021-07-17

## 2021-07-17 RX ORDER — IBUPROFEN 800 MG/1
TABLET ORAL
Status: DISCONTINUED
Start: 2021-07-17 | End: 2021-07-22 | Stop reason: HOSPADM

## 2021-07-17 RX ORDER — ACETAMINOPHEN 500 MG
TABLET ORAL
Status: DISCONTINUED
Start: 2021-07-17 | End: 2021-07-22 | Stop reason: HOSPADM

## 2021-07-17 RX ORDER — ACETAMINOPHEN 500 MG
1000 TABLET ORAL
Status: COMPLETED | OUTPATIENT
Start: 2021-07-17 | End: 2021-07-17

## 2021-07-17 RX ADMIN — ACETAMINOPHEN 1000 MG: 500 TABLET ORAL at 09:07

## 2021-07-17 RX ADMIN — IBUPROFEN 800 MG: 800 TABLET ORAL at 09:07

## 2021-07-18 PROBLEM — J42 ACUTE EXACERBATION OF CHRONIC BRONCHITIS: Status: ACTIVE | Noted: 2021-07-18

## 2021-07-18 PROBLEM — J20.9 ACUTE EXACERBATION OF CHRONIC BRONCHITIS: Status: ACTIVE | Noted: 2021-07-18

## 2021-07-18 PROBLEM — R79.89 ELEVATED TROPONIN: Status: ACTIVE | Noted: 2021-07-18

## 2021-07-18 PROBLEM — R09.02 HYPOXIA: Status: ACTIVE | Noted: 2021-07-18

## 2021-07-18 PROBLEM — L89.151 PRESSURE INJURY OF SACRAL REGION, STAGE 1: Status: ACTIVE | Noted: 2021-07-18

## 2021-07-18 LAB
ALBUMIN SERPL BCP-MCNC: 2.8 G/DL (ref 3.5–5.2)
ALP SERPL-CCNC: 73 U/L (ref 55–135)
ALT SERPL W/O P-5'-P-CCNC: 10 U/L (ref 10–44)
ANION GAP SERPL CALC-SCNC: 13 MMOL/L (ref 8–16)
AST SERPL-CCNC: 16 U/L (ref 10–40)
BASOPHILS # BLD AUTO: 0 K/UL (ref 0–0.2)
BASOPHILS NFR BLD: 0 % (ref 0–1.9)
BILIRUB SERPL-MCNC: 0.3 MG/DL (ref 0.1–1)
BUN SERPL-MCNC: 37 MG/DL (ref 8–23)
CALCIUM SERPL-MCNC: 9.4 MG/DL (ref 8.7–10.5)
CHLORIDE SERPL-SCNC: 98 MMOL/L (ref 95–110)
CO2 SERPL-SCNC: 26 MMOL/L (ref 23–29)
CREAT SERPL-MCNC: 1.2 MG/DL (ref 0.5–1.4)
DIFFERENTIAL METHOD: ABNORMAL
EOSINOPHIL # BLD AUTO: 0 K/UL (ref 0–0.5)
EOSINOPHIL NFR BLD: 0 % (ref 0–8)
ERYTHROCYTE [DISTWIDTH] IN BLOOD BY AUTOMATED COUNT: 15.6 % (ref 11.5–14.5)
EST. GFR  (AFRICAN AMERICAN): 46 ML/MIN/1.73 M^2
EST. GFR  (NON AFRICAN AMERICAN): 40 ML/MIN/1.73 M^2
GLUCOSE SERPL-MCNC: 144 MG/DL (ref 70–110)
HCT VFR BLD AUTO: 34.4 % (ref 37–48.5)
HGB BLD-MCNC: 11.2 G/DL (ref 12–16)
IMM GRANULOCYTES # BLD AUTO: 0.02 K/UL (ref 0–0.04)
IMM GRANULOCYTES NFR BLD AUTO: 0.3 % (ref 0–0.5)
LYMPHOCYTES # BLD AUTO: 0.5 K/UL (ref 1–4.8)
LYMPHOCYTES NFR BLD: 8.8 % (ref 18–48)
MCH RBC QN AUTO: 31.4 PG (ref 27–31)
MCHC RBC AUTO-ENTMCNC: 32.6 G/DL (ref 32–36)
MCV RBC AUTO: 96 FL (ref 82–98)
MONOCYTES # BLD AUTO: 0.4 K/UL (ref 0.3–1)
MONOCYTES NFR BLD: 5.9 % (ref 4–15)
NEUTROPHILS # BLD AUTO: 5.1 K/UL (ref 1.8–7.7)
NEUTROPHILS NFR BLD: 85 % (ref 38–73)
NRBC BLD-RTO: 0 /100 WBC
PLATELET # BLD AUTO: 226 K/UL (ref 150–450)
PMV BLD AUTO: 9.5 FL (ref 9.2–12.9)
POTASSIUM SERPL-SCNC: 3.4 MMOL/L (ref 3.5–5.1)
PROT SERPL-MCNC: 7.1 G/DL (ref 6–8.4)
RBC # BLD AUTO: 3.57 M/UL (ref 4–5.4)
SODIUM SERPL-SCNC: 137 MMOL/L (ref 136–145)
TROPONIN I SERPL DL<=0.01 NG/ML-MCNC: 0.05 NG/ML (ref 0–0.03)
WBC # BLD AUTO: 5.94 K/UL (ref 3.9–12.7)

## 2021-07-18 PROCEDURE — 99223 1ST HOSP IP/OBS HIGH 75: CPT | Mod: AI,,, | Performed by: FAMILY MEDICINE

## 2021-07-18 PROCEDURE — 63600175 PHARM REV CODE 636 W HCPCS: Performed by: FAMILY MEDICINE

## 2021-07-18 PROCEDURE — 80053 COMPREHEN METABOLIC PANEL: CPT | Performed by: FAMILY MEDICINE

## 2021-07-18 PROCEDURE — 93010 ELECTROCARDIOGRAM REPORT: CPT | Mod: ,,, | Performed by: INTERNAL MEDICINE

## 2021-07-18 PROCEDURE — 11000001 HC ACUTE MED/SURG PRIVATE ROOM

## 2021-07-18 PROCEDURE — 99900031 HC PATIENT EDUCATION (STAT)

## 2021-07-18 PROCEDURE — 25000242 PHARM REV CODE 250 ALT 637 W/ HCPCS: Performed by: FAMILY MEDICINE

## 2021-07-18 PROCEDURE — 25000003 PHARM REV CODE 250: Performed by: FAMILY MEDICINE

## 2021-07-18 PROCEDURE — 93010 EKG 12-LEAD: ICD-10-PCS | Mod: ,,, | Performed by: INTERNAL MEDICINE

## 2021-07-18 PROCEDURE — 94640 AIRWAY INHALATION TREATMENT: CPT

## 2021-07-18 PROCEDURE — 36415 COLL VENOUS BLD VENIPUNCTURE: CPT | Performed by: FAMILY MEDICINE

## 2021-07-18 PROCEDURE — 94664 DEMO&/EVAL PT USE INHALER: CPT

## 2021-07-18 PROCEDURE — 84484 ASSAY OF TROPONIN QUANT: CPT | Performed by: FAMILY MEDICINE

## 2021-07-18 PROCEDURE — 93005 ELECTROCARDIOGRAM TRACING: CPT

## 2021-07-18 PROCEDURE — 27000646 HC AEROBIKA DEVICE

## 2021-07-18 PROCEDURE — 85025 COMPLETE CBC W/AUTO DIFF WBC: CPT | Performed by: FAMILY MEDICINE

## 2021-07-18 PROCEDURE — 99900035 HC TECH TIME PER 15 MIN (STAT)

## 2021-07-18 PROCEDURE — 99223 PR INITIAL HOSPITAL CARE,LEVL III: ICD-10-PCS | Mod: AI,,, | Performed by: FAMILY MEDICINE

## 2021-07-18 PROCEDURE — 27000221 HC OXYGEN, UP TO 24 HOURS

## 2021-07-18 PROCEDURE — 94761 N-INVAS EAR/PLS OXIMETRY MLT: CPT

## 2021-07-18 PROCEDURE — 63700000 PHARM REV CODE 250 ALT 637 W/O HCPCS: Performed by: FAMILY MEDICINE

## 2021-07-18 RX ORDER — METHYLPREDNISOLONE SOD SUCC 125 MG
62.5 VIAL (EA) INJECTION EVERY 6 HOURS
Status: DISCONTINUED | OUTPATIENT
Start: 2021-07-18 | End: 2021-07-19

## 2021-07-18 RX ORDER — TALC
6 POWDER (GRAM) TOPICAL NIGHTLY PRN
Status: DISCONTINUED | OUTPATIENT
Start: 2021-07-18 | End: 2021-07-22 | Stop reason: HOSPADM

## 2021-07-18 RX ORDER — FAMOTIDINE 10 MG/ML
20 INJECTION INTRAVENOUS DAILY
Status: DISCONTINUED | OUTPATIENT
Start: 2021-07-18 | End: 2021-07-22 | Stop reason: HOSPADM

## 2021-07-18 RX ORDER — ATORVASTATIN CALCIUM 10 MG/1
10 TABLET, FILM COATED ORAL DAILY
Status: DISCONTINUED | OUTPATIENT
Start: 2021-07-18 | End: 2021-07-22 | Stop reason: HOSPADM

## 2021-07-18 RX ORDER — PRIMIDONE 50 MG/1
50 TABLET ORAL 3 TIMES DAILY
Status: DISCONTINUED | OUTPATIENT
Start: 2021-07-18 | End: 2021-07-22 | Stop reason: HOSPADM

## 2021-07-18 RX ORDER — ASPIRIN 81 MG/1
81 TABLET ORAL DAILY
Status: DISCONTINUED | OUTPATIENT
Start: 2021-07-18 | End: 2021-07-22 | Stop reason: HOSPADM

## 2021-07-18 RX ORDER — AZITHROMYCIN 250 MG/1
500 TABLET, FILM COATED ORAL
Status: COMPLETED | OUTPATIENT
Start: 2021-07-18 | End: 2021-07-18

## 2021-07-18 RX ORDER — SODIUM CHLORIDE 0.9 % (FLUSH) 0.9 %
10 SYRINGE (ML) INJECTION
Status: DISCONTINUED | OUTPATIENT
Start: 2021-07-18 | End: 2021-07-22 | Stop reason: HOSPADM

## 2021-07-18 RX ORDER — ACETAMINOPHEN 325 MG/1
650 TABLET ORAL EVERY 8 HOURS PRN
Status: DISCONTINUED | OUTPATIENT
Start: 2021-07-18 | End: 2021-07-22 | Stop reason: HOSPADM

## 2021-07-18 RX ORDER — METOPROLOL SUCCINATE 25 MG/1
25 TABLET, EXTENDED RELEASE ORAL DAILY
Status: DISCONTINUED | OUTPATIENT
Start: 2021-07-18 | End: 2021-07-22 | Stop reason: HOSPADM

## 2021-07-18 RX ORDER — METHYLPREDNISOLONE SOD SUCC 125 MG
125 VIAL (EA) INJECTION
Status: COMPLETED | OUTPATIENT
Start: 2021-07-18 | End: 2021-07-18

## 2021-07-18 RX ORDER — AZITHROMYCIN 250 MG/1
500 TABLET, FILM COATED ORAL DAILY
Status: DISCONTINUED | OUTPATIENT
Start: 2021-07-19 | End: 2021-07-22 | Stop reason: HOSPADM

## 2021-07-18 RX ORDER — IPRATROPIUM BROMIDE AND ALBUTEROL SULFATE 2.5; .5 MG/3ML; MG/3ML
3 SOLUTION RESPIRATORY (INHALATION) EVERY 6 HOURS PRN
Status: DISCONTINUED | OUTPATIENT
Start: 2021-07-18 | End: 2021-07-18

## 2021-07-18 RX ORDER — CITALOPRAM 10 MG/1
10 TABLET ORAL DAILY
Status: DISCONTINUED | OUTPATIENT
Start: 2021-07-18 | End: 2021-07-22 | Stop reason: HOSPADM

## 2021-07-18 RX ORDER — METHYLPREDNISOLONE SOD SUCC 125 MG
62.5 VIAL (EA) INJECTION 4 TIMES DAILY
Status: DISCONTINUED | OUTPATIENT
Start: 2021-07-18 | End: 2021-07-18

## 2021-07-18 RX ORDER — ONDANSETRON 2 MG/ML
4 INJECTION INTRAMUSCULAR; INTRAVENOUS EVERY 8 HOURS PRN
Status: DISCONTINUED | OUTPATIENT
Start: 2021-07-18 | End: 2021-07-22 | Stop reason: HOSPADM

## 2021-07-18 RX ORDER — IPRATROPIUM BROMIDE AND ALBUTEROL SULFATE 2.5; .5 MG/3ML; MG/3ML
3 SOLUTION RESPIRATORY (INHALATION) EVERY 6 HOURS
Status: DISCONTINUED | OUTPATIENT
Start: 2021-07-18 | End: 2021-07-22 | Stop reason: HOSPADM

## 2021-07-18 RX ADMIN — METHYLPREDNISOLONE SODIUM SUCCINATE 125 MG: 125 INJECTION, POWDER, FOR SOLUTION INTRAMUSCULAR; INTRAVENOUS at 01:07

## 2021-07-18 RX ADMIN — AZITHROMYCIN MONOHYDRATE 500 MG: 250 TABLET ORAL at 01:07

## 2021-07-18 RX ADMIN — CEFTRIAXONE 1 G: 1 INJECTION, SOLUTION INTRAVENOUS at 01:07

## 2021-07-18 RX ADMIN — METHYLPREDNISOLONE SODIUM SUCCINATE 62.5 MG: 125 INJECTION, POWDER, FOR SOLUTION INTRAMUSCULAR; INTRAVENOUS at 03:07

## 2021-07-18 RX ADMIN — PRIMIDONE 50 MG: 50 TABLET ORAL at 03:07

## 2021-07-18 RX ADMIN — IPRATROPIUM BROMIDE AND ALBUTEROL SULFATE 3 ML: 2.5; .5 SOLUTION RESPIRATORY (INHALATION) at 01:07

## 2021-07-18 RX ADMIN — FAMOTIDINE 20 MG: 10 INJECTION, SOLUTION INTRAVENOUS at 10:07

## 2021-07-18 RX ADMIN — ASPIRIN 81 MG: 81 TABLET, COATED ORAL at 10:07

## 2021-07-18 RX ADMIN — MELATONIN TAB 3 MG 6 MG: 3 TAB at 09:07

## 2021-07-18 RX ADMIN — PRIMIDONE 50 MG: 50 TABLET ORAL at 09:07

## 2021-07-18 RX ADMIN — ATORVASTATIN CALCIUM 10 MG: 10 TABLET, FILM COATED ORAL at 10:07

## 2021-07-18 RX ADMIN — IPRATROPIUM BROMIDE AND ALBUTEROL SULFATE 3 ML: 2.5; .5 SOLUTION RESPIRATORY (INHALATION) at 07:07

## 2021-07-18 RX ADMIN — IPRATROPIUM BROMIDE AND ALBUTEROL SULFATE 3 ML: 2.5; .5 SOLUTION RESPIRATORY (INHALATION) at 12:07

## 2021-07-18 RX ADMIN — IPRATROPIUM BROMIDE AND ALBUTEROL SULFATE 3 ML: 2.5; .5 SOLUTION RESPIRATORY (INHALATION) at 03:07

## 2021-07-18 RX ADMIN — METHYLPREDNISOLONE SODIUM SUCCINATE 62.5 MG: 125 INJECTION, POWDER, FOR SOLUTION INTRAMUSCULAR; INTRAVENOUS at 08:07

## 2021-07-18 RX ADMIN — METOPROLOL SUCCINATE 25 MG: 25 TABLET, EXTENDED RELEASE ORAL at 10:07

## 2021-07-18 RX ADMIN — CITALOPRAM HYDROBROMIDE 10 MG: 10 TABLET ORAL at 10:07

## 2021-07-18 RX ADMIN — IPRATROPIUM BROMIDE AND ALBUTEROL SULFATE 3 ML: 2.5; .5 SOLUTION RESPIRATORY (INHALATION) at 06:07

## 2021-07-19 LAB
ALBUMIN SERPL BCP-MCNC: 2.6 G/DL (ref 3.5–5.2)
ALP SERPL-CCNC: 75 U/L (ref 55–135)
ALT SERPL W/O P-5'-P-CCNC: 10 U/L (ref 10–44)
ANION GAP SERPL CALC-SCNC: 11 MMOL/L (ref 8–16)
AST SERPL-CCNC: 13 U/L (ref 10–40)
BASOPHILS # BLD AUTO: 0 K/UL (ref 0–0.2)
BASOPHILS NFR BLD: 0 % (ref 0–1.9)
BILIRUB SERPL-MCNC: 0.2 MG/DL (ref 0.1–1)
BUN SERPL-MCNC: 33 MG/DL (ref 8–23)
CALCIUM SERPL-MCNC: 9.2 MG/DL (ref 8.7–10.5)
CHLORIDE SERPL-SCNC: 98 MMOL/L (ref 95–110)
CO2 SERPL-SCNC: 28 MMOL/L (ref 23–29)
CREAT SERPL-MCNC: 1 MG/DL (ref 0.5–1.4)
DIFFERENTIAL METHOD: ABNORMAL
EOSINOPHIL # BLD AUTO: 0 K/UL (ref 0–0.5)
EOSINOPHIL NFR BLD: 0 % (ref 0–8)
ERYTHROCYTE [DISTWIDTH] IN BLOOD BY AUTOMATED COUNT: 15.5 % (ref 11.5–14.5)
EST. GFR  (AFRICAN AMERICAN): 57 ML/MIN/1.73 M^2
EST. GFR  (NON AFRICAN AMERICAN): 50 ML/MIN/1.73 M^2
GLUCOSE SERPL-MCNC: 156 MG/DL (ref 70–110)
HCT VFR BLD AUTO: 32.1 % (ref 37–48.5)
HGB BLD-MCNC: 10.5 G/DL (ref 12–16)
IMM GRANULOCYTES # BLD AUTO: 0.03 K/UL (ref 0–0.04)
IMM GRANULOCYTES NFR BLD AUTO: 0.5 % (ref 0–0.5)
LYMPHOCYTES # BLD AUTO: 0.5 K/UL (ref 1–4.8)
LYMPHOCYTES NFR BLD: 9.1 % (ref 18–48)
MCH RBC QN AUTO: 30.9 PG (ref 27–31)
MCHC RBC AUTO-ENTMCNC: 32.7 G/DL (ref 32–36)
MCV RBC AUTO: 94 FL (ref 82–98)
MONOCYTES # BLD AUTO: 0.4 K/UL (ref 0.3–1)
MONOCYTES NFR BLD: 6.4 % (ref 4–15)
NEUTROPHILS # BLD AUTO: 4.6 K/UL (ref 1.8–7.7)
NEUTROPHILS NFR BLD: 84 % (ref 38–73)
NRBC BLD-RTO: 0 /100 WBC
PLATELET # BLD AUTO: 234 K/UL (ref 150–450)
PMV BLD AUTO: 9.3 FL (ref 9.2–12.9)
POTASSIUM SERPL-SCNC: 3.9 MMOL/L (ref 3.5–5.1)
PROT SERPL-MCNC: 6.7 G/DL (ref 6–8.4)
RBC # BLD AUTO: 3.4 M/UL (ref 4–5.4)
SODIUM SERPL-SCNC: 137 MMOL/L (ref 136–145)
WBC # BLD AUTO: 5.51 K/UL (ref 3.9–12.7)

## 2021-07-19 PROCEDURE — 97162 PT EVAL MOD COMPLEX 30 MIN: CPT

## 2021-07-19 PROCEDURE — 94761 N-INVAS EAR/PLS OXIMETRY MLT: CPT

## 2021-07-19 PROCEDURE — 25000242 PHARM REV CODE 250 ALT 637 W/ HCPCS: Performed by: FAMILY MEDICINE

## 2021-07-19 PROCEDURE — 94640 AIRWAY INHALATION TREATMENT: CPT

## 2021-07-19 PROCEDURE — 99233 PR SUBSEQUENT HOSPITAL CARE,LEVL III: ICD-10-PCS | Mod: ,,, | Performed by: INTERNAL MEDICINE

## 2021-07-19 PROCEDURE — 36415 COLL VENOUS BLD VENIPUNCTURE: CPT | Performed by: FAMILY MEDICINE

## 2021-07-19 PROCEDURE — 85025 COMPLETE CBC W/AUTO DIFF WBC: CPT | Performed by: FAMILY MEDICINE

## 2021-07-19 PROCEDURE — 11000001 HC ACUTE MED/SURG PRIVATE ROOM

## 2021-07-19 PROCEDURE — 80053 COMPREHEN METABOLIC PANEL: CPT | Performed by: FAMILY MEDICINE

## 2021-07-19 PROCEDURE — 25000003 PHARM REV CODE 250: Performed by: FAMILY MEDICINE

## 2021-07-19 PROCEDURE — 63600175 PHARM REV CODE 636 W HCPCS: Performed by: FAMILY MEDICINE

## 2021-07-19 PROCEDURE — 63600175 PHARM REV CODE 636 W HCPCS: Performed by: NURSE PRACTITIONER

## 2021-07-19 PROCEDURE — 25000003 PHARM REV CODE 250: Performed by: NURSE PRACTITIONER

## 2021-07-19 PROCEDURE — 63700000 PHARM REV CODE 250 ALT 637 W/O HCPCS: Performed by: FAMILY MEDICINE

## 2021-07-19 PROCEDURE — 99233 SBSQ HOSP IP/OBS HIGH 50: CPT | Mod: ,,, | Performed by: INTERNAL MEDICINE

## 2021-07-19 PROCEDURE — 94664 DEMO&/EVAL PT USE INHALER: CPT

## 2021-07-19 PROCEDURE — 99900035 HC TECH TIME PER 15 MIN (STAT)

## 2021-07-19 PROCEDURE — 27000221 HC OXYGEN, UP TO 24 HOURS

## 2021-07-19 RX ORDER — BENZONATATE 100 MG/1
100 CAPSULE ORAL 3 TIMES DAILY PRN
Status: DISCONTINUED | OUTPATIENT
Start: 2021-07-19 | End: 2021-07-22 | Stop reason: HOSPADM

## 2021-07-19 RX ORDER — ENOXAPARIN SODIUM 100 MG/ML
40 INJECTION SUBCUTANEOUS EVERY 24 HOURS
Status: DISCONTINUED | OUTPATIENT
Start: 2021-07-19 | End: 2021-07-22 | Stop reason: HOSPADM

## 2021-07-19 RX ADMIN — METHYLPREDNISOLONE SODIUM SUCCINATE 40 MG: 40 INJECTION, POWDER, FOR SOLUTION INTRAMUSCULAR; INTRAVENOUS at 02:07

## 2021-07-19 RX ADMIN — CEFTRIAXONE 1 G: 1 INJECTION, SOLUTION INTRAVENOUS at 12:07

## 2021-07-19 RX ADMIN — METOPROLOL SUCCINATE 25 MG: 25 TABLET, EXTENDED RELEASE ORAL at 07:07

## 2021-07-19 RX ADMIN — IPRATROPIUM BROMIDE AND ALBUTEROL SULFATE 3 ML: 2.5; .5 SOLUTION RESPIRATORY (INHALATION) at 07:07

## 2021-07-19 RX ADMIN — FAMOTIDINE 20 MG: 10 INJECTION, SOLUTION INTRAVENOUS at 07:07

## 2021-07-19 RX ADMIN — ENOXAPARIN SODIUM 40 MG: 40 INJECTION SUBCUTANEOUS at 05:07

## 2021-07-19 RX ADMIN — PRIMIDONE 50 MG: 50 TABLET ORAL at 07:07

## 2021-07-19 RX ADMIN — METHYLPREDNISOLONE SODIUM SUCCINATE 40 MG: 40 INJECTION, POWDER, FOR SOLUTION INTRAMUSCULAR; INTRAVENOUS at 06:07

## 2021-07-19 RX ADMIN — ATORVASTATIN CALCIUM 10 MG: 10 TABLET, FILM COATED ORAL at 07:07

## 2021-07-19 RX ADMIN — PRIMIDONE 50 MG: 50 TABLET ORAL at 02:07

## 2021-07-19 RX ADMIN — CITALOPRAM HYDROBROMIDE 10 MG: 10 TABLET ORAL at 07:07

## 2021-07-19 RX ADMIN — AZITHROMYCIN MONOHYDRATE 500 MG: 250 TABLET ORAL at 09:07

## 2021-07-19 RX ADMIN — METHYLPREDNISOLONE SODIUM SUCCINATE 62.5 MG: 125 INJECTION, POWDER, FOR SOLUTION INTRAMUSCULAR; INTRAVENOUS at 07:07

## 2021-07-19 RX ADMIN — METHYLPREDNISOLONE SODIUM SUCCINATE 62.5 MG: 125 INJECTION, POWDER, FOR SOLUTION INTRAMUSCULAR; INTRAVENOUS at 02:07

## 2021-07-19 RX ADMIN — IPRATROPIUM BROMIDE AND ALBUTEROL SULFATE 3 ML: 2.5; .5 SOLUTION RESPIRATORY (INHALATION) at 12:07

## 2021-07-19 RX ADMIN — PRIMIDONE 50 MG: 50 TABLET ORAL at 08:07

## 2021-07-19 RX ADMIN — ASPIRIN 81 MG: 81 TABLET, COATED ORAL at 07:07

## 2021-07-19 RX ADMIN — IPRATROPIUM BROMIDE AND ALBUTEROL SULFATE 3 ML: 2.5; .5 SOLUTION RESPIRATORY (INHALATION) at 06:07

## 2021-07-19 RX ADMIN — IPRATROPIUM BROMIDE AND ALBUTEROL SULFATE 3 ML: 2.5; .5 SOLUTION RESPIRATORY (INHALATION) at 01:07

## 2021-07-19 RX ADMIN — BENZONATATE 100 MG: 100 CAPSULE, LIQUID FILLED ORAL at 11:07

## 2021-07-20 LAB
ALBUMIN SERPL BCP-MCNC: 2.7 G/DL (ref 3.5–5.2)
ALP SERPL-CCNC: 81 U/L (ref 55–135)
ALT SERPL W/O P-5'-P-CCNC: 12 U/L (ref 10–44)
ANION GAP SERPL CALC-SCNC: 11 MMOL/L (ref 8–16)
AST SERPL-CCNC: 15 U/L (ref 10–40)
BASOPHILS # BLD AUTO: 0.01 K/UL (ref 0–0.2)
BASOPHILS NFR BLD: 0.1 % (ref 0–1.9)
BILIRUB SERPL-MCNC: 0.2 MG/DL (ref 0.1–1)
BUN SERPL-MCNC: 34 MG/DL (ref 8–23)
CALCIUM SERPL-MCNC: 9.3 MG/DL (ref 8.7–10.5)
CHLORIDE SERPL-SCNC: 100 MMOL/L (ref 95–110)
CO2 SERPL-SCNC: 27 MMOL/L (ref 23–29)
CREAT SERPL-MCNC: 0.8 MG/DL (ref 0.5–1.4)
DIFFERENTIAL METHOD: ABNORMAL
EOSINOPHIL # BLD AUTO: 0 K/UL (ref 0–0.5)
EOSINOPHIL NFR BLD: 0 % (ref 0–8)
ERYTHROCYTE [DISTWIDTH] IN BLOOD BY AUTOMATED COUNT: 15.6 % (ref 11.5–14.5)
EST. GFR  (AFRICAN AMERICAN): >60 ML/MIN/1.73 M^2
EST. GFR  (NON AFRICAN AMERICAN): >60 ML/MIN/1.73 M^2
GLUCOSE SERPL-MCNC: 133 MG/DL (ref 70–110)
HCT VFR BLD AUTO: 33.6 % (ref 37–48.5)
HGB BLD-MCNC: 10.9 G/DL (ref 12–16)
IMM GRANULOCYTES # BLD AUTO: 0.04 K/UL (ref 0–0.04)
IMM GRANULOCYTES NFR BLD AUTO: 0.5 % (ref 0–0.5)
LYMPHOCYTES # BLD AUTO: 0.7 K/UL (ref 1–4.8)
LYMPHOCYTES NFR BLD: 9.1 % (ref 18–48)
MCH RBC QN AUTO: 30.9 PG (ref 27–31)
MCHC RBC AUTO-ENTMCNC: 32.4 G/DL (ref 32–36)
MCV RBC AUTO: 95 FL (ref 82–98)
MONOCYTES # BLD AUTO: 0.9 K/UL (ref 0.3–1)
MONOCYTES NFR BLD: 11.1 % (ref 4–15)
NEUTROPHILS # BLD AUTO: 6.3 K/UL (ref 1.8–7.7)
NEUTROPHILS NFR BLD: 79.2 % (ref 38–73)
NRBC BLD-RTO: 0 /100 WBC
PLATELET # BLD AUTO: 266 K/UL (ref 150–450)
PMV BLD AUTO: 9.1 FL (ref 9.2–12.9)
POTASSIUM SERPL-SCNC: 3.8 MMOL/L (ref 3.5–5.1)
PROT SERPL-MCNC: 6.7 G/DL (ref 6–8.4)
RBC # BLD AUTO: 3.53 M/UL (ref 4–5.4)
SODIUM SERPL-SCNC: 138 MMOL/L (ref 136–145)
WBC # BLD AUTO: 7.93 K/UL (ref 3.9–12.7)

## 2021-07-20 PROCEDURE — 25000003 PHARM REV CODE 250: Performed by: FAMILY MEDICINE

## 2021-07-20 PROCEDURE — 85025 COMPLETE CBC W/AUTO DIFF WBC: CPT | Performed by: FAMILY MEDICINE

## 2021-07-20 PROCEDURE — 99232 PR SUBSEQUENT HOSPITAL CARE,LEVL II: ICD-10-PCS | Mod: ,,, | Performed by: INTERNAL MEDICINE

## 2021-07-20 PROCEDURE — 25000003 PHARM REV CODE 250: Performed by: NURSE PRACTITIONER

## 2021-07-20 PROCEDURE — 25000242 PHARM REV CODE 250 ALT 637 W/ HCPCS: Performed by: FAMILY MEDICINE

## 2021-07-20 PROCEDURE — 11000001 HC ACUTE MED/SURG PRIVATE ROOM

## 2021-07-20 PROCEDURE — 94640 AIRWAY INHALATION TREATMENT: CPT

## 2021-07-20 PROCEDURE — 63600175 PHARM REV CODE 636 W HCPCS: Performed by: NURSE PRACTITIONER

## 2021-07-20 PROCEDURE — 36415 COLL VENOUS BLD VENIPUNCTURE: CPT | Performed by: FAMILY MEDICINE

## 2021-07-20 PROCEDURE — 94664 DEMO&/EVAL PT USE INHALER: CPT

## 2021-07-20 PROCEDURE — 97530 THERAPEUTIC ACTIVITIES: CPT

## 2021-07-20 PROCEDURE — 80053 COMPREHEN METABOLIC PANEL: CPT | Performed by: FAMILY MEDICINE

## 2021-07-20 PROCEDURE — 63600175 PHARM REV CODE 636 W HCPCS: Performed by: FAMILY MEDICINE

## 2021-07-20 PROCEDURE — 99232 SBSQ HOSP IP/OBS MODERATE 35: CPT | Mod: ,,, | Performed by: INTERNAL MEDICINE

## 2021-07-20 PROCEDURE — 94761 N-INVAS EAR/PLS OXIMETRY MLT: CPT

## 2021-07-20 PROCEDURE — 99900035 HC TECH TIME PER 15 MIN (STAT)

## 2021-07-20 PROCEDURE — 63700000 PHARM REV CODE 250 ALT 637 W/O HCPCS: Performed by: FAMILY MEDICINE

## 2021-07-20 PROCEDURE — 27000221 HC OXYGEN, UP TO 24 HOURS

## 2021-07-20 RX ORDER — PREDNISONE 20 MG/1
40 TABLET ORAL DAILY
Status: DISCONTINUED | OUTPATIENT
Start: 2021-07-20 | End: 2021-07-22 | Stop reason: HOSPADM

## 2021-07-20 RX ORDER — AMLODIPINE BESYLATE 2.5 MG/1
2.5 TABLET ORAL DAILY
Status: DISCONTINUED | OUTPATIENT
Start: 2021-07-20 | End: 2021-07-22 | Stop reason: HOSPADM

## 2021-07-20 RX ORDER — LISINOPRIL 40 MG/1
40 TABLET ORAL DAILY
Status: DISCONTINUED | OUTPATIENT
Start: 2021-07-20 | End: 2021-07-22

## 2021-07-20 RX ADMIN — ENOXAPARIN SODIUM 40 MG: 40 INJECTION SUBCUTANEOUS at 05:07

## 2021-07-20 RX ADMIN — PRIMIDONE 50 MG: 50 TABLET ORAL at 08:07

## 2021-07-20 RX ADMIN — ASPIRIN 81 MG: 81 TABLET, COATED ORAL at 07:07

## 2021-07-20 RX ADMIN — AMLODIPINE BESYLATE 2.5 MG: 2.5 TABLET ORAL at 10:07

## 2021-07-20 RX ADMIN — BENZONATATE 100 MG: 100 CAPSULE, LIQUID FILLED ORAL at 10:07

## 2021-07-20 RX ADMIN — CEFTRIAXONE 1 G: 1 INJECTION, SOLUTION INTRAVENOUS at 01:07

## 2021-07-20 RX ADMIN — LISINOPRIL 40 MG: 40 TABLET ORAL at 10:07

## 2021-07-20 RX ADMIN — METHYLPREDNISOLONE SODIUM SUCCINATE 40 MG: 40 INJECTION, POWDER, FOR SOLUTION INTRAMUSCULAR; INTRAVENOUS at 06:07

## 2021-07-20 RX ADMIN — METHYLPREDNISOLONE SODIUM SUCCINATE 40 MG: 40 INJECTION, POWDER, FOR SOLUTION INTRAMUSCULAR; INTRAVENOUS at 01:07

## 2021-07-20 RX ADMIN — BENZONATATE 100 MG: 100 CAPSULE, LIQUID FILLED ORAL at 08:07

## 2021-07-20 RX ADMIN — IPRATROPIUM BROMIDE AND ALBUTEROL SULFATE 3 ML: 2.5; .5 SOLUTION RESPIRATORY (INHALATION) at 12:07

## 2021-07-20 RX ADMIN — AZITHROMYCIN MONOHYDRATE 500 MG: 250 TABLET ORAL at 07:07

## 2021-07-20 RX ADMIN — IPRATROPIUM BROMIDE AND ALBUTEROL SULFATE 3 ML: 2.5; .5 SOLUTION RESPIRATORY (INHALATION) at 07:07

## 2021-07-20 RX ADMIN — PRIMIDONE 50 MG: 50 TABLET ORAL at 07:07

## 2021-07-20 RX ADMIN — FAMOTIDINE 20 MG: 10 INJECTION, SOLUTION INTRAVENOUS at 07:07

## 2021-07-20 RX ADMIN — ATORVASTATIN CALCIUM 10 MG: 10 TABLET, FILM COATED ORAL at 07:07

## 2021-07-20 RX ADMIN — METOPROLOL SUCCINATE 25 MG: 25 TABLET, EXTENDED RELEASE ORAL at 07:07

## 2021-07-20 RX ADMIN — PRIMIDONE 50 MG: 50 TABLET ORAL at 02:07

## 2021-07-20 RX ADMIN — CITALOPRAM HYDROBROMIDE 10 MG: 10 TABLET ORAL at 07:07

## 2021-07-20 RX ADMIN — IPRATROPIUM BROMIDE AND ALBUTEROL SULFATE 3 ML: 2.5; .5 SOLUTION RESPIRATORY (INHALATION) at 06:07

## 2021-07-21 LAB
ALBUMIN SERPL BCP-MCNC: 2.7 G/DL (ref 3.5–5.2)
ALP SERPL-CCNC: 73 U/L (ref 55–135)
ALT SERPL W/O P-5'-P-CCNC: 13 U/L (ref 10–44)
ANION GAP SERPL CALC-SCNC: 10 MMOL/L (ref 8–16)
AST SERPL-CCNC: 16 U/L (ref 10–40)
BASOPHILS # BLD AUTO: 0.02 K/UL (ref 0–0.2)
BASOPHILS NFR BLD: 0.3 % (ref 0–1.9)
BILIRUB SERPL-MCNC: 0.3 MG/DL (ref 0.1–1)
BUN SERPL-MCNC: 29 MG/DL (ref 8–23)
CALCIUM SERPL-MCNC: 9.4 MG/DL (ref 8.7–10.5)
CHLORIDE SERPL-SCNC: 100 MMOL/L (ref 95–110)
CO2 SERPL-SCNC: 29 MMOL/L (ref 23–29)
CREAT SERPL-MCNC: 0.9 MG/DL (ref 0.5–1.4)
DIFFERENTIAL METHOD: ABNORMAL
EOSINOPHIL # BLD AUTO: 0.1 K/UL (ref 0–0.5)
EOSINOPHIL NFR BLD: 1.5 % (ref 0–8)
ERYTHROCYTE [DISTWIDTH] IN BLOOD BY AUTOMATED COUNT: 15.7 % (ref 11.5–14.5)
EST. GFR  (AFRICAN AMERICAN): >60 ML/MIN/1.73 M^2
EST. GFR  (NON AFRICAN AMERICAN): 56 ML/MIN/1.73 M^2
GLUCOSE SERPL-MCNC: 104 MG/DL (ref 70–110)
HCT VFR BLD AUTO: 34.6 % (ref 37–48.5)
HGB BLD-MCNC: 11 G/DL (ref 12–16)
IMM GRANULOCYTES # BLD AUTO: 0.06 K/UL (ref 0–0.04)
IMM GRANULOCYTES NFR BLD AUTO: 0.8 % (ref 0–0.5)
LYMPHOCYTES # BLD AUTO: 1.5 K/UL (ref 1–4.8)
LYMPHOCYTES NFR BLD: 20.7 % (ref 18–48)
MCH RBC QN AUTO: 30.7 PG (ref 27–31)
MCHC RBC AUTO-ENTMCNC: 31.8 G/DL (ref 32–36)
MCV RBC AUTO: 97 FL (ref 82–98)
MONOCYTES # BLD AUTO: 1 K/UL (ref 0.3–1)
MONOCYTES NFR BLD: 14.2 % (ref 4–15)
NEUTROPHILS # BLD AUTO: 4.5 K/UL (ref 1.8–7.7)
NEUTROPHILS NFR BLD: 62.5 % (ref 38–73)
NRBC BLD-RTO: 0 /100 WBC
PLATELET # BLD AUTO: 276 K/UL (ref 150–450)
PMV BLD AUTO: 9.1 FL (ref 9.2–12.9)
POTASSIUM SERPL-SCNC: 3.6 MMOL/L (ref 3.5–5.1)
PROT SERPL-MCNC: 6.4 G/DL (ref 6–8.4)
RBC # BLD AUTO: 3.58 M/UL (ref 4–5.4)
SODIUM SERPL-SCNC: 139 MMOL/L (ref 136–145)
WBC # BLD AUTO: 7.19 K/UL (ref 3.9–12.7)

## 2021-07-21 PROCEDURE — 36415 COLL VENOUS BLD VENIPUNCTURE: CPT | Performed by: FAMILY MEDICINE

## 2021-07-21 PROCEDURE — 63600175 PHARM REV CODE 636 W HCPCS: Performed by: FAMILY MEDICINE

## 2021-07-21 PROCEDURE — 94761 N-INVAS EAR/PLS OXIMETRY MLT: CPT

## 2021-07-21 PROCEDURE — 97530 THERAPEUTIC ACTIVITIES: CPT

## 2021-07-21 PROCEDURE — 11000001 HC ACUTE MED/SURG PRIVATE ROOM

## 2021-07-21 PROCEDURE — 27000221 HC OXYGEN, UP TO 24 HOURS

## 2021-07-21 PROCEDURE — 99900035 HC TECH TIME PER 15 MIN (STAT)

## 2021-07-21 PROCEDURE — 85025 COMPLETE CBC W/AUTO DIFF WBC: CPT | Performed by: FAMILY MEDICINE

## 2021-07-21 PROCEDURE — 63600175 PHARM REV CODE 636 W HCPCS: Performed by: NURSE PRACTITIONER

## 2021-07-21 PROCEDURE — 25000003 PHARM REV CODE 250: Performed by: FAMILY MEDICINE

## 2021-07-21 PROCEDURE — 94640 AIRWAY INHALATION TREATMENT: CPT

## 2021-07-21 PROCEDURE — 94664 DEMO&/EVAL PT USE INHALER: CPT

## 2021-07-21 PROCEDURE — 91300 PHARM REV CODE 636 W HCPCS: CPT | Performed by: FAMILY MEDICINE

## 2021-07-21 PROCEDURE — 25000003 PHARM REV CODE 250: Performed by: NURSE PRACTITIONER

## 2021-07-21 PROCEDURE — 25000242 PHARM REV CODE 250 ALT 637 W/ HCPCS: Performed by: FAMILY MEDICINE

## 2021-07-21 PROCEDURE — 0001A HC IMMUNIZ ADMIN, SARS-COV-2 COVID-19 VACC, 30MCG/0.3ML, 1ST DOSE: CPT | Performed by: FAMILY MEDICINE

## 2021-07-21 PROCEDURE — 80053 COMPREHEN METABOLIC PANEL: CPT | Performed by: FAMILY MEDICINE

## 2021-07-21 PROCEDURE — 99232 SBSQ HOSP IP/OBS MODERATE 35: CPT | Mod: ,,, | Performed by: FAMILY MEDICINE

## 2021-07-21 PROCEDURE — 63700000 PHARM REV CODE 250 ALT 637 W/O HCPCS: Performed by: FAMILY MEDICINE

## 2021-07-21 PROCEDURE — 99232 PR SUBSEQUENT HOSPITAL CARE,LEVL II: ICD-10-PCS | Mod: ,,, | Performed by: FAMILY MEDICINE

## 2021-07-21 RX ORDER — SPIRONOLACTONE 25 MG/1
25 TABLET ORAL DAILY
Status: DISCONTINUED | OUTPATIENT
Start: 2021-07-21 | End: 2021-07-22 | Stop reason: HOSPADM

## 2021-07-21 RX ORDER — GUAIFENESIN 600 MG/1
600 TABLET, EXTENDED RELEASE ORAL 2 TIMES DAILY
Status: DISCONTINUED | OUTPATIENT
Start: 2021-07-21 | End: 2021-07-22 | Stop reason: HOSPADM

## 2021-07-21 RX ORDER — HYDROCHLOROTHIAZIDE 25 MG/1
25 TABLET ORAL DAILY
Status: DISCONTINUED | OUTPATIENT
Start: 2021-07-21 | End: 2021-07-22 | Stop reason: HOSPADM

## 2021-07-21 RX ADMIN — AMLODIPINE BESYLATE 2.5 MG: 2.5 TABLET ORAL at 08:07

## 2021-07-21 RX ADMIN — IPRATROPIUM BROMIDE AND ALBUTEROL SULFATE 3 ML: 2.5; .5 SOLUTION RESPIRATORY (INHALATION) at 06:07

## 2021-07-21 RX ADMIN — ATORVASTATIN CALCIUM 10 MG: 10 TABLET, FILM COATED ORAL at 08:07

## 2021-07-21 RX ADMIN — FAMOTIDINE 20 MG: 10 INJECTION, SOLUTION INTRAVENOUS at 08:07

## 2021-07-21 RX ADMIN — METOPROLOL SUCCINATE 25 MG: 25 TABLET, EXTENDED RELEASE ORAL at 08:07

## 2021-07-21 RX ADMIN — CITALOPRAM HYDROBROMIDE 10 MG: 10 TABLET ORAL at 08:07

## 2021-07-21 RX ADMIN — HYDROCHLOROTHIAZIDE 25 MG: 25 TABLET ORAL at 10:07

## 2021-07-21 RX ADMIN — ACETAMINOPHEN 650 MG: 325 TABLET ORAL at 10:07

## 2021-07-21 RX ADMIN — RNA INGREDIENT BNT-162B2 0.3 ML: 0.23 INJECTION, SUSPENSION INTRAMUSCULAR at 04:07

## 2021-07-21 RX ADMIN — ACETAMINOPHEN 650 MG: 325 TABLET ORAL at 12:07

## 2021-07-21 RX ADMIN — GUAIFENESIN 600 MG: 600 TABLET, EXTENDED RELEASE ORAL at 08:07

## 2021-07-21 RX ADMIN — ASPIRIN 81 MG: 81 TABLET, COATED ORAL at 09:07

## 2021-07-21 RX ADMIN — PRIMIDONE 50 MG: 50 TABLET ORAL at 04:07

## 2021-07-21 RX ADMIN — BENZONATATE 100 MG: 100 CAPSULE, LIQUID FILLED ORAL at 07:07

## 2021-07-21 RX ADMIN — IPRATROPIUM BROMIDE AND ALBUTEROL SULFATE 3 ML: 2.5; .5 SOLUTION RESPIRATORY (INHALATION) at 01:07

## 2021-07-21 RX ADMIN — PRIMIDONE 50 MG: 50 TABLET ORAL at 08:07

## 2021-07-21 RX ADMIN — GUAIFENESIN 600 MG: 600 TABLET, EXTENDED RELEASE ORAL at 09:07

## 2021-07-21 RX ADMIN — AZITHROMYCIN MONOHYDRATE 500 MG: 250 TABLET ORAL at 08:07

## 2021-07-21 RX ADMIN — CEFTRIAXONE 1 G: 1 INJECTION, SOLUTION INTRAVENOUS at 12:07

## 2021-07-21 RX ADMIN — SPIRONOLACTONE 25 MG: 25 TABLET ORAL at 10:07

## 2021-07-21 RX ADMIN — ENOXAPARIN SODIUM 40 MG: 40 INJECTION SUBCUTANEOUS at 04:07

## 2021-07-21 RX ADMIN — IPRATROPIUM BROMIDE AND ALBUTEROL SULFATE 3 ML: 2.5; .5 SOLUTION RESPIRATORY (INHALATION) at 12:07

## 2021-07-21 RX ADMIN — IPRATROPIUM BROMIDE AND ALBUTEROL SULFATE 3 ML: 2.5; .5 SOLUTION RESPIRATORY (INHALATION) at 07:07

## 2021-07-21 RX ADMIN — PRIMIDONE 50 MG: 50 TABLET ORAL at 09:07

## 2021-07-21 RX ADMIN — PREDNISONE 40 MG: 20 TABLET ORAL at 08:07

## 2021-07-21 RX ADMIN — LISINOPRIL 40 MG: 40 TABLET ORAL at 08:07

## 2021-07-22 VITALS
OXYGEN SATURATION: 95 % | RESPIRATION RATE: 18 BRPM | BODY MASS INDEX: 36.95 KG/M2 | DIASTOLIC BLOOD PRESSURE: 79 MMHG | HEART RATE: 65 BPM | WEIGHT: 200.81 LBS | SYSTOLIC BLOOD PRESSURE: 163 MMHG | TEMPERATURE: 97 F | HEIGHT: 62 IN

## 2021-07-22 LAB
ALBUMIN SERPL BCP-MCNC: 2.7 G/DL (ref 3.5–5.2)
ALP SERPL-CCNC: 72 U/L (ref 55–135)
ALT SERPL W/O P-5'-P-CCNC: 16 U/L (ref 10–44)
ANION GAP SERPL CALC-SCNC: 9 MMOL/L (ref 8–16)
AST SERPL-CCNC: 17 U/L (ref 10–40)
BASOPHILS # BLD AUTO: 0.02 K/UL (ref 0–0.2)
BASOPHILS NFR BLD: 0.3 % (ref 0–1.9)
BILIRUB SERPL-MCNC: 0.4 MG/DL (ref 0.1–1)
BUN SERPL-MCNC: 20 MG/DL (ref 8–23)
CALCIUM SERPL-MCNC: 9.7 MG/DL (ref 8.7–10.5)
CHLORIDE SERPL-SCNC: 98 MMOL/L (ref 95–110)
CO2 SERPL-SCNC: 30 MMOL/L (ref 23–29)
CREAT SERPL-MCNC: 0.9 MG/DL (ref 0.5–1.4)
DIFFERENTIAL METHOD: ABNORMAL
EOSINOPHIL # BLD AUTO: 0.2 K/UL (ref 0–0.5)
EOSINOPHIL NFR BLD: 2 % (ref 0–8)
ERYTHROCYTE [DISTWIDTH] IN BLOOD BY AUTOMATED COUNT: 15.6 % (ref 11.5–14.5)
EST. GFR  (AFRICAN AMERICAN): >60 ML/MIN/1.73 M^2
EST. GFR  (NON AFRICAN AMERICAN): 56 ML/MIN/1.73 M^2
GLUCOSE SERPL-MCNC: 107 MG/DL (ref 70–110)
HCT VFR BLD AUTO: 34.5 % (ref 37–48.5)
HGB BLD-MCNC: 11.1 G/DL (ref 12–16)
IMM GRANULOCYTES # BLD AUTO: 0.1 K/UL (ref 0–0.04)
IMM GRANULOCYTES NFR BLD AUTO: 1.3 % (ref 0–0.5)
LYMPHOCYTES # BLD AUTO: 1.6 K/UL (ref 1–4.8)
LYMPHOCYTES NFR BLD: 21.4 % (ref 18–48)
MCH RBC QN AUTO: 30.8 PG (ref 27–31)
MCHC RBC AUTO-ENTMCNC: 32.2 G/DL (ref 32–36)
MCV RBC AUTO: 96 FL (ref 82–98)
MONOCYTES # BLD AUTO: 0.9 K/UL (ref 0.3–1)
MONOCYTES NFR BLD: 11.3 % (ref 4–15)
NEUTROPHILS # BLD AUTO: 4.8 K/UL (ref 1.8–7.7)
NEUTROPHILS NFR BLD: 63.7 % (ref 38–73)
NRBC BLD-RTO: 0 /100 WBC
PLATELET # BLD AUTO: 278 K/UL (ref 150–450)
PMV BLD AUTO: 9 FL (ref 9.2–12.9)
POTASSIUM SERPL-SCNC: 3.6 MMOL/L (ref 3.5–5.1)
PROT SERPL-MCNC: 6.6 G/DL (ref 6–8.4)
RBC # BLD AUTO: 3.6 M/UL (ref 4–5.4)
SODIUM SERPL-SCNC: 137 MMOL/L (ref 136–145)
WBC # BLD AUTO: 7.53 K/UL (ref 3.9–12.7)

## 2021-07-22 PROCEDURE — 85025 COMPLETE CBC W/AUTO DIFF WBC: CPT | Performed by: FAMILY MEDICINE

## 2021-07-22 PROCEDURE — 63700000 PHARM REV CODE 250 ALT 637 W/O HCPCS: Performed by: FAMILY MEDICINE

## 2021-07-22 PROCEDURE — A4216 STERILE WATER/SALINE, 10 ML: HCPCS | Performed by: FAMILY MEDICINE

## 2021-07-22 PROCEDURE — 27000221 HC OXYGEN, UP TO 24 HOURS

## 2021-07-22 PROCEDURE — 63600175 PHARM REV CODE 636 W HCPCS: Performed by: NURSE PRACTITIONER

## 2021-07-22 PROCEDURE — 80053 COMPREHEN METABOLIC PANEL: CPT | Performed by: FAMILY MEDICINE

## 2021-07-22 PROCEDURE — 25000003 PHARM REV CODE 250: Performed by: FAMILY MEDICINE

## 2021-07-22 PROCEDURE — 94664 DEMO&/EVAL PT USE INHALER: CPT

## 2021-07-22 PROCEDURE — 25000242 PHARM REV CODE 250 ALT 637 W/ HCPCS: Performed by: FAMILY MEDICINE

## 2021-07-22 PROCEDURE — 25000003 PHARM REV CODE 250: Performed by: NURSE PRACTITIONER

## 2021-07-22 PROCEDURE — 94640 AIRWAY INHALATION TREATMENT: CPT

## 2021-07-22 PROCEDURE — 99232 SBSQ HOSP IP/OBS MODERATE 35: CPT | Mod: ,,, | Performed by: FAMILY MEDICINE

## 2021-07-22 PROCEDURE — 99232 PR SUBSEQUENT HOSPITAL CARE,LEVL II: ICD-10-PCS | Mod: ,,, | Performed by: FAMILY MEDICINE

## 2021-07-22 PROCEDURE — 63600175 PHARM REV CODE 636 W HCPCS: Performed by: FAMILY MEDICINE

## 2021-07-22 PROCEDURE — 94761 N-INVAS EAR/PLS OXIMETRY MLT: CPT

## 2021-07-22 PROCEDURE — 36415 COLL VENOUS BLD VENIPUNCTURE: CPT | Performed by: FAMILY MEDICINE

## 2021-07-22 RX ORDER — LISINOPRIL 2.5 MG/1
2.5 TABLET ORAL DAILY
Status: DISCONTINUED | OUTPATIENT
Start: 2021-07-23 | End: 2021-07-22 | Stop reason: HOSPADM

## 2021-07-22 RX ORDER — IPRATROPIUM BROMIDE AND ALBUTEROL SULFATE 2.5; .5 MG/3ML; MG/3ML
3 SOLUTION RESPIRATORY (INHALATION) EVERY 6 HOURS PRN
Qty: 1 BOX | Refills: 0 | Status: ON HOLD | OUTPATIENT
Start: 2021-07-22 | End: 2022-05-27 | Stop reason: SDUPTHER

## 2021-07-22 RX ADMIN — CITALOPRAM HYDROBROMIDE 10 MG: 10 TABLET ORAL at 08:07

## 2021-07-22 RX ADMIN — IPRATROPIUM BROMIDE AND ALBUTEROL SULFATE 3 ML: 2.5; .5 SOLUTION RESPIRATORY (INHALATION) at 12:07

## 2021-07-22 RX ADMIN — LISINOPRIL 40 MG: 40 TABLET ORAL at 08:07

## 2021-07-22 RX ADMIN — ASPIRIN 81 MG: 81 TABLET, COATED ORAL at 08:07

## 2021-07-22 RX ADMIN — AMLODIPINE BESYLATE 2.5 MG: 2.5 TABLET ORAL at 08:07

## 2021-07-22 RX ADMIN — IPRATROPIUM BROMIDE AND ALBUTEROL SULFATE 3 ML: 2.5; .5 SOLUTION RESPIRATORY (INHALATION) at 07:07

## 2021-07-22 RX ADMIN — AZITHROMYCIN MONOHYDRATE 500 MG: 250 TABLET ORAL at 08:07

## 2021-07-22 RX ADMIN — METOPROLOL SUCCINATE 25 MG: 25 TABLET, EXTENDED RELEASE ORAL at 08:07

## 2021-07-22 RX ADMIN — PRIMIDONE 50 MG: 50 TABLET ORAL at 03:07

## 2021-07-22 RX ADMIN — IPRATROPIUM BROMIDE AND ALBUTEROL SULFATE 3 ML: 2.5; .5 SOLUTION RESPIRATORY (INHALATION) at 01:07

## 2021-07-22 RX ADMIN — PRIMIDONE 50 MG: 50 TABLET ORAL at 08:07

## 2021-07-22 RX ADMIN — Medication 10 ML: at 01:07

## 2021-07-22 RX ADMIN — GUAIFENESIN 600 MG: 600 TABLET, EXTENDED RELEASE ORAL at 08:07

## 2021-07-22 RX ADMIN — PREDNISONE 40 MG: 20 TABLET ORAL at 08:07

## 2021-07-22 RX ADMIN — SPIRONOLACTONE 25 MG: 25 TABLET ORAL at 08:07

## 2021-07-22 RX ADMIN — HYDROCHLOROTHIAZIDE 25 MG: 25 TABLET ORAL at 08:07

## 2021-07-22 RX ADMIN — ATORVASTATIN CALCIUM 10 MG: 10 TABLET, FILM COATED ORAL at 08:07

## 2021-07-22 RX ADMIN — FAMOTIDINE 20 MG: 10 INJECTION, SOLUTION INTRAVENOUS at 08:07

## 2021-07-22 RX ADMIN — CEFTRIAXONE 1 G: 1 INJECTION, SOLUTION INTRAVENOUS at 01:07

## 2021-07-23 ENCOUNTER — TELEPHONE (OUTPATIENT)
Dept: FAMILY MEDICINE | Facility: CLINIC | Age: 86
End: 2021-07-23

## 2021-07-23 DIAGNOSIS — J44.1 COPD EXACERBATION: Primary | ICD-10-CM

## 2021-07-23 LAB
BACTERIA BLD CULT: NORMAL
BACTERIA BLD CULT: NORMAL

## 2021-07-23 PROCEDURE — G0180 PR HOME HEALTH MD CERTIFICATION: ICD-10-PCS | Mod: ,,, | Performed by: FAMILY MEDICINE

## 2021-07-23 PROCEDURE — G0180 MD CERTIFICATION HHA PATIENT: HCPCS | Mod: ,,, | Performed by: FAMILY MEDICINE

## 2021-07-24 ENCOUNTER — PATIENT OUTREACH (OUTPATIENT)
Dept: ADMINISTRATIVE | Facility: CLINIC | Age: 86
End: 2021-07-24

## 2021-07-29 ENCOUNTER — OFFICE VISIT (OUTPATIENT)
Dept: INTERNAL MEDICINE | Facility: CLINIC | Age: 86
End: 2021-07-29
Payer: MEDICARE

## 2021-07-29 VITALS
WEIGHT: 190.5 LBS | HEIGHT: 60 IN | DIASTOLIC BLOOD PRESSURE: 80 MMHG | OXYGEN SATURATION: 98 % | RESPIRATION RATE: 18 BRPM | HEART RATE: 76 BPM | BODY MASS INDEX: 37.4 KG/M2 | SYSTOLIC BLOOD PRESSURE: 126 MMHG

## 2021-07-29 DIAGNOSIS — J40 BRONCHITIS: Primary | ICD-10-CM

## 2021-07-29 PROCEDURE — 99214 OFFICE O/P EST MOD 30 MIN: CPT | Mod: S$PBB,,, | Performed by: NURSE PRACTITIONER

## 2021-07-29 PROCEDURE — 99999 PR PBB SHADOW E&M-EST. PATIENT-LVL IV: ICD-10-PCS | Mod: PBBFAC,,, | Performed by: NURSE PRACTITIONER

## 2021-07-29 PROCEDURE — 99214 OFFICE O/P EST MOD 30 MIN: CPT | Mod: PBBFAC,PN | Performed by: NURSE PRACTITIONER

## 2021-07-29 PROCEDURE — 99999 PR PBB SHADOW E&M-EST. PATIENT-LVL IV: CPT | Mod: PBBFAC,,, | Performed by: NURSE PRACTITIONER

## 2021-07-29 PROCEDURE — 99214 PR OFFICE/OUTPT VISIT, EST, LEVL IV, 30-39 MIN: ICD-10-PCS | Mod: S$PBB,,, | Performed by: NURSE PRACTITIONER

## 2021-07-30 DIAGNOSIS — R60.9 DEPENDENT EDEMA: ICD-10-CM

## 2021-07-30 DIAGNOSIS — I10 ESSENTIAL HYPERTENSION: ICD-10-CM

## 2021-07-30 DIAGNOSIS — F32.A DEPRESSION, UNSPECIFIED DEPRESSION TYPE: ICD-10-CM

## 2021-07-30 DIAGNOSIS — R25.1 TREMOR: ICD-10-CM

## 2021-07-30 RX ORDER — PRIMIDONE 50 MG/1
TABLET ORAL
Qty: 270 TABLET | Refills: 1 | Status: SHIPPED | OUTPATIENT
Start: 2021-07-30 | End: 2022-01-11

## 2021-07-30 RX ORDER — AMLODIPINE BESYLATE 2.5 MG/1
TABLET ORAL
Qty: 90 TABLET | Refills: 1 | Status: SHIPPED | OUTPATIENT
Start: 2021-07-30 | End: 2022-01-11

## 2021-07-30 RX ORDER — SPIRONOLACTONE AND HYDROCHLOROTHIAZIDE 25; 25 MG/1; MG/1
TABLET ORAL
Qty: 90 TABLET | Refills: 1 | Status: SHIPPED | OUTPATIENT
Start: 2021-07-30 | End: 2022-01-30

## 2021-07-30 RX ORDER — CITALOPRAM 10 MG/1
TABLET ORAL
Qty: 90 TABLET | Refills: 1 | Status: SHIPPED | OUTPATIENT
Start: 2021-07-30 | End: 2022-01-11

## 2021-08-06 ENCOUNTER — EXTERNAL HOME HEALTH (OUTPATIENT)
Dept: HOME HEALTH SERVICES | Facility: HOSPITAL | Age: 86
End: 2021-08-06
Payer: MEDICARE

## 2021-08-18 ENCOUNTER — OFFICE VISIT (OUTPATIENT)
Dept: NEUROLOGY | Facility: CLINIC | Age: 86
End: 2021-08-18
Payer: MEDICARE

## 2021-08-18 VITALS
DIASTOLIC BLOOD PRESSURE: 60 MMHG | SYSTOLIC BLOOD PRESSURE: 108 MMHG | HEIGHT: 60 IN | OXYGEN SATURATION: 95 % | BODY MASS INDEX: 36.92 KG/M2 | HEART RATE: 82 BPM | WEIGHT: 188.06 LBS | RESPIRATION RATE: 16 BRPM

## 2021-08-18 DIAGNOSIS — R25.1 TREMOR: Primary | ICD-10-CM

## 2021-08-18 DIAGNOSIS — I69.90 LATE EFFECTS OF CVA (CEREBROVASCULAR ACCIDENT): ICD-10-CM

## 2021-08-18 DIAGNOSIS — R26.9 ABNORMALITY OF GAIT AND MOBILITY: ICD-10-CM

## 2021-08-18 PROCEDURE — 99213 OFFICE O/P EST LOW 20 MIN: CPT | Mod: S$PBB | Performed by: NURSE PRACTITIONER

## 2021-08-18 PROCEDURE — 99999 PR STA SHADOW: CPT | Mod: PBBFAC,,, | Performed by: NURSE PRACTITIONER

## 2021-08-18 PROCEDURE — 99214 OFFICE O/P EST MOD 30 MIN: CPT | Mod: PBBFAC | Performed by: NURSE PRACTITIONER

## 2021-08-18 PROCEDURE — 99999 PR PBB SHADOW E&M-EST. PATIENT-LVL IV: ICD-10-PCS | Mod: PBBFAC,,, | Performed by: NURSE PRACTITIONER

## 2021-08-18 PROCEDURE — 99999 PR PBB SHADOW E&M-EST. PATIENT-LVL IV: CPT | Mod: PBBFAC,,, | Performed by: NURSE PRACTITIONER

## 2021-09-15 DIAGNOSIS — F32.A DEPRESSION, UNSPECIFIED DEPRESSION TYPE: ICD-10-CM

## 2021-09-16 RX ORDER — CLONAZEPAM 0.5 MG/1
0.25 TABLET ORAL DAILY
Qty: 45 TABLET | Refills: 5 | Status: SHIPPED | OUTPATIENT
Start: 2021-09-16 | End: 2021-12-14

## 2021-09-16 RX ORDER — SIMVASTATIN 40 MG/1
40 TABLET, FILM COATED ORAL NIGHTLY
Qty: 90 TABLET | Refills: 1 | Status: SHIPPED | OUTPATIENT
Start: 2021-09-16 | End: 2022-01-01

## 2021-09-23 NOTE — TELEPHONE ENCOUNTER
----- Message from Lata Marshall sent at 2019  2:59 PM CDT -----  Contact: Self  Alysia Ross  MRN: [id number]  : 3/1/1931  PCP: Carl Aguayo  Home Phone      [phone]  Work Phone      Not on file.  Mobile          Not on file.      MESSAGE:   Patient isn't sure when she needs to be seen again. Please advise.      Phone:  [phone]          
Apt scheduled.  
FAMILY HISTORY:  Father  Still living? Unknown  Diabetes mellitus, Age at diagnosis: Age Unknown  Hypertension, Age at diagnosis: Age Unknown    Mother  Still living? Unknown  Diabetes mellitus, Age at diagnosis: Age Unknown  Family history of CHF (congestive heart failure), Age at diagnosis: Age Unknown  Hypertension, Age at diagnosis: Age Unknown

## 2021-10-18 DIAGNOSIS — I10 ESSENTIAL HYPERTENSION: ICD-10-CM

## 2021-10-18 RX ORDER — METOPROLOL SUCCINATE 25 MG/1
TABLET, EXTENDED RELEASE ORAL
Qty: 90 TABLET | Refills: 1 | Status: SHIPPED | OUTPATIENT
Start: 2021-10-18 | End: 2022-04-07

## 2021-12-13 ENCOUNTER — PATIENT MESSAGE (OUTPATIENT)
Dept: INTERNAL MEDICINE | Facility: CLINIC | Age: 86
End: 2021-12-13
Payer: MEDICARE

## 2021-12-14 ENCOUNTER — TELEPHONE (OUTPATIENT)
Dept: INTERNAL MEDICINE | Facility: CLINIC | Age: 86
End: 2021-12-14
Payer: MEDICARE

## 2021-12-14 RX ORDER — CLONAZEPAM 0.25 MG/1
0.25 TABLET, ORALLY DISINTEGRATING ORAL DAILY
Qty: 60 TABLET | Refills: 2 | Status: ON HOLD | OUTPATIENT
Start: 2021-12-14 | End: 2022-06-02 | Stop reason: HOSPADM

## 2022-01-01 ENCOUNTER — LAB VISIT (OUTPATIENT)
Dept: LAB | Facility: HOSPITAL | Age: 87
End: 2022-01-01
Attending: HOSPITALIST
Payer: MEDICARE

## 2022-01-01 ENCOUNTER — OFFICE VISIT (OUTPATIENT)
Dept: INTERNAL MEDICINE | Facility: CLINIC | Age: 87
End: 2022-01-01
Payer: MEDICARE

## 2022-01-01 VITALS
OXYGEN SATURATION: 97 % | HEIGHT: 62 IN | RESPIRATION RATE: 20 BRPM | HEART RATE: 78 BPM | WEIGHT: 203 LBS | BODY MASS INDEX: 37.36 KG/M2 | DIASTOLIC BLOOD PRESSURE: 82 MMHG | SYSTOLIC BLOOD PRESSURE: 124 MMHG

## 2022-01-01 DIAGNOSIS — I10 ESSENTIAL HYPERTENSION: ICD-10-CM

## 2022-01-01 DIAGNOSIS — H61.21 HEARING LOSS DUE TO CERUMEN IMPACTION, RIGHT: ICD-10-CM

## 2022-01-01 DIAGNOSIS — F32.A DEPRESSION, UNSPECIFIED DEPRESSION TYPE: ICD-10-CM

## 2022-01-01 DIAGNOSIS — R25.1 TREMOR: ICD-10-CM

## 2022-01-01 DIAGNOSIS — M81.0 OSTEOPOROSIS, UNSPECIFIED OSTEOPOROSIS TYPE, UNSPECIFIED PATHOLOGICAL FRACTURE PRESENCE: ICD-10-CM

## 2022-01-01 DIAGNOSIS — D64.9 ANEMIA, UNSPECIFIED: Primary | ICD-10-CM

## 2022-01-01 DIAGNOSIS — H61.21 IMPACTED CERUMEN OF RIGHT EAR: ICD-10-CM

## 2022-01-01 LAB — OB PNL STL: NEGATIVE

## 2022-01-01 PROCEDURE — 99214 OFFICE O/P EST MOD 30 MIN: CPT | Mod: PBBFAC,PN | Performed by: NURSE PRACTITIONER

## 2022-01-01 PROCEDURE — 99999 PR PBB SHADOW E&M-EST. PATIENT-LVL IV: CPT | Mod: PBBFAC,,, | Performed by: NURSE PRACTITIONER

## 2022-01-01 PROCEDURE — 82272 OCCULT BLD FECES 1-3 TESTS: CPT | Performed by: HOSPITALIST

## 2022-01-01 PROCEDURE — 99999 PR PBB SHADOW E&M-EST. PATIENT-LVL IV: ICD-10-PCS | Mod: PBBFAC,,, | Performed by: NURSE PRACTITIONER

## 2022-01-01 PROCEDURE — 99213 PR OFFICE/OUTPT VISIT, EST, LEVL III, 20-29 MIN: ICD-10-PCS | Mod: S$PBB,,, | Performed by: NURSE PRACTITIONER

## 2022-01-01 PROCEDURE — 99213 OFFICE O/P EST LOW 20 MIN: CPT | Mod: S$PBB,,, | Performed by: NURSE PRACTITIONER

## 2022-01-01 RX ORDER — ACETAMINOPHEN 500 MG/1
CAPSULE, LIQUID FILLED ORAL
COMMUNITY

## 2022-01-01 RX ORDER — HYDROCHLOROTHIAZIDE 25 MG/1
25 TABLET ORAL DAILY
COMMUNITY
Start: 2022-01-01

## 2022-01-01 RX ORDER — SPIRONOLACTONE 25 MG/1
25 TABLET ORAL DAILY
COMMUNITY
Start: 2022-01-01

## 2022-01-01 RX ORDER — CITALOPRAM 10 MG/1
TABLET ORAL
Qty: 90 TABLET | Refills: 1 | Status: SHIPPED | OUTPATIENT
Start: 2022-01-01

## 2022-01-01 RX ORDER — AMLODIPINE BESYLATE 2.5 MG/1
TABLET ORAL
Qty: 90 TABLET | Refills: 1 | Status: SHIPPED | OUTPATIENT
Start: 2022-01-01

## 2022-01-01 RX ORDER — SIMVASTATIN 40 MG/1
TABLET, FILM COATED ORAL
Qty: 90 TABLET | Refills: 1 | Status: SHIPPED | OUTPATIENT
Start: 2022-01-01

## 2022-01-01 RX ORDER — PRIMIDONE 50 MG/1
TABLET ORAL
Qty: 270 TABLET | Refills: 1 | Status: SHIPPED | OUTPATIENT
Start: 2022-01-01

## 2022-01-01 RX ORDER — ALENDRONATE SODIUM 70 MG/1
TABLET ORAL
Qty: 12 TABLET | Refills: 1 | Status: SHIPPED | OUTPATIENT
Start: 2022-01-01

## 2022-01-03 DIAGNOSIS — M81.0 OSTEOPOROSIS, UNSPECIFIED OSTEOPOROSIS TYPE, UNSPECIFIED PATHOLOGICAL FRACTURE PRESENCE: ICD-10-CM

## 2022-01-03 RX ORDER — ALENDRONATE SODIUM 70 MG/1
TABLET ORAL
Qty: 12 TABLET | Refills: 1 | Status: SHIPPED | OUTPATIENT
Start: 2022-01-03 | End: 2022-01-01

## 2022-01-09 DIAGNOSIS — F32.A DEPRESSION, UNSPECIFIED DEPRESSION TYPE: ICD-10-CM

## 2022-01-09 DIAGNOSIS — R25.1 TREMOR: ICD-10-CM

## 2022-01-09 DIAGNOSIS — I10 ESSENTIAL HYPERTENSION: ICD-10-CM

## 2022-01-11 RX ORDER — AMLODIPINE BESYLATE 2.5 MG/1
TABLET ORAL
Qty: 90 TABLET | Refills: 1 | Status: ON HOLD | OUTPATIENT
Start: 2022-01-11 | End: 2022-06-02 | Stop reason: SDUPTHER

## 2022-01-11 RX ORDER — CITALOPRAM 10 MG/1
TABLET ORAL
Qty: 90 TABLET | Refills: 1 | Status: SHIPPED | OUTPATIENT
Start: 2022-01-11 | End: 2022-01-01

## 2022-01-11 RX ORDER — PRIMIDONE 50 MG/1
TABLET ORAL
Qty: 270 TABLET | Refills: 1 | Status: SHIPPED | OUTPATIENT
Start: 2022-01-11 | End: 2022-01-01

## 2022-01-29 DIAGNOSIS — I10 ESSENTIAL HYPERTENSION: ICD-10-CM

## 2022-01-29 DIAGNOSIS — R60.9 DEPENDENT EDEMA: ICD-10-CM

## 2022-01-30 RX ORDER — SPIRONOLACTONE AND HYDROCHLOROTHIAZIDE 25; 25 MG/1; MG/1
TABLET ORAL
Qty: 90 TABLET | Refills: 1 | Status: SHIPPED | OUTPATIENT
Start: 2022-01-30 | End: 2022-01-01

## 2022-03-07 ENCOUNTER — OFFICE VISIT (OUTPATIENT)
Dept: INTERNAL MEDICINE | Facility: CLINIC | Age: 87
End: 2022-03-07
Payer: MEDICARE

## 2022-03-07 VITALS
RESPIRATION RATE: 20 BRPM | WEIGHT: 192.44 LBS | OXYGEN SATURATION: 92 % | TEMPERATURE: 98 F | HEART RATE: 70 BPM | HEIGHT: 60 IN | DIASTOLIC BLOOD PRESSURE: 78 MMHG | BODY MASS INDEX: 37.78 KG/M2 | SYSTOLIC BLOOD PRESSURE: 158 MMHG

## 2022-03-07 DIAGNOSIS — B96.89 ACUTE BACTERIAL BRONCHITIS: Primary | ICD-10-CM

## 2022-03-07 DIAGNOSIS — J20.8 ACUTE BACTERIAL BRONCHITIS: Primary | ICD-10-CM

## 2022-03-07 PROCEDURE — 99999 PR PBB SHADOW E&M-EST. PATIENT-LVL IV: CPT | Mod: PBBFAC,,, | Performed by: NURSE PRACTITIONER

## 2022-03-07 PROCEDURE — 99999 PR STA SHADOW: CPT | Mod: PBBFAC,,, | Performed by: NURSE PRACTITIONER

## 2022-03-07 PROCEDURE — 99213 OFFICE O/P EST LOW 20 MIN: CPT | Mod: S$PBB | Performed by: NURSE PRACTITIONER

## 2022-03-07 PROCEDURE — 99214 OFFICE O/P EST MOD 30 MIN: CPT | Mod: PBBFAC | Performed by: NURSE PRACTITIONER

## 2022-03-07 PROCEDURE — 99999 PR PBB SHADOW E&M-EST. PATIENT-LVL IV: ICD-10-PCS | Mod: PBBFAC,,, | Performed by: NURSE PRACTITIONER

## 2022-03-07 RX ORDER — DOXYCYCLINE HYCLATE 100 MG
100 TABLET ORAL EVERY 12 HOURS
Qty: 14 TABLET | Refills: 0 | Status: SHIPPED | OUTPATIENT
Start: 2022-03-07 | End: 2022-03-14

## 2022-03-07 NOTE — PROGRESS NOTES
Subjective:       Patient ID: Alysia Ross is a 89 y.o. female.    Chief Complaint: Bronchitis    Patient is known, to me and presents with   Chief Complaint   Patient presents with    Bronchitis   .  Denies chest pain and shortness of breath.  Patient presents with bronchitis like symptoms. Last time she was in the hospital for bronchitis in July. Doing her breathing tx but she and family wants to make sure she gets on antibx   HPI  Review of Systems   Constitutional: Negative.    HENT: Positive for congestion and postnasal drip.    Eyes: Negative.    Respiratory: Positive for cough.    Cardiovascular: Negative.    Skin: Negative.    Hematological: Negative.        Objective:      Physical Exam  Constitutional:       General: She is not in acute distress.     Appearance: Normal appearance. She is obese. She is not ill-appearing, toxic-appearing or diaphoretic.   HENT:      Head: Normocephalic and atraumatic.      Right Ear: Tympanic membrane normal.      Left Ear: Tympanic membrane normal.      Nose: Nose normal. No congestion.      Mouth/Throat:      Mouth: Mucous membranes are moist.      Pharynx: Oropharynx is clear. No posterior oropharyngeal erythema.   Eyes:      General:         Right eye: No discharge.         Left eye: No discharge.      Conjunctiva/sclera: Conjunctivae normal.   Neck:      Vascular: No carotid bruit.   Cardiovascular:      Rate and Rhythm: Regular rhythm.      Heart sounds: Normal heart sounds. No murmur heard.  Pulmonary:      Effort: Pulmonary effort is normal.      Breath sounds: Rhonchi present. No wheezing.      Comments: Mild rhonchi to posterior fields  Musculoskeletal:      Cervical back: Normal range of motion and neck supple. No rigidity or tenderness.   Lymphadenopathy:      Cervical: No cervical adenopathy.   Skin:     General: Skin is warm and dry.      Capillary Refill: Capillary refill takes less than 2 seconds.      Coloration: Skin is not jaundiced or pale.       "Findings: No bruising, erythema, lesion or rash.   Neurological:      Mental Status: She is alert.         Assessment:       1. Acute bacterial bronchitis        Plan:   Alysia ROBERTS was seen today for bronchitis.    Diagnoses and all orders for this visit:    Acute bacterial bronchitis  -     doxycycline (VIBRA-TABS) 100 MG tablet; Take 1 tablet (100 mg total) by mouth every 12 (twelve) hours. for 7 days      "This note will not be shared with the patient."  Will continue with breathing tx  If any worsening call me  rtc as scheduled  "

## 2022-03-15 ENCOUNTER — PATIENT MESSAGE (OUTPATIENT)
Dept: INTERNAL MEDICINE | Facility: CLINIC | Age: 87
End: 2022-03-15
Payer: MEDICARE

## 2022-03-15 ENCOUNTER — HOSPITAL ENCOUNTER (OUTPATIENT)
Dept: RADIOLOGY | Facility: HOSPITAL | Age: 87
Discharge: HOME | End: 2022-03-15
Attending: NURSE PRACTITIONER
Payer: MEDICARE

## 2022-03-15 DIAGNOSIS — B96.89 ACUTE BACTERIAL BRONCHITIS: Primary | ICD-10-CM

## 2022-03-15 DIAGNOSIS — B96.89 ACUTE BACTERIAL BRONCHITIS: ICD-10-CM

## 2022-03-15 DIAGNOSIS — J20.8 ACUTE BACTERIAL BRONCHITIS: Primary | ICD-10-CM

## 2022-03-15 DIAGNOSIS — J20.8 ACUTE BACTERIAL BRONCHITIS: ICD-10-CM

## 2022-03-15 PROCEDURE — 71046 XR CHEST PA AND LATERAL: ICD-10-PCS | Mod: 26,,, | Performed by: RADIOLOGY

## 2022-03-15 PROCEDURE — 71046 X-RAY EXAM CHEST 2 VIEWS: CPT | Mod: TC

## 2022-03-15 PROCEDURE — 71046 X-RAY EXAM CHEST 2 VIEWS: CPT | Mod: 26,,, | Performed by: RADIOLOGY

## 2022-03-15 RX ORDER — LEVOFLOXACIN 500 MG/1
500 TABLET, FILM COATED ORAL DAILY
Qty: 5 TABLET | Refills: 0 | Status: SHIPPED | OUTPATIENT
Start: 2022-03-15 | End: 2022-03-20

## 2022-03-17 ENCOUNTER — PATIENT MESSAGE (OUTPATIENT)
Dept: INTERNAL MEDICINE | Facility: CLINIC | Age: 87
End: 2022-03-17
Payer: MEDICARE

## 2022-03-17 DIAGNOSIS — J18.9 PNEUMONIA OF LEFT LOWER LOBE DUE TO INFECTIOUS ORGANISM: ICD-10-CM

## 2022-03-17 DIAGNOSIS — R93.89 ABNORMAL CHEST X-RAY: Primary | ICD-10-CM

## 2022-03-18 ENCOUNTER — HOSPITAL ENCOUNTER (OUTPATIENT)
Dept: RADIOLOGY | Facility: HOSPITAL | Age: 87
Discharge: HOME | End: 2022-03-18
Attending: NURSE PRACTITIONER
Payer: MEDICARE

## 2022-03-18 DIAGNOSIS — J18.9 PNEUMONIA OF LEFT LOWER LOBE DUE TO INFECTIOUS ORGANISM: ICD-10-CM

## 2022-03-18 DIAGNOSIS — R93.89 ABNORMAL CHEST X-RAY: ICD-10-CM

## 2022-03-18 PROCEDURE — 71250 CT CHEST WITHOUT CONTRAST: ICD-10-PCS | Mod: 26,,, | Performed by: RADIOLOGY

## 2022-03-18 PROCEDURE — 71250 CT THORAX DX C-: CPT | Mod: 26,,, | Performed by: RADIOLOGY

## 2022-03-18 PROCEDURE — 71250 CT THORAX DX C-: CPT | Mod: TC

## 2022-03-31 DIAGNOSIS — B37.2 CANDIDIASIS, INTERTRIGO: ICD-10-CM

## 2022-04-01 RX ORDER — NYSTATIN 100000 [USP'U]/G
POWDER TOPICAL 2 TIMES DAILY
Qty: 60 G | Refills: 2 | Status: SHIPPED | OUTPATIENT
Start: 2022-04-01 | End: 2022-01-01

## 2022-04-07 DIAGNOSIS — I10 ESSENTIAL HYPERTENSION: ICD-10-CM

## 2022-04-07 RX ORDER — METOPROLOL SUCCINATE 25 MG/1
TABLET, EXTENDED RELEASE ORAL
Qty: 90 TABLET | Refills: 1 | Status: SHIPPED | OUTPATIENT
Start: 2022-04-07

## 2022-04-28 DIAGNOSIS — I10 ESSENTIAL HYPERTENSION: ICD-10-CM

## 2022-04-28 RX ORDER — LISINOPRIL 40 MG/1
TABLET ORAL
Qty: 90 TABLET | Refills: 1 | Status: SHIPPED | OUTPATIENT
Start: 2022-04-28

## 2022-05-27 ENCOUNTER — HOSPITAL ENCOUNTER (EMERGENCY)
Facility: HOSPITAL | Age: 87
Discharge: ANOTHER HOSPITAL | End: 2022-05-28
Attending: STUDENT IN AN ORGANIZED HEALTH CARE EDUCATION/TRAINING PROGRAM
Payer: MEDICARE

## 2022-05-27 ENCOUNTER — PATIENT MESSAGE (OUTPATIENT)
Dept: INTERNAL MEDICINE | Facility: CLINIC | Age: 87
End: 2022-05-27
Payer: MEDICARE

## 2022-05-27 DIAGNOSIS — E87.20 LACTIC ACIDOSIS: ICD-10-CM

## 2022-05-27 DIAGNOSIS — R06.02 SOB (SHORTNESS OF BREATH): Primary | ICD-10-CM

## 2022-05-27 DIAGNOSIS — U07.1 COVID-19: Primary | ICD-10-CM

## 2022-05-27 DIAGNOSIS — U07.1 COVID-19 VIRUS DETECTED: ICD-10-CM

## 2022-05-27 DIAGNOSIS — R05.9 COUGH: ICD-10-CM

## 2022-05-27 DIAGNOSIS — R09.02 HYPOXIA: ICD-10-CM

## 2022-05-27 DIAGNOSIS — R06.02 SHORTNESS OF BREATH: ICD-10-CM

## 2022-05-27 LAB
ALBUMIN SERPL BCP-MCNC: 3.1 G/DL (ref 3.5–5.2)
ALLENS TEST: ABNORMAL
ALP SERPL-CCNC: 80 U/L (ref 55–135)
ALT SERPL W/O P-5'-P-CCNC: 10 U/L (ref 10–44)
ANION GAP SERPL CALC-SCNC: 16 MMOL/L (ref 8–16)
AST SERPL-CCNC: 15 U/L (ref 10–40)
BASOPHILS # BLD AUTO: 0.02 K/UL (ref 0–0.2)
BASOPHILS NFR BLD: 0.3 % (ref 0–1.9)
BILIRUB SERPL-MCNC: 0.2 MG/DL (ref 0.1–1)
BILIRUB UR QL STRIP: NEGATIVE
BNP SERPL-MCNC: 465 PG/ML (ref 0–99)
BUN SERPL-MCNC: 43 MG/DL (ref 10–30)
CALCIUM SERPL-MCNC: 8.8 MG/DL (ref 8.7–10.5)
CHLORIDE SERPL-SCNC: 99 MMOL/L (ref 95–110)
CLARITY UR: CLEAR
CO2 SERPL-SCNC: 26 MMOL/L (ref 23–29)
COLOR UR: YELLOW
CREAT SERPL-MCNC: 1.3 MG/DL (ref 0.5–1.4)
DELSYS: ABNORMAL
DIFFERENTIAL METHOD: ABNORMAL
EOSINOPHIL # BLD AUTO: 0 K/UL (ref 0–0.5)
EOSINOPHIL NFR BLD: 0.1 % (ref 0–8)
ERYTHROCYTE [DISTWIDTH] IN BLOOD BY AUTOMATED COUNT: 16.4 % (ref 11.5–14.5)
EST. GFR  (AFRICAN AMERICAN): 41 ML/MIN/1.73 M^2
EST. GFR  (NON AFRICAN AMERICAN): 36 ML/MIN/1.73 M^2
FIO2: 21 (ref 21–100)
GLUCOSE SERPL-MCNC: 111 MG/DL (ref 70–110)
GLUCOSE UR QL STRIP: NEGATIVE
HCO3 UR-SCNC: 27 MMOL/L
HCT VFR BLD AUTO: 34.4 % (ref 37–48.5)
HGB BLD-MCNC: 11 G/DL (ref 12–16)
HGB UR QL STRIP: NEGATIVE
IMM GRANULOCYTES # BLD AUTO: 0.02 K/UL (ref 0–0.04)
IMM GRANULOCYTES NFR BLD AUTO: 0.3 % (ref 0–0.5)
INFLUENZA A, MOLECULAR: NEGATIVE
INFLUENZA B, MOLECULAR: NEGATIVE
KETONES UR QL STRIP: NEGATIVE
LACTATE SERPL-SCNC: 1.4 MMOL/L (ref 0.5–2.2)
LACTATE SERPL-SCNC: 3.2 MMOL/L (ref 0.5–2.2)
LEUKOCYTE ESTERASE UR QL STRIP: NEGATIVE
LYMPHOCYTES # BLD AUTO: 1.8 K/UL (ref 1–4.8)
LYMPHOCYTES NFR BLD: 25.8 % (ref 18–48)
MCH RBC QN AUTO: 32 PG (ref 27–31)
MCHC RBC AUTO-ENTMCNC: 32 G/DL (ref 32–36)
MCV RBC AUTO: 100 FL (ref 82–98)
MONOCYTES # BLD AUTO: 1.2 K/UL (ref 0.3–1)
MONOCYTES NFR BLD: 17.9 % (ref 4–15)
NEUTROPHILS # BLD AUTO: 3.8 K/UL (ref 1.8–7.7)
NEUTROPHILS NFR BLD: 55.6 % (ref 38–73)
NITRITE UR QL STRIP: NEGATIVE
NRBC BLD-RTO: 0 /100 WBC
PCO2 BLDA: 38 MMHG (ref 35–45)
PH SMN: 7.46 [PH] (ref 7.35–7.45)
PH UR STRIP: 6 [PH] (ref 5–8)
PLATELET # BLD AUTO: 189 K/UL (ref 150–450)
PMV BLD AUTO: 9.3 FL (ref 9.2–12.9)
PO2 BLDA: 49 MMHG (ref 75–100)
POC BE: 3 MMOL/L (ref -2–2)
POC COHB: 2 % (ref 0–3)
POC METHB: 1.2 % (ref 0–1.5)
POC O2HB ARTERIAL: 85.4 % (ref 94–100)
POC SATURATED O2: 88.2 % (ref 90–100)
POC TCO2: 28.2 MMOL/L
POC THB: 10.8 G/DL (ref 12–18)
POTASSIUM SERPL-SCNC: 3.7 MMOL/L (ref 3.5–5.1)
PROCALCITONIN SERPL IA-MCNC: 0.08 NG/ML
PROT SERPL-MCNC: 7.5 G/DL (ref 6–8.4)
PROT UR QL STRIP: NEGATIVE
RBC # BLD AUTO: 3.44 M/UL (ref 4–5.4)
SARS-COV-2 RDRP RESP QL NAA+PROBE: POSITIVE
SITE: ABNORMAL
SODIUM SERPL-SCNC: 141 MMOL/L (ref 136–145)
SP GR UR STRIP: 1.02 (ref 1–1.03)
SPECIMEN SOURCE: NORMAL
TROPONIN I SERPL DL<=0.01 NG/ML-MCNC: 0.05 NG/ML (ref 0–0.03)
TROPONIN I SERPL DL<=0.01 NG/ML-MCNC: 0.06 NG/ML (ref 0–0.03)
URN SPEC COLLECT METH UR: NORMAL
UROBILINOGEN UR STRIP-ACNC: NEGATIVE EU/DL
WBC # BLD AUTO: 6.81 K/UL (ref 3.9–12.7)

## 2022-05-27 PROCEDURE — 93005 ELECTROCARDIOGRAM TRACING: CPT

## 2022-05-27 PROCEDURE — 25000003 PHARM REV CODE 250: Performed by: STUDENT IN AN ORGANIZED HEALTH CARE EDUCATION/TRAINING PROGRAM

## 2022-05-27 PROCEDURE — U0002 COVID-19 LAB TEST NON-CDC: HCPCS | Performed by: STUDENT IN AN ORGANIZED HEALTH CARE EDUCATION/TRAINING PROGRAM

## 2022-05-27 PROCEDURE — 63600175 PHARM REV CODE 636 W HCPCS: Performed by: STUDENT IN AN ORGANIZED HEALTH CARE EDUCATION/TRAINING PROGRAM

## 2022-05-27 PROCEDURE — 82803 BLOOD GASES ANY COMBINATION: CPT | Performed by: STUDENT IN AN ORGANIZED HEALTH CARE EDUCATION/TRAINING PROGRAM

## 2022-05-27 PROCEDURE — 93010 EKG 12-LEAD: ICD-10-PCS | Mod: ,,, | Performed by: INTERNAL MEDICINE

## 2022-05-27 PROCEDURE — 27000221 HC OXYGEN, UP TO 24 HOURS

## 2022-05-27 PROCEDURE — 99291 CRITICAL CARE FIRST HOUR: CPT | Mod: 25

## 2022-05-27 PROCEDURE — 83880 ASSAY OF NATRIURETIC PEPTIDE: CPT | Performed by: STUDENT IN AN ORGANIZED HEALTH CARE EDUCATION/TRAINING PROGRAM

## 2022-05-27 PROCEDURE — 36600 WITHDRAWAL OF ARTERIAL BLOOD: CPT | Mod: 59

## 2022-05-27 PROCEDURE — 93010 ELECTROCARDIOGRAM REPORT: CPT | Mod: ,,, | Performed by: INTERNAL MEDICINE

## 2022-05-27 PROCEDURE — 80053 COMPREHEN METABOLIC PANEL: CPT | Performed by: STUDENT IN AN ORGANIZED HEALTH CARE EDUCATION/TRAINING PROGRAM

## 2022-05-27 PROCEDURE — 83605 ASSAY OF LACTIC ACID: CPT | Performed by: STUDENT IN AN ORGANIZED HEALTH CARE EDUCATION/TRAINING PROGRAM

## 2022-05-27 PROCEDURE — 87040 BLOOD CULTURE FOR BACTERIA: CPT | Mod: 59 | Performed by: STUDENT IN AN ORGANIZED HEALTH CARE EDUCATION/TRAINING PROGRAM

## 2022-05-27 PROCEDURE — 25000003 PHARM REV CODE 250: Performed by: SURGERY

## 2022-05-27 PROCEDURE — 99900035 HC TECH TIME PER 15 MIN (STAT)

## 2022-05-27 PROCEDURE — 85025 COMPLETE CBC W/AUTO DIFF WBC: CPT | Performed by: STUDENT IN AN ORGANIZED HEALTH CARE EDUCATION/TRAINING PROGRAM

## 2022-05-27 PROCEDURE — 81003 URINALYSIS AUTO W/O SCOPE: CPT | Performed by: STUDENT IN AN ORGANIZED HEALTH CARE EDUCATION/TRAINING PROGRAM

## 2022-05-27 PROCEDURE — 96365 THER/PROPH/DIAG IV INF INIT: CPT

## 2022-05-27 PROCEDURE — 84484 ASSAY OF TROPONIN QUANT: CPT | Mod: 91 | Performed by: STUDENT IN AN ORGANIZED HEALTH CARE EDUCATION/TRAINING PROGRAM

## 2022-05-27 PROCEDURE — 96375 TX/PRO/DX INJ NEW DRUG ADDON: CPT

## 2022-05-27 PROCEDURE — 87502 INFLUENZA DNA AMP PROBE: CPT | Performed by: STUDENT IN AN ORGANIZED HEALTH CARE EDUCATION/TRAINING PROGRAM

## 2022-05-27 PROCEDURE — 84145 PROCALCITONIN (PCT): CPT | Performed by: STUDENT IN AN ORGANIZED HEALTH CARE EDUCATION/TRAINING PROGRAM

## 2022-05-27 PROCEDURE — 96361 HYDRATE IV INFUSION ADD-ON: CPT

## 2022-05-27 RX ORDER — HYDROCODONE BITARTRATE AND ACETAMINOPHEN 5; 325 MG/1; MG/1
1 TABLET ORAL
Status: COMPLETED | OUTPATIENT
Start: 2022-05-27 | End: 2022-05-27

## 2022-05-27 RX ORDER — DEXAMETHASONE SODIUM PHOSPHATE 4 MG/ML
6 INJECTION, SOLUTION INTRA-ARTICULAR; INTRALESIONAL; INTRAMUSCULAR; INTRAVENOUS; SOFT TISSUE EVERY 24 HOURS
Status: DISCONTINUED | OUTPATIENT
Start: 2022-05-28 | End: 2022-05-28 | Stop reason: HOSPADM

## 2022-05-27 RX ORDER — IBUPROFEN 800 MG/1
800 TABLET ORAL
Status: COMPLETED | OUTPATIENT
Start: 2022-05-27 | End: 2022-05-27

## 2022-05-27 RX ORDER — ACETAMINOPHEN 500 MG
1000 TABLET ORAL
Status: COMPLETED | OUTPATIENT
Start: 2022-05-27 | End: 2022-05-27

## 2022-05-27 RX ADMIN — IBUPROFEN 800 MG: 800 TABLET, FILM COATED ORAL at 02:05

## 2022-05-27 RX ADMIN — ACETAMINOPHEN 1000 MG: 500 TABLET ORAL at 02:05

## 2022-05-27 RX ADMIN — HYDROCODONE BITARTRATE AND ACETAMINOPHEN 1 TABLET: 5; 325 TABLET ORAL at 08:05

## 2022-05-27 RX ADMIN — REMDESIVIR 200 MG: 100 INJECTION, POWDER, LYOPHILIZED, FOR SOLUTION INTRAVENOUS at 04:05

## 2022-05-27 RX ADMIN — SODIUM CHLORIDE 1365 ML: 0.9 INJECTION, SOLUTION INTRAVENOUS at 03:05

## 2022-05-27 NOTE — ED TRIAGE NOTES
89 y.o. female presents to ER Room/bed info not found   Chief Complaint   Patient presents with    Cough   .   Here per [facility] with c/o cough and fever, upon EMS arrival pt's O2 sat in 80s and hypotensive

## 2022-05-27 NOTE — PROVIDER TRANSFER
Outside Transfer Acceptance Note / Regional Referral Center    Referring facility: [facility]  Referring provider: AUSTYN HARDEN  Accepting facility: hospitals  Accepting provider: MATTHEW HEWITT  Admitting provider: ELEANOR SHOEMAKER  Reason for transfer:  Higher level of care  Transfer diagnosis: COVID pneumonia  Transfer specialty requested: Hospital Medicine  Transfer specialty notified: yes  Transfer level: NUMBER 1-5: 2  Bed type requested: telemetry  Isolation status: Airborne and Contact and Droplet   Admission class or status: IP- Inpatient    Narrative     89-year-old female with a history of hypertension and hyperlipidemia presented to the [facility] with dyspnea.  She has a nonproductive cough that has worsened for several days.  She also noted fatigue and lethargy. No chest pain noted. Initial room air O2 sats with EMS were in the high 80s% range.  EMS also noted decreased blood pressure.  She received IV fluids, Solu-Medrol, and albuterol treatments during transport.  In the emergency department she was found to be COVID positive. She received Tylenol, ibuprofen, dexamethasone, remdesivir, and normal saline.  Requesting transfer for higher level of care.  Case discussed with Hospital Medicine at [facility].  Will plan transfer there in COVID isolation for further treatment.    COVID positive  White blood cells 6.81, hemoglobin 11, hematocrit 34.4, platelets 189, lactic acid 3.2, urinalysis negative, sodium 141, potassium 3.7, chloride 99, CO2 26, BUN 43, creatinine 1.3, glucose 111, AST 15, ALT 10, influenza negative, , troponin 0.054, procalcitonin 0.08  Room air ABG: pH 7.460/pCO2 38/pO2 49   Repeat lactic acid 1.4  Repeat troponin pending    Chest x-ray showed under expanded lungs with crowding of the pulmonary vascularity.  Bibasilar patchy opacities which could reflect atelectasis, aspiration, or pneumonia.  Nodular opacity in the left hilar  region not as clearly seen on today's exam.  Heart is enlarged.  Calcified atheromatous disease affects the tortuous aorta.  Skeletal structures are intact.    EKG showed sinus rhythm with PVCs, left axis deviation, nonspecific T-wave abnormality    Objective     Vitals: Temp: 100 °F (37.8 °C) (05/27/22 1421)  Pulse: 85 (05/27/22 1502)  Resp: 18 (05/27/22 1421)  BP: (!) 118/57 (05/27/22 1502)  SpO2: (!) 93 % (05/27/22 1514)  Recent Labs:   ABGs:   Recent Labs   Lab 05/27/22  1455   PH 7.460*   PCO2 38   HCO3 27.00   POCSATURATED 88.2*   BE 3.00*   TOTALHB 10.8*   COHB 2.0   METHB 1.2   PO2 49*     CBC:   Recent Labs   Lab 05/27/22  1438   WBC 6.81   HGB 11.0*   HCT 34.4*        CMP:   Recent Labs   Lab 05/27/22  1438      K 3.7   CL 99   CO2 26   *   BUN 43*   CREATININE 1.3   CALCIUM 8.8   PROT 7.5   ALBUMIN 3.1*   BILITOT 0.2   ALKPHOS 80   AST 15   ALT 10   ANIONGAP 16   EGFRNONAA 36*     Lactic Acid:   Recent Labs   Lab 05/27/22  1438 05/27/22  1640   LACTATE 3.2* 1.4     Troponin:   Recent Labs   Lab 05/27/22  1438   TROPONINI 0.054*     Recent imaging: see above   IV access:        Peripheral IV - Single Lumen 05/27/22 1415 18 G Right Hand (Active)   Site Assessment Clean;Dry;Intact;No redness;No swelling 05/27/22 1414   Line Status Blood return noted;Flushed 05/27/22 1414     Allergies:   Review of patient's allergies indicates:   Allergen Reactions    Sulfa (sulfonamide antibiotics) Hives    Penicillins Rash      NPO: No      Anticoagulation:   Anticoagulants     None           Instructions    1. Admit to Hospital Medicine, COVID isolation  2. COVID protocols    Upon patient arrival to floor, please contact Hospital Medicine on call.     GILBERTO Parmar MD  Hospital Medicine Staff  Cell: [phone]

## 2022-05-27 NOTE — ED PROVIDER NOTES
Encounter Date: 5/27/2022    This document was partially completed using speech recognition software and may contain misspellings, grammatical errors, and/or unexpected word substitutions.       History     Chief Complaint   Patient presents with    Cough     89 year old female with a PMHx of HTN, HLD presents to the ED via EMS with her son for shortness of breath. Patient lives with her son. Son reports he and other family members have been dealing with a head cold. Patient reports she started having a nonproductive cough since early this week. It has worsened - still nonproductive cough. Reports fatigue/no energy. EMS reports she was lethargic as well. Patient satting high 80s when EMS arrived, normally not on O2. Patient BP also 80s systolic, given 500 cc of IV fluids. Also gave 125mg of solumedrol, 2 albuterol treatments. Patient started coming around, became more awake. COVID19 vaccinated.        Review of patient's allergies indicates:   Allergen Reactions    Sulfa (sulfonamide antibiotics) Hives    Penicillins Rash     Past Medical History:   Diagnosis Date    Anxiety     Depression     Hyperlipidemia     Hypertension     Stroke      Past Surgical History:   Procedure Laterality Date    APPENDECTOMY  1960    HYSTERECTOMY  1960s    KIDNEY STONE SURGERY  1950s     Family History   Problem Relation Age of Onset    No Known Problems Mother     Heart disease Father     Heart attack Father     Cancer Brother         lung cancer    Thyroid disease Daughter     Hypertension Daughter     Kidney disease Daughter         kidney stones    Parkinsonism Brother     Dementia Brother     Hypertension Brother     Cancer Son     Hypertension Son     Restless legs syndrome Daughter     Arthritis Daughter         knees    Hypertension Daughter     Parkinsonism Son      Social History     Tobacco Use    Smoking status: Never Smoker    Smokeless tobacco: Never Used   Substance Use Topics    Alcohol  use: No    Drug use: No     Review of Systems   Constitutional: Positive for fatigue and fever. Negative for chills.   HENT: Negative for congestion, rhinorrhea and sneezing.    Eyes: Negative for discharge and redness.   Respiratory: Positive for cough and shortness of breath.    Cardiovascular: Negative for chest pain and palpitations.   Gastrointestinal: Negative for abdominal pain, diarrhea, nausea and vomiting.   Genitourinary: Negative for dysuria, frequency, vaginal bleeding and vaginal discharge.   Musculoskeletal: Negative for back pain and neck pain.   Skin: Negative for rash and wound.   Neurological: Negative for weakness, numbness and headaches.       Physical Exam     Initial Vitals [05/27/22 1421]   BP Pulse Resp Temp SpO2   (!) 121/58 90 18 100 °F (37.8 °C) (!) 92 %      MAP       --         Physical Exam    Nursing note and vitals reviewed.  Constitutional: She appears well-developed. She is not diaphoretic. No distress.   HENT:   Head: Normocephalic and atraumatic.   Right Ear: External ear normal.   Left Ear: External ear normal.   Eyes: Right eye exhibits no discharge. Left eye exhibits no discharge. No scleral icterus.   Neck: Neck supple.   Cardiovascular: Normal rate and regular rhythm.   Pulmonary/Chest: No stridor. No respiratory distress. She has decreased breath sounds. She has no wheezes. She has no rhonchi. She has rales.   Abdominal: Abdomen is soft. There is no abdominal tenderness. There is no guarding.   Musculoskeletal:         General: No edema.      Cervical back: Neck supple.     Neurological: She is alert and oriented to person, place, and time.   Skin: Skin is warm and dry. Capillary refill takes less than 2 seconds.   Psychiatric: She has a normal mood and affect.         ED Course   Critical Care    Date/Time: 5/27/2022 4:18 PM  Performed by: Morgan Crouch DO  Authorized by: Morgan Crouch DO   Direct patient critical care time: 22 minutes  Additional history critical care  time: 4 minutes  Ordering / reviewing critical care time: 3 minutes  Documentation critical care time: 3 minutes  Consulting other physicians critical care time: 3 minutes  Consult with family critical care time: 2 minutes  Total critical care time (exclusive of procedural time) : 37 minutes  Critical care time was exclusive of separately billable procedures and treating other patients and teaching time.  Critical care was necessary to treat or prevent imminent or life-threatening deterioration of the following conditions: sepsis and respiratory failure.  Critical care was time spent personally by me on the following activities: re-evaluation of patient's condition, ordering and review of radiographic studies, ordering and performing treatments and interventions, examination of patient, discussions with consultants, pulse oximetry, review of old charts, evaluation of patient's response to treatment, obtaining history from patient or surrogate and ordering and review of laboratory studies.        Labs Reviewed   CBC W/ AUTO DIFFERENTIAL - Abnormal; Notable for the following components:       Result Value    RBC 3.44 (*)     Hemoglobin 11.0 (*)     Hematocrit 34.4 (*)      (*)     MCH 32.0 (*)     RDW 16.4 (*)     Mono # 1.2 (*)     Mono % 17.9 (*)     All other components within normal limits   COMPREHENSIVE METABOLIC PANEL - Abnormal; Notable for the following components:    Glucose 111 (*)     BUN 43 (*)     Albumin 3.1 (*)     eGFR if  41 (*)     eGFR if non  36 (*)     All other components within normal limits   SARS-COV-2 RNA AMPLIFICATION, QUAL - Abnormal; Notable for the following components:    SARS-CoV-2 RNA, Amplification, Qual Positive (*)     All other components within normal limits   LACTIC ACID, PLASMA - Abnormal; Notable for the following components:    Lactate (Lactic Acid) 3.2 (*)     All other components within normal limits   TROPONIN I - Abnormal; Notable  for the following components:    Troponin I 0.054 (*)     All other components within normal limits   B-TYPE NATRIURETIC PEPTIDE - Abnormal; Notable for the following components:     (*)     All other components within normal limits   INFLUENZA A & B BY MOLECULAR   CULTURE, BLOOD   CULTURE, BLOOD   PROCALCITONIN   URINALYSIS, REFLEX TO URINE CULTURE    Narrative:     Specimen Source->Urine   LACTIC ACID, PLASMA   TROPONIN I     EKG Readings: (Independently Interpreted)   Previous EKG: Compared with most recent EKG Previous EKG Date: 7/18/2021.   Sinus rhythm at 85 bpm. Left axis. Normal intervals. No STEMI.     ECG Results          EKG 12-lead (Final result)  Result time 05/27/22 15:12:16    Final result by Interface, Lab In [facility] (05/27/22 15:12:16)                 Narrative:    Test Reason : R06.02    Vent. Rate : 085 BPM     Atrial Rate : 085 BPM     P-R Int : 144 ms          QRS Dur : 070 ms      QT Int : 370 ms       P-R-T Axes : 053 -60 081 degrees     QTc Int : 440 ms    Sinus rhythm with occasional Premature ventricular complexes  Left axis deviation  Nonspecific T wave abnormality  Abnormal ECG  When compared with ECG of 18-JUL-2021 09:45,  Premature ventricular complexes are now Present  Questionable change in initial forces of Anterior-lateral leads  Non-specific change in ST segment in Inferior leads  Nonspecific T wave abnormality now evident in Anterior-lateral leads  Confirmed by Familia WHITTEN, Neville SMITH ([id number]) on 5/27/2022 3:12:11 PM    Referred By: [id number]   SELF           Confirmed By:Neville Barbosa MD                            Imaging Results          X-Ray Chest 1 View (Final result)  Result time 05/27/22 14:56:37    Final result by Inna Patterson MD (05/27/22 14:56:37)                 Impression:      As above.      Electronically signed by: Inna Patterson MD  Date:    05/27/2022  Time:    14:56             Narrative:    EXAMINATION:  XR CHEST 1 VIEW    CLINICAL HISTORY:  shortness of  breath;    TECHNIQUE:  Single frontal view of the chest was performed.    COMPARISON:  03/15/2022    FINDINGS:  The lungs are under expanded with crowding of the pulmonary vascularity.  There is bibasilar patchy opacities which could reflect atelectasis, aspiration or pneumonia.  Nodular opacity in the left hilar region not as clearly seen on today's study.  The heart is enlarged.  Calcified atheromatous disease affects the tortuous aorta.  Skeletal structures are intact.                                 Medications   dexamethasone injection 6 mg (has no administration in time range)   remdesivir 200 mg in sodium chloride 0.9% 250 mL infusion (has no administration in time range)     Followed by   remdesivir 100 mg in sodium chloride 0.9% 250 mL infusion (has no administration in time range)   acetaminophen tablet 1,000 mg (1,000 mg Oral Given 5/27/22 1448)   ibuprofen tablet 800 mg (800 mg Oral Given 5/27/22 1448)   sodium chloride 0.9% bolus 1,365 mL (1,365 mLs Intravenous New Bag 5/27/22 1515)     Medical Decision Making:   Differential Diagnosis:   DDx: PNA, flu, COVID19, CHF, ACS, PTX  ED Management:  Based on the patient's evaluation - patient needs admission for COVID19. Hypoxic on ABG (7.46 / 38 pCO2 / 49 pO2) at room air. Currently on 2L NC. Lactic elevated - doesn't appear fluid overloaded so given IV fluids. Elevated trop but no chest pain so doubt ACS.     Given dexamethasone and remdesevir. Spoke with [facility] admitting team - Dr. Robles, who is concerned with no ICU beds and patient at risk for worsening, recommends transfer. Updated patient and son who are in agreement with the plan.                      Clinical Impression:   Final diagnoses:  [R06.02] Shortness of breath  [U07.1] COVID-19 (Primary)  [E87.2] Lactic acidosis  [R09.02] Hypoxia  [R05.9] Cough          ED Disposition Condition    Transfer to Another Facility [facility]              Morgan CrouchDO  05/27/22 2214

## 2022-05-27 NOTE — TELEPHONE ENCOUNTER
Medication called in during your absence.     LOV 3/7/2022    Requested Prescriptions     Pending Prescriptions Disp Refills    albuterol-ipratropium (DUO-NEB) 2.5 mg-0.5 mg/3 mL nebulizer solution 1 each 0     Sig: Take 3 mLs by nebulization every 6 (six) hours as needed for Wheezing. Rescue

## 2022-05-28 ENCOUNTER — HOSPITAL ENCOUNTER (INPATIENT)
Facility: HOSPITAL | Age: 87
LOS: 5 days | Discharge: NURSING, REHAB OR LONG-TERM CARE | DRG: 177 | End: 2022-06-02
Attending: FAMILY MEDICINE | Admitting: FAMILY MEDICINE
Payer: MEDICARE

## 2022-05-28 VITALS
DIASTOLIC BLOOD PRESSURE: 78 MMHG | OXYGEN SATURATION: 96 % | WEIGHT: 189 LBS | SYSTOLIC BLOOD PRESSURE: 135 MMHG | RESPIRATION RATE: 20 BRPM | TEMPERATURE: 99 F | HEART RATE: 63 BPM | BODY MASS INDEX: 36.91 KG/M2

## 2022-05-28 DIAGNOSIS — I21.4 NSTEMI (NON-ST ELEVATED MYOCARDIAL INFARCTION): ICD-10-CM

## 2022-05-28 DIAGNOSIS — J12.82 PNEUMONIA DUE TO COVID-19 VIRUS: ICD-10-CM

## 2022-05-28 DIAGNOSIS — U07.1 COVID: ICD-10-CM

## 2022-05-28 DIAGNOSIS — R09.02 HYPOXIA: Primary | ICD-10-CM

## 2022-05-28 DIAGNOSIS — I10 ESSENTIAL HYPERTENSION: ICD-10-CM

## 2022-05-28 DIAGNOSIS — U07.1 PNEUMONIA DUE TO COVID-19 VIRUS: ICD-10-CM

## 2022-05-28 DIAGNOSIS — R06.02 SOB (SHORTNESS OF BREATH): ICD-10-CM

## 2022-05-28 PROBLEM — N17.9 AKI (ACUTE KIDNEY INJURY): Status: ACTIVE | Noted: 2022-05-28

## 2022-05-28 LAB
APTT BLDCRRT: 29.1 SEC (ref 21–32)
CRP SERPL-MCNC: 52.4 MG/L (ref 0–8.2)
ERYTHROCYTE [SEDIMENTATION RATE] IN BLOOD BY WESTERGREN METHOD: 84 MM/HR (ref 0–20)
INR PPP: 1.1 (ref 0.8–1.2)
LACTATE SERPL-SCNC: 1.1 MMOL/L (ref 0.5–2.2)
PROTHROMBIN TIME: 11.6 SEC (ref 9–12.5)
TROPONIN I SERPL DL<=0.01 NG/ML-MCNC: 0.07 NG/ML (ref 0–0.03)
TROPONIN I SERPL DL<=0.01 NG/ML-MCNC: 0.07 NG/ML (ref 0–0.03)

## 2022-05-28 PROCEDURE — 94640 AIRWAY INHALATION TREATMENT: CPT

## 2022-05-28 PROCEDURE — 27000221 HC OXYGEN, UP TO 24 HOURS

## 2022-05-28 PROCEDURE — 27000207 HC ISOLATION

## 2022-05-28 PROCEDURE — 94761 N-INVAS EAR/PLS OXIMETRY MLT: CPT

## 2022-05-28 PROCEDURE — 85730 THROMBOPLASTIN TIME PARTIAL: CPT | Performed by: STUDENT IN AN ORGANIZED HEALTH CARE EDUCATION/TRAINING PROGRAM

## 2022-05-28 PROCEDURE — 85652 RBC SED RATE AUTOMATED: CPT | Performed by: STUDENT IN AN ORGANIZED HEALTH CARE EDUCATION/TRAINING PROGRAM

## 2022-05-28 PROCEDURE — 63600175 PHARM REV CODE 636 W HCPCS: Performed by: STUDENT IN AN ORGANIZED HEALTH CARE EDUCATION/TRAINING PROGRAM

## 2022-05-28 PROCEDURE — 93010 ELECTROCARDIOGRAM REPORT: CPT | Mod: CR,,, | Performed by: INTERNAL MEDICINE

## 2022-05-28 PROCEDURE — 36415 COLL VENOUS BLD VENIPUNCTURE: CPT | Performed by: STUDENT IN AN ORGANIZED HEALTH CARE EDUCATION/TRAINING PROGRAM

## 2022-05-28 PROCEDURE — 25000242 PHARM REV CODE 250 ALT 637 W/ HCPCS: Performed by: STUDENT IN AN ORGANIZED HEALTH CARE EDUCATION/TRAINING PROGRAM

## 2022-05-28 PROCEDURE — 99900035 HC TECH TIME PER 15 MIN (STAT)

## 2022-05-28 PROCEDURE — 86140 C-REACTIVE PROTEIN: CPT | Performed by: STUDENT IN AN ORGANIZED HEALTH CARE EDUCATION/TRAINING PROGRAM

## 2022-05-28 PROCEDURE — 11000001 HC ACUTE MED/SURG PRIVATE ROOM

## 2022-05-28 PROCEDURE — 27100098 HC SPACER

## 2022-05-28 PROCEDURE — 85610 PROTHROMBIN TIME: CPT | Performed by: STUDENT IN AN ORGANIZED HEALTH CARE EDUCATION/TRAINING PROGRAM

## 2022-05-28 PROCEDURE — 25000003 PHARM REV CODE 250: Performed by: STUDENT IN AN ORGANIZED HEALTH CARE EDUCATION/TRAINING PROGRAM

## 2022-05-28 PROCEDURE — 83605 ASSAY OF LACTIC ACID: CPT | Performed by: STUDENT IN AN ORGANIZED HEALTH CARE EDUCATION/TRAINING PROGRAM

## 2022-05-28 PROCEDURE — 84484 ASSAY OF TROPONIN QUANT: CPT | Performed by: STUDENT IN AN ORGANIZED HEALTH CARE EDUCATION/TRAINING PROGRAM

## 2022-05-28 PROCEDURE — 63600175 PHARM REV CODE 636 W HCPCS: Mod: TB | Performed by: STUDENT IN AN ORGANIZED HEALTH CARE EDUCATION/TRAINING PROGRAM

## 2022-05-28 PROCEDURE — 93005 ELECTROCARDIOGRAM TRACING: CPT

## 2022-05-28 PROCEDURE — 63600175 PHARM REV CODE 636 W HCPCS: Performed by: FAMILY MEDICINE

## 2022-05-28 PROCEDURE — 93010 EKG 12-LEAD: ICD-10-PCS | Mod: 76,,, | Performed by: INTERNAL MEDICINE

## 2022-05-28 RX ORDER — ASPIRIN 81 MG/1
81 TABLET ORAL ONCE
Status: COMPLETED | OUTPATIENT
Start: 2022-05-28 | End: 2022-05-28

## 2022-05-28 RX ORDER — GLUCAGON 1 MG
1 KIT INJECTION
Status: DISCONTINUED | OUTPATIENT
Start: 2022-05-28 | End: 2022-06-03 | Stop reason: HOSPADM

## 2022-05-28 RX ORDER — ONDANSETRON 8 MG/1
8 TABLET, ORALLY DISINTEGRATING ORAL EVERY 8 HOURS PRN
Status: DISCONTINUED | OUTPATIENT
Start: 2022-05-28 | End: 2022-06-03 | Stop reason: HOSPADM

## 2022-05-28 RX ORDER — IBUPROFEN 200 MG
16 TABLET ORAL
Status: DISCONTINUED | OUTPATIENT
Start: 2022-05-28 | End: 2022-06-03 | Stop reason: HOSPADM

## 2022-05-28 RX ORDER — PRIMIDONE 50 MG/1
50 TABLET ORAL 3 TIMES DAILY
Status: DISCONTINUED | OUTPATIENT
Start: 2022-05-28 | End: 2022-06-03 | Stop reason: HOSPADM

## 2022-05-28 RX ORDER — SODIUM CHLORIDE 0.9 % (FLUSH) 0.9 %
10 SYRINGE (ML) INJECTION
Status: DISCONTINUED | OUTPATIENT
Start: 2022-05-28 | End: 2022-06-03 | Stop reason: HOSPADM

## 2022-05-28 RX ORDER — IBUPROFEN 200 MG
24 TABLET ORAL
Status: DISCONTINUED | OUTPATIENT
Start: 2022-05-28 | End: 2022-06-03 | Stop reason: HOSPADM

## 2022-05-28 RX ORDER — ACETAMINOPHEN 325 MG/1
650 TABLET ORAL EVERY 8 HOURS PRN
Status: DISCONTINUED | OUTPATIENT
Start: 2022-05-28 | End: 2022-06-03 | Stop reason: HOSPADM

## 2022-05-28 RX ORDER — TALC
6 POWDER (GRAM) TOPICAL NIGHTLY PRN
Status: DISCONTINUED | OUTPATIENT
Start: 2022-05-28 | End: 2022-06-03 | Stop reason: HOSPADM

## 2022-05-28 RX ORDER — CITALOPRAM 10 MG/1
10 TABLET ORAL DAILY
Status: DISCONTINUED | OUTPATIENT
Start: 2022-05-28 | End: 2022-06-03 | Stop reason: HOSPADM

## 2022-05-28 RX ORDER — ALBUTEROL SULFATE 90 UG/1
2 AEROSOL, METERED RESPIRATORY (INHALATION) EVERY 6 HOURS
Status: DISCONTINUED | OUTPATIENT
Start: 2022-05-28 | End: 2022-06-03 | Stop reason: HOSPADM

## 2022-05-28 RX ORDER — ENOXAPARIN SODIUM 100 MG/ML
1 INJECTION SUBCUTANEOUS 2 TIMES DAILY
Status: DISCONTINUED | OUTPATIENT
Start: 2022-05-28 | End: 2022-05-28

## 2022-05-28 RX ORDER — ENOXAPARIN SODIUM 100 MG/ML
1 INJECTION SUBCUTANEOUS
Status: DISCONTINUED | OUTPATIENT
Start: 2022-05-28 | End: 2022-06-03 | Stop reason: HOSPADM

## 2022-05-28 RX ORDER — AMOXICILLIN 250 MG
1 CAPSULE ORAL 2 TIMES DAILY PRN
Status: DISCONTINUED | OUTPATIENT
Start: 2022-05-28 | End: 2022-06-03 | Stop reason: HOSPADM

## 2022-05-28 RX ORDER — ASCORBIC ACID 500 MG
500 TABLET ORAL 2 TIMES DAILY
Status: DISCONTINUED | OUTPATIENT
Start: 2022-05-28 | End: 2022-06-03 | Stop reason: HOSPADM

## 2022-05-28 RX ORDER — AMLODIPINE BESYLATE 2.5 MG/1
2.5 TABLET ORAL DAILY
Status: DISCONTINUED | OUTPATIENT
Start: 2022-05-28 | End: 2022-05-30

## 2022-05-28 RX ORDER — GUAIFENESIN 600 MG/1
600 TABLET, EXTENDED RELEASE ORAL 2 TIMES DAILY
Status: DISCONTINUED | OUTPATIENT
Start: 2022-05-28 | End: 2022-05-29

## 2022-05-28 RX ORDER — ATORVASTATIN CALCIUM 20 MG/1
20 TABLET, FILM COATED ORAL DAILY
Refills: 1 | Status: DISCONTINUED | OUTPATIENT
Start: 2022-05-28 | End: 2022-06-03 | Stop reason: HOSPADM

## 2022-05-28 RX ORDER — METOPROLOL SUCCINATE 25 MG/1
25 TABLET, EXTENDED RELEASE ORAL DAILY
Status: DISCONTINUED | OUTPATIENT
Start: 2022-05-28 | End: 2022-06-03 | Stop reason: HOSPADM

## 2022-05-28 RX ADMIN — CITALOPRAM HYDROBROMIDE 10 MG: 10 TABLET ORAL at 02:05

## 2022-05-28 RX ADMIN — GUAIFENESIN 600 MG: 600 TABLET, EXTENDED RELEASE ORAL at 10:05

## 2022-05-28 RX ADMIN — REMDESIVIR 200 MG: 100 INJECTION, POWDER, LYOPHILIZED, FOR SOLUTION INTRAVENOUS at 02:05

## 2022-05-28 RX ADMIN — AMLODIPINE BESYLATE 2.5 MG: 2.5 TABLET ORAL at 02:05

## 2022-05-28 RX ADMIN — METOPROLOL SUCCINATE 25 MG: 25 TABLET, EXTENDED RELEASE ORAL at 02:05

## 2022-05-28 RX ADMIN — PRIMIDONE 50 MG: 50 TABLET ORAL at 10:05

## 2022-05-28 RX ADMIN — DEXAMETHASONE SODIUM PHOSPHATE 6 MG: 4 INJECTION, SOLUTION INTRA-ARTICULAR; INTRALESIONAL; INTRAMUSCULAR; INTRAVENOUS; SOFT TISSUE at 08:05

## 2022-05-28 RX ADMIN — OXYCODONE HYDROCHLORIDE AND ACETAMINOPHEN 500 MG: 500 TABLET ORAL at 10:05

## 2022-05-28 RX ADMIN — ASPIRIN 81 MG: 81 TABLET, COATED ORAL at 02:05

## 2022-05-28 RX ADMIN — PRIMIDONE 50 MG: 50 TABLET ORAL at 02:05

## 2022-05-28 RX ADMIN — ATORVASTATIN CALCIUM 20 MG: 20 TABLET, FILM COATED ORAL at 02:05

## 2022-05-28 RX ADMIN — ENOXAPARIN SODIUM 80 MG: 100 INJECTION SUBCUTANEOUS at 02:05

## 2022-05-28 RX ADMIN — ALBUTEROL SULFATE 2 PUFF: 90 AEROSOL, METERED RESPIRATORY (INHALATION) at 07:05

## 2022-05-28 RX ADMIN — THERA TABS 1 TABLET: TAB at 02:05

## 2022-05-28 RX ADMIN — OXYCODONE HYDROCHLORIDE AND ACETAMINOPHEN 500 MG: 500 TABLET ORAL at 02:05

## 2022-05-28 RX ADMIN — ALBUTEROL SULFATE 2 PUFF: 90 AEROSOL, METERED RESPIRATORY (INHALATION) at 12:05

## 2022-05-28 NOTE — ASSESSMENT & PLAN NOTE
Pt w/ a new worsening nonproductive cough for 1 wk PTA w/o relief from breathing tx at home  Pt denies any other sx's including SoB  Rapid COVID from ED positive  LA initially elevated but down trended w/ fluids  Procal wnl  ESR elevated to 84 as well as CRP to 52.4  CXR in ED with under expanded lungs and bibasilar patchy opacities  Pt requiring supplemental O2 via NC at admission and wears none at home  Given first doses of remdesivir and dexamethasone while still in ED    Plan:  Monitor O2 sat, maintain >90%, and adjust supplementation as necessary  Airborne, droplet, and contact precautions in place  Cnt remdesivir 100mg for 4 days ending 6/2 and Dexamethasone 6mg for 10 days ending 6/7

## 2022-05-28 NOTE — H&P
[facility]  History & Physical    Patient Name: Alysia Ross  MRN: [id number]  Patient Class: IP- Inpatient  Admission Date: 5/28/2022  Attending Physician: Isidoro Gee MD   Primary Care Provider: Rose Johnston NP         Patient information was obtained from patient, relative(s) and ER records.     Subjective:     Principal Problem:Pneumonia due to COVID-19 virus    Chief Complaint: No chief complaint on file.       HPI: 89 yo F w/ PMHx of HTN, HLD, CKD3, MDD, remote hx CVA >15 yrs ago, and tremor who was transferred from [facility] for COVID infxn. Pt reports that for the past wk has had a worsening non-productive cough but denies any F, C, SoB, CP, HA, fatigue, muscle aches, N, V, D, or C. She had been hospitalized in 7/21 for bronchitis and given breathing tx and O2 at d/c; however, she was eventually weaned off from home O2. She tried taking left over breathing tx at home for her cough but did not notice any improvement in her sxs. She reports using a walker at home and lives with her son and his family and relies on them for most ADLs except going to the bathroom and changing. She was vaccinated against covid but did not receive a booster. She denies any sick contacts or any other sx's at this time.     In the ED pt temp 100 HR 90 RR 18 /58 SpO2 92% on RA. A rapid covid test was positive. CBC unremarkable, CMP w/ elevated BUN/Crt to 43/ 1.3 from baseline 20/0.9. LA initally 3.2 but down trended to 1.4 while in the ED. Troponin was elevated to 0.054 and up to 0.055 as well as BNP to 465. Procal wnl and UA neg. Pt was placed on 2L NC and 1L NS bolus as well as the first dose of remdesivir and dexamethasone. [place] [facility] [place] of her COVID infxn.        Past Medical History:   Diagnosis Date    Anxiety     Depression     Hyperlipidemia     Hypertension     Pneumonia due to COVID-19 virus 5/28/2022    Stroke        Past Surgical  History:   Procedure Laterality Date    APPENDECTOMY  1960    HYSTERECTOMY  1960s    KIDNEY STONE SURGERY  1950s       Review of patient's allergies indicates:   Allergen Reactions    Sulfa (sulfonamide antibiotics) Hives    Penicillins Rash       Current Facility-Administered Medications on File Prior to Encounter   Medication    [COMPLETED] acetaminophen tablet 1,000 mg    [COMPLETED] HYDROcodone-acetaminophen 5-325 mg per tablet 1 tablet    [COMPLETED] ibuprofen tablet 800 mg    [COMPLETED] remdesivir 200 mg in sodium chloride 0.9% 250 mL infusion    [COMPLETED] sodium chloride 0.9% bolus 1,365 mL    [DISCONTINUED] dexamethasone injection 6 mg    [DISCONTINUED] remdesivir 100 mg in sodium chloride 0.9% 250 mL infusion     Current Outpatient Medications on File Prior to Encounter   Medication Sig    albuterol-ipratropium (DUO-NEB) 2.5 mg-0.5 mg/3 mL nebulizer solution Take 3 mLs by nebulization every 6 (six) hours as needed for Wheezing. Rescue    alendronate (FOSAMAX) 70 MG tablet TAKE ONE TABLET BY MOUTH ONCE EVERY 7 DAYS    amLODIPine (NORVASC) 2.5 MG tablet TAKE ONE TABLET BY MOUTH ONCE A DAY    aspirin (ECOTRIN) 81 MG EC tablet Take 1 tablet by mouth Daily.    citalopram (CELEXA) 10 MG tablet TAKE ONE TABLET BY MOUTH ONCE A DAY    clonazePAM (KLONOPIN) 0.25 MG TbDL Take 1 tablet (0.25 mg total) by mouth once daily.    clotrimazole-betamethasone 1-0.05% (LOTRISONE) cream Apply topically 2 (two) times daily.    gentamicin (GARAMYCIN) 0.1 % ointment Apply topically 3 (three) times daily.    lidocaine (LIDODERM) 5 % Place 1 patch onto the skin once daily. Remove & Discard patch within 12 hours or as directed by MD    lisinopriL (PRINIVIL,ZESTRIL) 40 MG tablet TAKE ONE TABLET BY MOUTH ONCE A DAY    metoprolol succinate (TOPROL-XL) 25 MG 24 hr tablet TAKE ONE TABLET BY MOUTH ONCE A DAY    nystatin (MYCOSTATIN) powder Apply topically 2 (two) times daily.    primidone (MYSOLINE) 50 MG Tab  TAKE ONE TABLET BY MOUTH THREE TIMES DAILY    simvastatin (ZOCOR) 40 MG tablet Take 1 tablet (40 mg total) by mouth every evening.    spironolactone-hydrochlorothiazide 25-25mg (ALDACTAZIDE) 25-25 mg Tab TAKE ONE TABLET BY MOUTH ONCE A DAY    [DISCONTINUED] collagenase (SANTYL) ointment Apply topically once daily.     Family History       Problem Relation (Age of Onset)    Arthritis Daughter    Cancer Brother, Son    Dementia Brother    Heart attack Father    Heart disease Father    Hypertension Daughter, Brother, Son, Daughter    Kidney disease Daughter    No Known Problems Mother    Parkinsonism Brother, Son    Restless legs syndrome Daughter    Thyroid disease Daughter          Tobacco Use    Smoking status: Never Smoker    Smokeless tobacco: Never Used   Substance and Sexual Activity    Alcohol use: No    Drug use: No    Sexual activity: Never     Review of Systems   Constitutional:  Negative for activity change, appetite change, chills, fatigue and fever.   HENT:  Positive for congestion and hearing loss (L ear hard of hearing for several years). Negative for rhinorrhea, sinus pressure, sinus pain, sneezing, sore throat and trouble swallowing.    Eyes:  Negative for photophobia and visual disturbance.   Respiratory:  Positive for cough. Negative for apnea, shortness of breath and wheezing.    Cardiovascular:  Negative for chest pain, palpitations and leg swelling.   Gastrointestinal:  Negative for abdominal distention, abdominal pain, blood in stool, constipation, diarrhea, nausea and vomiting.   Genitourinary:  Negative for dysuria, hematuria and urgency.   Musculoskeletal:  Positive for back pain (Chronic). Negative for joint swelling and myalgias.   Skin:  Negative for wound.   Neurological:  Positive for tremors (Chronic facial twitching). Negative for dizziness, facial asymmetry, weakness, light-headedness, numbness and headaches.   Psychiatric/Behavioral:  The patient is not nervous/anxious.     Objective:     Vital Signs (Most Recent):  Temp: 99.2 °F (37.3 °C) (05/28/22 1136)  Pulse: 90 (05/28/22 1239)  Resp: 20 (05/28/22 1239)  BP: (!) 161/78 (05/28/22 1136)  SpO2: (!) 92 % (05/28/22 1239)   Vital Signs (24h Range):  Temp:  [98.6 °F (37 °C)-100.2 °F (37.9 °C)] 99.2 °F (37.3 °C)  Pulse:  [51-90] 90  Resp:  [16-22] 20  SpO2:  [91 %-99 %] 92 %  BP: (104-161)/(52-78) 161/78     Weight: 82.9 kg (182 lb 12.2 oz)  Body mass index is 35.69 kg/m².    Physical Exam  Vitals and nursing note reviewed.   Constitutional:       General: She is not in acute distress.     Appearance: Normal appearance. She is not toxic-appearing.   HENT:      Head: Normocephalic and atraumatic.      Right Ear: External ear normal.      Left Ear: External ear normal.      Nose: Nose normal. No congestion.      Mouth/Throat:      Mouth: Mucous membranes are moist.   Eyes:      Conjunctiva/sclera: Conjunctivae normal.      Pupils: Pupils are equal, round, and reactive to light.   Cardiovascular:      Rate and Rhythm: Normal rate and regular rhythm.      Pulses: Normal pulses.      Heart sounds: Normal heart sounds. No murmur heard.    No friction rub. No gallop.   Pulmonary:      Effort: Pulmonary effort is normal. No respiratory distress.      Breath sounds: Normal breath sounds. No wheezing, rhonchi or rales.      Comments: On 3 L NC  Chest:      Chest wall: No tenderness.   Abdominal:      General: Bowel sounds are normal.      Palpations: There is no mass.      Tenderness: There is no abdominal tenderness.   Musculoskeletal:         General: No swelling or tenderness. Normal range of motion.      Cervical back: Normal range of motion.      Right lower leg: No edema.      Left lower leg: No edema.   Lymphadenopathy:      Cervical: No cervical adenopathy.   Skin:     General: Skin is warm and dry.      Capillary Refill: Capillary refill takes less than 2 seconds.      Findings: No rash.   Neurological:      General: No focal deficit  present.      Mental Status: She is alert and oriented to person, place, and time.      Cranial Nerves: No cranial nerve deficit.      Sensory: No sensory deficit.      Motor: No weakness.      Coordination: Coordination normal.      Comments: Pt with decreased hearing in L ear from previous shingles infxn as well as difficulty speaking and subjective R sided weakness residual from CVA          Recent Labs   Lab 05/27/22  1438   WBC 6.81   HGB 11.0*   HCT 34.4*   *   RBC 3.44*   MCH 32.0*   MCHC 32.0   RDW 16.4*      MPV 9.3   GRAN 55.6  3.8   LYMPH 25.8  1.8   MONO 17.9*  1.2*   EOSINOPHIL 0.1   BASOPHIL 0.3     Recent Labs   Lab 05/27/22  1438      K 3.7   CL 99   CO2 26   ANIONGAP 16   BUN 43*   CREATININE 1.3   *   CALCIUM 8.8   PROT 7.5   ALBUMIN 3.1*   ALKPHOS 80   BILITOT 0.2   ALT 10   AST 15   ESTGFRAFRICA 41*   EGFRNONAA 36*     Recent Labs   Lab 05/27/22  1448   COLORU Yellow   APPEARANCEUA Clear   PHUR 6.0   SPECGRAV 1.020   PROTEINUA Negative   GLUCUA Negative   KETONESU Negative   BILIRUBINUA Negative   OCCULTUA Negative   UROBILINOGEN Negative   NITRITE Negative   LEUKOCYTESUR Negative     Recent Labs   Lab 05/27/22  1438 05/27/22  1640 05/28/22  1232   TROPONINI 0.054* 0.055* 0.075*     Recent Labs   Lab 05/28/22  1233   INR 1.1   APTT 29.1     No results for input(s): TSH, N8WJCWK, D7WQAUW, THYROIDAB, FREET4 in the last 168 hours.  X-Ray Chest 1 View    Result Date: 5/27/2022  EXAMINATION: XR CHEST 1 VIEW CLINICAL HISTORY: shortness of breath; TECHNIQUE: Single frontal view of the chest was performed. COMPARISON: 03/15/2022 FINDINGS: The lungs are under expanded with crowding of the pulmonary vascularity.  There is bibasilar patchy opacities which could reflect atelectasis, aspiration or pneumonia.  Nodular opacity in the left hilar region not as clearly seen on today's study.  The heart is enlarged.  Calcified atheromatous disease affects the tortuous aorta.  Skeletal  structures are intact.     As above. Electronically signed by: Inna Patterson MD Date:    05/27/2022 Time:    14:56    Microbiology Results (last 7 days)       ** No results found for the last 168 hours. **              Assessment/Plan:     * Pneumonia due to COVID-19 virus  Pt w/ a new worsening nonproductive cough for 1 wk PTA w/o relief from breathing tx at home  Pt denies any other sx's including SoB  Rapid COVID from ED positive  LA initially elevated but down trended w/ fluids  Procal wnl  ESR elevated to 84 as well as CRP to 52.4  CXR in ED with under expanded lungs and bibasilar patchy opacities  Pt requiring supplemental O2 via NC at admission and wears none at home  Given first doses of remdesivir and dexamethasone while still in ED    Plan:  Monitor O2 sat, maintain >90%, and adjust supplementation as necessary  Airborne, droplet, and contact precautions in place  Cnt remdesivir 100mg for 4 days ending 6/2 and Dexamethasone 6mg for 10 days ending 6/7        Elevated troponin  Troponin elevated while in ED to 0.054 then 0.055   Pt w/o any new EKG changes or complaints of CP    Plan:  Trend trop and EKG      CKD (chronic kidney disease), stage III  Pt noted to have CKD 3 w/ gfr 56 previous but 36 on admit   JUAN FRANCISCO on admit w. Elevated BUN/crt 43/1.3 from BL 20/0.9  Pt denies any urinary sx's or decreased urination    Plan:  Urine studies pending  Will hold nephrotoxic drugs and renally dose medications as necessary      JUAN FRANCISCO (acute kidney injury)  See CKD 3      HTN (hypertension)  Pt compliant with home antihtn including amlodipine 2.5 qd, lisinopril 40mg qd, metoprolol succinate 2mg 24hr qd, and spironolactone-hydrochlorathiazide 25-25mg qd    Plan:  Cnt to monitor vitals  Cnt home amlodipine and metoprolol but will hold diuretics in setting of JUAN FRANCISCO  Will titrate medications as needed and add hydralazine prn for SBP >180      History of CVA (cerebrovascular accident)  Pt had CVA w/ thorough Neuro F/U last seen  8/21 w/ rec to rtc in 1 yr  Pt denies any new neurological complaints or deficits at time of admit  Noted chronic difficulty speaking and subjective R sided weakness  Last Neuro notes new MRI with only old lacunar infarct changes   Per last Neuro recs cnt home asa and statin    Plan:  Cnt home asa and statin          Mild episode of recurrent major depressive disorder  Cnt home Citalopram 10mg daily         Hyperlipidemia  Pt on simvastatin 40 qhs at home    Plan:  Will start equivalent atorvastatin 20 qhs while inpt      Tremor  Cnt home primidone 50mg TID as well as metoprolol      VTE Risk Mitigation (From admission, onward)         Ordered     enoxaparin injection 80 mg  Every 24 hours (non-standard times)         05/28/22 7617                   Forrest Eller MD  Department of Hospital Medicine   [place] - Telemetry

## 2022-05-28 NOTE — ED NOTES
Pt resting comfortably on ED stretcher. NADN. AAOx3. Respirations even and unlabored. Bed locked in lowest position. Call bell within reach. Awaiting EMS transfer.

## 2022-05-28 NOTE — ASSESSMENT & PLAN NOTE
Pt compliant with home antihtn including amlodipine 2.5 qd, lisinopril 40mg qd, metoprolol succinate 2mg 24hr qd, and spironolactone-hydrochlorathiazide 25-25mg qd    Plan:  Cnt to monitor vitals  Cnt home amlodipine and metoprolol but will hold diuretics in setting of JUAN FRANCISCO  Will titrate medications as needed and add hydralazine prn for SBP >180

## 2022-05-28 NOTE — ASSESSMENT & PLAN NOTE
Troponin elevated while in ED to 0.054 then 0.055   Pt w/o any new EKG changes or complaints of CP    Plan:  Trend trop and EKG

## 2022-05-28 NOTE — ASSESSMENT & PLAN NOTE
Pt noted to have CKD 3 w/ gfr 56 previous but 36 on admit   JUAN FRANCISCO on admit w. Elevated BUN/crt 43/1.3 from BL 20/0.9  Pt denies any urinary sx's or decreased urination    Plan:  Urine studies pending  Will hold nephrotoxic drugs and renally dose medications as necessary

## 2022-05-28 NOTE — SUBJECTIVE & OBJECTIVE
Past Medical History:   Diagnosis Date    Anxiety     Depression     Hyperlipidemia     Hypertension     Pneumonia due to COVID-19 virus 5/28/2022    Stroke        Past Surgical History:   Procedure Laterality Date    APPENDECTOMY  1960    HYSTERECTOMY  1960s    KIDNEY STONE SURGERY  1950s       Review of patient's allergies indicates:   Allergen Reactions    Sulfa (sulfonamide antibiotics) Hives    Penicillins Rash       Current Facility-Administered Medications on File Prior to Encounter   Medication    [COMPLETED] acetaminophen tablet 1,000 mg    [COMPLETED] HYDROcodone-acetaminophen 5-325 mg per tablet 1 tablet    [COMPLETED] ibuprofen tablet 800 mg    [COMPLETED] remdesivir 200 mg in sodium chloride 0.9% 250 mL infusion    [COMPLETED] sodium chloride 0.9% bolus 1,365 mL    [DISCONTINUED] dexamethasone injection 6 mg    [DISCONTINUED] remdesivir 100 mg in sodium chloride 0.9% 250 mL infusion     Current Outpatient Medications on File Prior to Encounter   Medication Sig    albuterol-ipratropium (DUO-NEB) 2.5 mg-0.5 mg/3 mL nebulizer solution Take 3 mLs by nebulization every 6 (six) hours as needed for Wheezing. Rescue    alendronate (FOSAMAX) 70 MG tablet TAKE ONE TABLET BY MOUTH ONCE EVERY 7 DAYS    amLODIPine (NORVASC) 2.5 MG tablet TAKE ONE TABLET BY MOUTH ONCE A DAY    aspirin (ECOTRIN) 81 MG EC tablet Take 1 tablet by mouth Daily.    citalopram (CELEXA) 10 MG tablet TAKE ONE TABLET BY MOUTH ONCE A DAY    clonazePAM (KLONOPIN) 0.25 MG TbDL Take 1 tablet (0.25 mg total) by mouth once daily.    clotrimazole-betamethasone 1-0.05% (LOTRISONE) cream Apply topically 2 (two) times daily.    gentamicin (GARAMYCIN) 0.1 % ointment Apply topically 3 (three) times daily.    lidocaine (LIDODERM) 5 % Place 1 patch onto the skin once daily. Remove & Discard patch within 12 hours or as directed by MD    lisinopriL (PRINIVIL,ZESTRIL) 40 MG tablet TAKE ONE TABLET BY MOUTH ONCE A DAY    metoprolol succinate (TOPROL-XL) 25 MG 24  hr tablet TAKE ONE TABLET BY MOUTH ONCE A DAY    nystatin (MYCOSTATIN) powder Apply topically 2 (two) times daily.    primidone (MYSOLINE) 50 MG Tab TAKE ONE TABLET BY MOUTH THREE TIMES DAILY    simvastatin (ZOCOR) 40 MG tablet Take 1 tablet (40 mg total) by mouth every evening.    spironolactone-hydrochlorothiazide 25-25mg (ALDACTAZIDE) 25-25 mg Tab TAKE ONE TABLET BY MOUTH ONCE A DAY    [DISCONTINUED] collagenase (SANTYL) ointment Apply topically once daily.     Family History       Problem Relation (Age of Onset)    Arthritis Daughter    Cancer Brother, Son    Dementia Brother    Heart attack Father    Heart disease Father    Hypertension Daughter, Brother, Son, Daughter    Kidney disease Daughter    No Known Problems Mother    Parkinsonism Brother, Son    Restless legs syndrome Daughter    Thyroid disease Daughter          Tobacco Use    Smoking status: Never Smoker    Smokeless tobacco: Never Used   Substance and Sexual Activity    Alcohol use: No    Drug use: No    Sexual activity: Never     Review of Systems   Constitutional:  Negative for activity change, appetite change, chills, fatigue and fever.   HENT:  Positive for congestion and hearing loss (L ear hard of hearing for several years). Negative for rhinorrhea, sinus pressure, sinus pain, sneezing, sore throat and trouble swallowing.    Eyes:  Negative for photophobia and visual disturbance.   Respiratory:  Positive for cough. Negative for apnea, shortness of breath and wheezing.    Cardiovascular:  Negative for chest pain, palpitations and leg swelling.   Gastrointestinal:  Negative for abdominal distention, abdominal pain, blood in stool, constipation, diarrhea, nausea and vomiting.   Genitourinary:  Negative for dysuria, hematuria and urgency.   Musculoskeletal:  Positive for back pain (Chronic). Negative for joint swelling and myalgias.   Skin:  Negative for wound.   Neurological:  Positive for tremors (Chronic facial twitching). Negative for  dizziness, facial asymmetry, weakness, light-headedness, numbness and headaches.   Psychiatric/Behavioral:  The patient is not nervous/anxious.    Objective:     Vital Signs (Most Recent):  Temp: 99.2 °F (37.3 °C) (05/28/22 1136)  Pulse: 90 (05/28/22 1239)  Resp: 20 (05/28/22 1239)  BP: (!) 161/78 (05/28/22 1136)  SpO2: (!) 92 % (05/28/22 1239)   Vital Signs (24h Range):  Temp:  [98.6 °F (37 °C)-100.2 °F (37.9 °C)] 99.2 °F (37.3 °C)  Pulse:  [51-90] 90  Resp:  [16-22] 20  SpO2:  [91 %-99 %] 92 %  BP: (104-161)/(52-78) 161/78     Weight: 82.9 kg (182 lb 12.2 oz)  Body mass index is 35.69 kg/m².    Physical Exam  Vitals and nursing note reviewed.   Constitutional:       General: She is not in acute distress.     Appearance: Normal appearance. She is not toxic-appearing.   HENT:      Head: Normocephalic and atraumatic.      Right Ear: External ear normal.      Left Ear: External ear normal.      Nose: Nose normal. No congestion.      Mouth/Throat:      Mouth: Mucous membranes are moist.   Eyes:      Conjunctiva/sclera: Conjunctivae normal.      Pupils: Pupils are equal, round, and reactive to light.   Cardiovascular:      Rate and Rhythm: Normal rate and regular rhythm.      Pulses: Normal pulses.      Heart sounds: Normal heart sounds. No murmur heard.    No friction rub. No gallop.   Pulmonary:      Effort: Pulmonary effort is normal. No respiratory distress.      Breath sounds: Normal breath sounds. No wheezing, rhonchi or rales.      Comments: On 3 L NC  Chest:      Chest wall: No tenderness.   Abdominal:      General: Bowel sounds are normal.      Palpations: There is no mass.      Tenderness: There is no abdominal tenderness.   Musculoskeletal:         General: No swelling or tenderness. Normal range of motion.      Cervical back: Normal range of motion.      Right lower leg: No edema.      Left lower leg: No edema.   Lymphadenopathy:      Cervical: No cervical adenopathy.   Skin:     General: Skin is warm and  dry.      Capillary Refill: Capillary refill takes less than 2 seconds.      Findings: No rash.   Neurological:      General: No focal deficit present.      Mental Status: She is alert and oriented to person, place, and time.      Cranial Nerves: No cranial nerve deficit.      Sensory: No sensory deficit.      Motor: No weakness.      Coordination: Coordination normal.      Comments: Pt with decreased hearing in L ear from previous shingles infxn as well as difficulty speaking and subjective R sided weakness residual from CVA          Recent Labs   Lab 05/27/22  1438   WBC 6.81   HGB 11.0*   HCT 34.4*   *   RBC 3.44*   MCH 32.0*   MCHC 32.0   RDW 16.4*      MPV 9.3   GRAN 55.6  3.8   LYMPH 25.8  1.8   MONO 17.9*  1.2*   EOSINOPHIL 0.1   BASOPHIL 0.3     Recent Labs   Lab 05/27/22  1438      K 3.7   CL 99   CO2 26   ANIONGAP 16   BUN 43*   CREATININE 1.3   *   CALCIUM 8.8   PROT 7.5   ALBUMIN 3.1*   ALKPHOS 80   BILITOT 0.2   ALT 10   AST 15   ESTGFRAFRICA 41*   EGFRNONAA 36*     Recent Labs   Lab 05/27/22  1448   COLORU Yellow   APPEARANCEUA Clear   PHUR 6.0   SPECGRAV 1.020   PROTEINUA Negative   GLUCUA Negative   KETONESU Negative   BILIRUBINUA Negative   OCCULTUA Negative   UROBILINOGEN Negative   NITRITE Negative   LEUKOCYTESUR Negative     Recent Labs   Lab 05/27/22  1438 05/27/22  1640 05/28/22  1232   TROPONINI 0.054* 0.055* 0.075*     Recent Labs   Lab 05/28/22  1233   INR 1.1   APTT 29.1     No results for input(s): TSH, Y6EXFLT, S8SZZXX, THYROIDAB, FREET4 in the last 168 hours.  X-Ray Chest 1 View    Result Date: 5/27/2022  EXAMINATION: XR CHEST 1 VIEW CLINICAL HISTORY: shortness of breath; TECHNIQUE: Single frontal view of the chest was performed. COMPARISON: 03/15/2022 FINDINGS: The lungs are under expanded with crowding of the pulmonary vascularity.  There is bibasilar patchy opacities which could reflect atelectasis, aspiration or pneumonia.  Nodular opacity in the left  hilar region not as clearly seen on today's study.  The heart is enlarged.  Calcified atheromatous disease affects the tortuous aorta.  Skeletal structures are intact.     As above. Electronically signed by: Inna Patterson MD Date:    05/27/2022 Time:    14:56    Microbiology Results (last 7 days)       ** No results found for the last 168 hours. **

## 2022-05-28 NOTE — HPI
89 yo F w/ PMHx of HTN, HLD, CKD3, MDD, remote hx CVA >15 yrs ago, and tremor who was transferred from [facility] for COVID infxn. Pt reports that for the past wk has had a worsening non-productive cough but denies any F, C, SoB, CP, HA, fatigue, muscle aches, N, V, D, or C. She had been hospitalized in 7/21 for bronchitis and given breathing tx and O2 at d/c; however, she was eventually weaned off from home O2. She tried taking left over breathing tx at home for her cough but did not notice any improvement in her sxs. She reports using a walker at home and lives with her son and his family and relies on them for most ADLs except going to the bathroom and changing. She was vaccinated against covid but did not receive a booster. She denies any sick contacts or any other sx's at this time.     In the ED pt temp 100 HR 90 RR 18 /58 SpO2 92% on RA. A rapid covid test was positive. CBC unremarkable, CMP w/ elevated BUN/Crt to 43/ 1.3 from baseline 20/0.9. LA initally 3.2 but down trended to 1.4 while in the ED. Troponin was elevated to 0.054 and up to 0.055 as well as BNP to 465. Procal wnl and UA neg. Pt was placed on 2L NC and 1L NS bolus as well as the first dose of remdesivir and dexamethasone. Pt was transferred to [facility] for further tx of her COVID infxn.

## 2022-05-28 NOTE — PLAN OF CARE
Plan of care reviewed with patient. Patient voiced understanding. NSR on monitor. No acute distress noted. Side rails x 3, bed in lowest position, call bell within reach, pt advised to call for assistance. Maintain bed alarm for patient safety.

## 2022-05-28 NOTE — ED NOTES
Report taken from ALEXUS Ziegler. Pt resting comfortably on ED stretcher. NADN. AAOx3. Respirations even and unlabored. Bed locked in lowest position. Call bell within reach. Awaiting EMS transfer.

## 2022-05-28 NOTE — PLAN OF CARE
VN called pts rooom , spoke with pts son to complete admission questions. Pt is [facility], room does not have [facility].   VN role explained and informed that VN would be working with bedside nurse and the rest of the care team.  Fall risk and bed alarm protocol education provided.  Instructed  to call for assistance and agreeable.  Allowed time for questions. NAD noted.  Will cont to be available as needed.      05/28/22 1304   Admission   Initial VN Admission Questions Complete   Communication Issues? Patient Hearing   Safety/Activity   Safety Promotion/Fall Prevention Fall Risk reviewed with patient/family;instructed to call staff for mobility

## 2022-05-28 NOTE — ASSESSMENT & PLAN NOTE
Pt had CVA w/ thorough Neuro F/U last seen 8/21 w/ rec to rtc in 1 yr  Pt denies any new neurological complaints or deficits at time of admit  Noted chronic difficulty speaking and subjective R sided weakness  Last Neuro notes new MRI with only old lacunar infarct changes   Per last Neuro recs cnt home asa and statin    Plan:  Cnt home asa and statin

## 2022-05-28 NOTE — ASSESSMENT & PLAN NOTE
Pt w/ a new worsening nonproductive cough for 1 wk PTA w/o relief from breathing tx at home  Pt denies any other sx's including SoB  Rapid COVID from ED positive  LA initially elevated but down trended w/ fluids  Procal wnl  ESR elevated to 84 as well as CRP to 52.4  CXR in ED with under expanded lungs and bibasilar patchy opacities  Pt requiring supplemental O2 via NC at admission and wears none at home  Given first doses of remdesivir and dexamethasone while still in ED    Plan:  Monitor O2 sat, maintain >90%, and adjust supplementation as necessary  Airborne, droplet, and contact precautions in place  Albuterol prn   Cnt remdesivir 100mg for 4 days ending 6/2 and Dexamethasone 6mg for 10 days ending 6/7  Vit C and multivitamin supplement

## 2022-05-28 NOTE — PROGRESS NOTES
Pharmacist Renal Dose Adjustment Note    Alysia Ross is a 89 y.o. female being treated with the medication Enoxaparin      Patient Data:    Vital Signs (Most Recent):  Temp: 99.2 °F (37.3 °C) (05/28/22 1136)  Pulse: 74 (05/28/22 1136)  Resp: 18 (05/28/22 1136)  BP: (!) 161/78 (05/28/22 1136)  SpO2: 95 % (05/28/22 1136)   Vital Signs (72h Range):  Temp:  [98.6 °F (37 °C)-100.2 °F (37.9 °C)]   Pulse:  [51-90]   Resp:  [16-22]   BP: (104-161)/(52-78)   SpO2:  [91 %-99 %]      Recent Labs   Lab 05/27/22  1438   CREATININE 1.3     Serum creatinine: 1.3 mg/dL 05/27/22 1438  Estimated creatinine clearance: 26.9 mL/min    Medication: Enoxaparin 80 mg sc q12h will be changed to Enoxaparin 80 mg sc q24h.      Pharmacist's Name: Kenney Wolf  Pharmacist's Extension: [phone]

## 2022-05-29 LAB
ALBUMIN SERPL BCP-MCNC: 2.7 G/DL (ref 3.5–5.2)
ALP SERPL-CCNC: 66 U/L (ref 55–135)
ALT SERPL W/O P-5'-P-CCNC: 14 U/L (ref 10–44)
ANION GAP SERPL CALC-SCNC: 10 MMOL/L (ref 8–16)
AST SERPL-CCNC: 20 U/L (ref 10–40)
BASOPHILS # BLD AUTO: 0.02 K/UL (ref 0–0.2)
BASOPHILS NFR BLD: 0.4 % (ref 0–1.9)
BILIRUB SERPL-MCNC: 0.2 MG/DL (ref 0.1–1)
BUN SERPL-MCNC: 35 MG/DL (ref 10–30)
CALCIUM SERPL-MCNC: 8.6 MG/DL (ref 8.7–10.5)
CHLORIDE SERPL-SCNC: 102 MMOL/L (ref 95–110)
CO2 SERPL-SCNC: 28 MMOL/L (ref 23–29)
CREAT SERPL-MCNC: 1 MG/DL (ref 0.5–1.4)
DIFFERENTIAL METHOD: ABNORMAL
EOSINOPHIL # BLD AUTO: 0.1 K/UL (ref 0–0.5)
EOSINOPHIL NFR BLD: 0.9 % (ref 0–8)
ERYTHROCYTE [DISTWIDTH] IN BLOOD BY AUTOMATED COUNT: 16.3 % (ref 11.5–14.5)
EST. GFR  (AFRICAN AMERICAN): 57 ML/MIN/1.73 M^2
EST. GFR  (NON AFRICAN AMERICAN): 49 ML/MIN/1.73 M^2
GLUCOSE SERPL-MCNC: 89 MG/DL (ref 70–110)
HCT VFR BLD AUTO: 29.9 % (ref 37–48.5)
HGB BLD-MCNC: 9.8 G/DL (ref 12–16)
IMM GRANULOCYTES # BLD AUTO: 0.04 K/UL (ref 0–0.04)
IMM GRANULOCYTES NFR BLD AUTO: 0.7 % (ref 0–0.5)
LYMPHOCYTES # BLD AUTO: 1.2 K/UL (ref 1–4.8)
LYMPHOCYTES NFR BLD: 21.1 % (ref 18–48)
MAGNESIUM SERPL-MCNC: 1.5 MG/DL (ref 1.6–2.6)
MCH RBC QN AUTO: 32.6 PG (ref 27–31)
MCHC RBC AUTO-ENTMCNC: 32.8 G/DL (ref 32–36)
MCV RBC AUTO: 99 FL (ref 82–98)
MONOCYTES # BLD AUTO: 0.8 K/UL (ref 0.3–1)
MONOCYTES NFR BLD: 14.5 % (ref 4–15)
NEUTROPHILS # BLD AUTO: 3.5 K/UL (ref 1.8–7.7)
NEUTROPHILS NFR BLD: 62.4 % (ref 38–73)
NRBC BLD-RTO: 0 /100 WBC
PHOSPHATE SERPL-MCNC: 3.1 MG/DL (ref 2.7–4.5)
PLATELET # BLD AUTO: 167 K/UL (ref 150–450)
PMV BLD AUTO: 9.5 FL (ref 9.2–12.9)
POTASSIUM SERPL-SCNC: 3.6 MMOL/L (ref 3.5–5.1)
PROT SERPL-MCNC: 6.7 G/DL (ref 6–8.4)
RBC # BLD AUTO: 3.01 M/UL (ref 4–5.4)
SODIUM SERPL-SCNC: 140 MMOL/L (ref 136–145)
WBC # BLD AUTO: 5.64 K/UL (ref 3.9–12.7)

## 2022-05-29 PROCEDURE — 11000001 HC ACUTE MED/SURG PRIVATE ROOM

## 2022-05-29 PROCEDURE — 94761 N-INVAS EAR/PLS OXIMETRY MLT: CPT

## 2022-05-29 PROCEDURE — 27000207 HC ISOLATION

## 2022-05-29 PROCEDURE — 36415 COLL VENOUS BLD VENIPUNCTURE: CPT | Performed by: STUDENT IN AN ORGANIZED HEALTH CARE EDUCATION/TRAINING PROGRAM

## 2022-05-29 PROCEDURE — 63600175 PHARM REV CODE 636 W HCPCS: Performed by: STUDENT IN AN ORGANIZED HEALTH CARE EDUCATION/TRAINING PROGRAM

## 2022-05-29 PROCEDURE — 99900035 HC TECH TIME PER 15 MIN (STAT)

## 2022-05-29 PROCEDURE — 84100 ASSAY OF PHOSPHORUS: CPT | Performed by: STUDENT IN AN ORGANIZED HEALTH CARE EDUCATION/TRAINING PROGRAM

## 2022-05-29 PROCEDURE — 63600175 PHARM REV CODE 636 W HCPCS: Performed by: FAMILY MEDICINE

## 2022-05-29 PROCEDURE — 27000221 HC OXYGEN, UP TO 24 HOURS

## 2022-05-29 PROCEDURE — 27100098 HC SPACER

## 2022-05-29 PROCEDURE — 80053 COMPREHEN METABOLIC PANEL: CPT | Performed by: STUDENT IN AN ORGANIZED HEALTH CARE EDUCATION/TRAINING PROGRAM

## 2022-05-29 PROCEDURE — 25000242 PHARM REV CODE 250 ALT 637 W/ HCPCS: Performed by: STUDENT IN AN ORGANIZED HEALTH CARE EDUCATION/TRAINING PROGRAM

## 2022-05-29 PROCEDURE — 83735 ASSAY OF MAGNESIUM: CPT | Performed by: STUDENT IN AN ORGANIZED HEALTH CARE EDUCATION/TRAINING PROGRAM

## 2022-05-29 PROCEDURE — 25000003 PHARM REV CODE 250: Performed by: STUDENT IN AN ORGANIZED HEALTH CARE EDUCATION/TRAINING PROGRAM

## 2022-05-29 PROCEDURE — 94640 AIRWAY INHALATION TREATMENT: CPT

## 2022-05-29 PROCEDURE — 63600175 PHARM REV CODE 636 W HCPCS: Mod: TB | Performed by: STUDENT IN AN ORGANIZED HEALTH CARE EDUCATION/TRAINING PROGRAM

## 2022-05-29 PROCEDURE — 85025 COMPLETE CBC W/AUTO DIFF WBC: CPT | Performed by: STUDENT IN AN ORGANIZED HEALTH CARE EDUCATION/TRAINING PROGRAM

## 2022-05-29 RX ORDER — BENZONATATE 100 MG/1
100 CAPSULE ORAL 3 TIMES DAILY PRN
Status: DISCONTINUED | OUTPATIENT
Start: 2022-05-29 | End: 2022-06-03 | Stop reason: HOSPADM

## 2022-05-29 RX ORDER — MAGNESIUM SULFATE HEPTAHYDRATE 40 MG/ML
2 INJECTION, SOLUTION INTRAVENOUS ONCE
Status: COMPLETED | OUTPATIENT
Start: 2022-05-29 | End: 2022-05-29

## 2022-05-29 RX ADMIN — ALBUTEROL SULFATE 2 PUFF: 90 AEROSOL, METERED RESPIRATORY (INHALATION) at 12:05

## 2022-05-29 RX ADMIN — OXYCODONE HYDROCHLORIDE AND ACETAMINOPHEN 500 MG: 500 TABLET ORAL at 08:05

## 2022-05-29 RX ADMIN — BENZONATATE 100 MG: 100 CAPSULE ORAL at 03:05

## 2022-05-29 RX ADMIN — THERA TABS 1 TABLET: TAB at 08:05

## 2022-05-29 RX ADMIN — CITALOPRAM HYDROBROMIDE 10 MG: 10 TABLET ORAL at 08:05

## 2022-05-29 RX ADMIN — PRIMIDONE 50 MG: 50 TABLET ORAL at 08:05

## 2022-05-29 RX ADMIN — GUAIFENESIN 600 MG: 600 TABLET, EXTENDED RELEASE ORAL at 08:05

## 2022-05-29 RX ADMIN — METOPROLOL SUCCINATE 25 MG: 25 TABLET, EXTENDED RELEASE ORAL at 08:05

## 2022-05-29 RX ADMIN — MAGNESIUM SULFATE 2 G: 2 INJECTION INTRAVENOUS at 08:05

## 2022-05-29 RX ADMIN — ENOXAPARIN SODIUM 80 MG: 100 INJECTION SUBCUTANEOUS at 02:05

## 2022-05-29 RX ADMIN — AMLODIPINE BESYLATE 2.5 MG: 2.5 TABLET ORAL at 08:05

## 2022-05-29 RX ADMIN — REMDESIVIR 100 MG: 100 INJECTION, POWDER, LYOPHILIZED, FOR SOLUTION INTRAVENOUS at 08:05

## 2022-05-29 RX ADMIN — GUAIFENESIN AND DEXTROMETHORPHAN HYDROBROMIDE 1 TABLET: 600; 30 TABLET, EXTENDED RELEASE ORAL at 08:05

## 2022-05-29 RX ADMIN — DEXAMETHASONE 6 MG: 4 TABLET ORAL at 08:05

## 2022-05-29 RX ADMIN — ALBUTEROL SULFATE 2 PUFF: 90 AEROSOL, METERED RESPIRATORY (INHALATION) at 08:05

## 2022-05-29 RX ADMIN — ATORVASTATIN CALCIUM 20 MG: 20 TABLET, FILM COATED ORAL at 08:05

## 2022-05-29 RX ADMIN — PRIMIDONE 50 MG: 50 TABLET ORAL at 02:05

## 2022-05-29 NOTE — PLAN OF CARE
"Plan of care reviewed with the patient. Scheduled medicines given and the patient tolerated well. Fall and safety precautions taken and the standard interventions are in place. On Telemetry monitoring with Sinus bradycardia, no true "red alarms" noted in the shift, no acute distress reported either in the shift. On 3 L Oxygen through Nasal Cannula saturating @ 95%. Advised the patient to call for the assistance. Continued monitoring the patient.  "

## 2022-05-29 NOTE — NURSING
Care plans reviewed. No c/o of SOB or chest pains, no acute distress noted. Patient continues to cough a lot, physician contacted. New orders for Tessalon perls PRN and Mucinex DM given. Patient given PRN cough medicine. Vitals WNL. Home oxygen eval done patient dropped down to 86% when sitting on the edge of the bed, oxygen reapplied and she came up to 93% on 3 LPM. Safety maintained, bed in lowest position, bed alarm on, bed wheels locked, call light within reach. Will continue to monitor.

## 2022-05-29 NOTE — NURSING
Home Oxygen Evaluation    Date Performed: 2022    1) Patient's Home O2 Sat on room air, while at rest: 94%        If O2 sats on room air at rest are 88% or below, patient qualifies. No additional testing needed. Document N/A in steps 2 and 3. If 89% or above, complete steps 2.      2) Patient's O2 Sat on room air while exercisin%        If O2 sats on room air while exercising remain 89% or above patient does not qualify, no further testing needed Document N/A in step 3. If O2 sats on room air while exercising are 88% or below, continue to step 3.      3) Patient's O2 Sat while exercising on O2: 93% at 3 LPM         (Must show improvement from #2 for patients to qualify)    If O2 sats improve on oxygen, patient qualifies for portable oxygen. If not, the patient does not qualify.

## 2022-05-30 LAB
ALBUMIN SERPL BCP-MCNC: 2.7 G/DL (ref 3.5–5.2)
ALP SERPL-CCNC: 72 U/L (ref 55–135)
ALT SERPL W/O P-5'-P-CCNC: 12 U/L (ref 10–44)
ANION GAP SERPL CALC-SCNC: 11 MMOL/L (ref 8–16)
AST SERPL-CCNC: 19 U/L (ref 10–40)
BASOPHILS # BLD AUTO: 0.01 K/UL (ref 0–0.2)
BASOPHILS NFR BLD: 0.2 % (ref 0–1.9)
BILIRUB SERPL-MCNC: 0.3 MG/DL (ref 0.1–1)
BUN SERPL-MCNC: 24 MG/DL (ref 10–30)
CALCIUM SERPL-MCNC: 8.7 MG/DL (ref 8.7–10.5)
CHLORIDE SERPL-SCNC: 101 MMOL/L (ref 95–110)
CO2 SERPL-SCNC: 27 MMOL/L (ref 23–29)
CREAT SERPL-MCNC: 0.8 MG/DL (ref 0.5–1.4)
CREAT UR-MCNC: 72.4 MG/DL (ref 15–325)
DIFFERENTIAL METHOD: ABNORMAL
EOSINOPHIL # BLD AUTO: 0.1 K/UL (ref 0–0.5)
EOSINOPHIL NFR BLD: 1.7 % (ref 0–8)
ERYTHROCYTE [DISTWIDTH] IN BLOOD BY AUTOMATED COUNT: 15.9 % (ref 11.5–14.5)
EST. GFR  (AFRICAN AMERICAN): >60 ML/MIN/1.73 M^2
EST. GFR  (NON AFRICAN AMERICAN): >60 ML/MIN/1.73 M^2
GLUCOSE SERPL-MCNC: 83 MG/DL (ref 70–110)
HCT VFR BLD AUTO: 32.8 % (ref 37–48.5)
HGB BLD-MCNC: 10.7 G/DL (ref 12–16)
IMM GRANULOCYTES # BLD AUTO: 0.01 K/UL (ref 0–0.04)
IMM GRANULOCYTES NFR BLD AUTO: 0.2 % (ref 0–0.5)
LYMPHOCYTES # BLD AUTO: 1.2 K/UL (ref 1–4.8)
LYMPHOCYTES NFR BLD: 25.3 % (ref 18–48)
MAGNESIUM SERPL-MCNC: 1.6 MG/DL (ref 1.6–2.6)
MCH RBC QN AUTO: 32.2 PG (ref 27–31)
MCHC RBC AUTO-ENTMCNC: 32.6 G/DL (ref 32–36)
MCV RBC AUTO: 99 FL (ref 82–98)
MONOCYTES # BLD AUTO: 0.7 K/UL (ref 0.3–1)
MONOCYTES NFR BLD: 15.3 % (ref 4–15)
NEUTROPHILS # BLD AUTO: 2.6 K/UL (ref 1.8–7.7)
NEUTROPHILS NFR BLD: 57.3 % (ref 38–73)
NRBC BLD-RTO: 0 /100 WBC
PHOSPHATE SERPL-MCNC: 3 MG/DL (ref 2.7–4.5)
PLATELET # BLD AUTO: 176 K/UL (ref 150–450)
PMV BLD AUTO: 9.6 FL (ref 9.2–12.9)
POCT GLUCOSE: 111 MG/DL (ref 70–110)
POTASSIUM SERPL-SCNC: 3.6 MMOL/L (ref 3.5–5.1)
PROT SERPL-MCNC: 6.4 G/DL (ref 6–8.4)
RBC # BLD AUTO: 3.32 M/UL (ref 4–5.4)
SODIUM SERPL-SCNC: 139 MMOL/L (ref 136–145)
SODIUM UR-SCNC: 94 MMOL/L (ref 20–250)
UUN UR-MCNC: 970 MG/DL (ref 140–1050)
WBC # BLD AUTO: 4.59 K/UL (ref 3.9–12.7)

## 2022-05-30 PROCEDURE — 83735 ASSAY OF MAGNESIUM: CPT | Performed by: STUDENT IN AN ORGANIZED HEALTH CARE EDUCATION/TRAINING PROGRAM

## 2022-05-30 PROCEDURE — 25000003 PHARM REV CODE 250: Performed by: STUDENT IN AN ORGANIZED HEALTH CARE EDUCATION/TRAINING PROGRAM

## 2022-05-30 PROCEDURE — 84540 ASSAY OF URINE/UREA-N: CPT | Performed by: STUDENT IN AN ORGANIZED HEALTH CARE EDUCATION/TRAINING PROGRAM

## 2022-05-30 PROCEDURE — 82570 ASSAY OF URINE CREATININE: CPT | Performed by: STUDENT IN AN ORGANIZED HEALTH CARE EDUCATION/TRAINING PROGRAM

## 2022-05-30 PROCEDURE — 84300 ASSAY OF URINE SODIUM: CPT | Performed by: STUDENT IN AN ORGANIZED HEALTH CARE EDUCATION/TRAINING PROGRAM

## 2022-05-30 PROCEDURE — 36415 COLL VENOUS BLD VENIPUNCTURE: CPT | Performed by: STUDENT IN AN ORGANIZED HEALTH CARE EDUCATION/TRAINING PROGRAM

## 2022-05-30 PROCEDURE — 97165 OT EVAL LOW COMPLEX 30 MIN: CPT

## 2022-05-30 PROCEDURE — 94640 AIRWAY INHALATION TREATMENT: CPT

## 2022-05-30 PROCEDURE — 97535 SELF CARE MNGMENT TRAINING: CPT

## 2022-05-30 PROCEDURE — 63600175 PHARM REV CODE 636 W HCPCS: Performed by: FAMILY MEDICINE

## 2022-05-30 PROCEDURE — 11000001 HC ACUTE MED/SURG PRIVATE ROOM

## 2022-05-30 PROCEDURE — 99900035 HC TECH TIME PER 15 MIN (STAT)

## 2022-05-30 PROCEDURE — 84100 ASSAY OF PHOSPHORUS: CPT | Performed by: STUDENT IN AN ORGANIZED HEALTH CARE EDUCATION/TRAINING PROGRAM

## 2022-05-30 PROCEDURE — 85025 COMPLETE CBC W/AUTO DIFF WBC: CPT | Performed by: STUDENT IN AN ORGANIZED HEALTH CARE EDUCATION/TRAINING PROGRAM

## 2022-05-30 PROCEDURE — 27000221 HC OXYGEN, UP TO 24 HOURS

## 2022-05-30 PROCEDURE — 97530 THERAPEUTIC ACTIVITIES: CPT

## 2022-05-30 PROCEDURE — 97162 PT EVAL MOD COMPLEX 30 MIN: CPT

## 2022-05-30 PROCEDURE — 27000207 HC ISOLATION

## 2022-05-30 PROCEDURE — 80053 COMPREHEN METABOLIC PANEL: CPT | Performed by: STUDENT IN AN ORGANIZED HEALTH CARE EDUCATION/TRAINING PROGRAM

## 2022-05-30 PROCEDURE — 27100098 HC SPACER

## 2022-05-30 PROCEDURE — 94761 N-INVAS EAR/PLS OXIMETRY MLT: CPT

## 2022-05-30 PROCEDURE — 63600175 PHARM REV CODE 636 W HCPCS: Mod: TB | Performed by: STUDENT IN AN ORGANIZED HEALTH CARE EDUCATION/TRAINING PROGRAM

## 2022-05-30 RX ORDER — IPRATROPIUM BROMIDE AND ALBUTEROL SULFATE 2.5; .5 MG/3ML; MG/3ML
3 SOLUTION RESPIRATORY (INHALATION) EVERY 6 HOURS PRN
Qty: 1 EACH | Refills: 5 | Status: SHIPPED | OUTPATIENT
Start: 2022-05-30 | End: 2023-01-01

## 2022-05-30 RX ORDER — AMLODIPINE BESYLATE 5 MG/1
5 TABLET ORAL DAILY
Status: DISCONTINUED | OUTPATIENT
Start: 2022-05-30 | End: 2022-06-03 | Stop reason: HOSPADM

## 2022-05-30 RX ADMIN — ALBUTEROL SULFATE 2 PUFF: 90 AEROSOL, METERED RESPIRATORY (INHALATION) at 12:05

## 2022-05-30 RX ADMIN — PRIMIDONE 50 MG: 50 TABLET ORAL at 08:05

## 2022-05-30 RX ADMIN — GUAIFENESIN AND DEXTROMETHORPHAN HYDROBROMIDE 1 TABLET: 600; 30 TABLET, EXTENDED RELEASE ORAL at 08:05

## 2022-05-30 RX ADMIN — AMLODIPINE BESYLATE 5 MG: 5 TABLET ORAL at 08:05

## 2022-05-30 RX ADMIN — THERA TABS 1 TABLET: TAB at 08:05

## 2022-05-30 RX ADMIN — OXYCODONE HYDROCHLORIDE AND ACETAMINOPHEN 500 MG: 500 TABLET ORAL at 08:05

## 2022-05-30 RX ADMIN — DEXAMETHASONE 6 MG: 4 TABLET ORAL at 08:05

## 2022-05-30 RX ADMIN — ENOXAPARIN SODIUM 80 MG: 100 INJECTION SUBCUTANEOUS at 01:05

## 2022-05-30 RX ADMIN — PRIMIDONE 50 MG: 50 TABLET ORAL at 10:05

## 2022-05-30 RX ADMIN — ALBUTEROL SULFATE 2 PUFF: 90 AEROSOL, METERED RESPIRATORY (INHALATION) at 09:05

## 2022-05-30 RX ADMIN — METOPROLOL SUCCINATE 25 MG: 25 TABLET, EXTENDED RELEASE ORAL at 08:05

## 2022-05-30 RX ADMIN — OXYCODONE HYDROCHLORIDE AND ACETAMINOPHEN 500 MG: 500 TABLET ORAL at 10:05

## 2022-05-30 RX ADMIN — CITALOPRAM HYDROBROMIDE 10 MG: 10 TABLET ORAL at 08:05

## 2022-05-30 RX ADMIN — REMDESIVIR 100 MG: 100 INJECTION, POWDER, LYOPHILIZED, FOR SOLUTION INTRAVENOUS at 09:05

## 2022-05-30 RX ADMIN — ATORVASTATIN CALCIUM 20 MG: 20 TABLET, FILM COATED ORAL at 08:05

## 2022-05-30 RX ADMIN — PRIMIDONE 50 MG: 50 TABLET ORAL at 03:05

## 2022-05-30 RX ADMIN — GUAIFENESIN AND DEXTROMETHORPHAN HYDROBROMIDE 1 TABLET: 600; 30 TABLET, EXTENDED RELEASE ORAL at 10:05

## 2022-05-30 RX ADMIN — ALBUTEROL SULFATE 2 PUFF: 90 AEROSOL, METERED RESPIRATORY (INHALATION) at 07:05

## 2022-05-30 NOTE — PLAN OF CARE
Problem: Physical Therapy  Goal: Physical Therapy Goal  Description: Goals to be met by: 22     Patient will increase functional independence with mobility by performin. Supine to sit with Minimal Assistance  2. Sit to supine with Minimal Assistance  3. Rolling to Left and Right with Minimal Assistance.  4. Sit to stand transfer with Minimal Assistance  5. Bed to chair transfer with Minimal Assistance using Rolling Walker  6. Gait  x 50 feet with Minimal Assistance using Rolling Walker.     Outcome: Ongoing, Progressing     PT Eval completed, note to follow. Pt requiring Mod-MaxA x 1-2 for all mobility. Pt was able to side step R toward HOB ~ 5 ft with RW and ModA. Recommending SNF placement upon d/c.

## 2022-05-30 NOTE — PT/OT/SLP EVAL
Occupational Therapy   Evaluation    Name: Alysia Ross  MRN: [id number]  Admitting Diagnosis:  Pneumonia due to COVID-19 virus  Recent Surgery: * No surgery found *      Recommendations:     Discharge Recommendations: nursing facility, skilled  Discharge Equipment Recommendations:   (TBD)  Barriers to discharge:  None    Assessment:     Alysia Ross is a 89 y.o. female with a medical diagnosis of Pneumonia due to COVID-19 virus.  She presents with The primary encounter diagnosis was Hypoxia. Diagnoses of Pneumonia due to COVID-19 virus, COVID, and NSTEMI (non-ST elevated myocardial infarction) were also pertinent to this visit.  . Performance deficits affecting function: weakness, gait instability, impaired balance, impaired endurance, impaired functional mobilty, impaired self care skills, pain, impaired skin, impaired cognition, decreased coordination, decreased ROM, decreased upper extremity function, decreased lower extremity function, impaired coordination, decreased safety awareness.      Pt would benefit from cont OT services in order to maximize functional independence. Recommending SNF at/d/c     Rehab Prognosis: Good; patient would benefit from acute skilled OT services to address these deficits and reach maximum level of function.       Plan:     Patient to be seen 5 x/week to address the above listed problems via self-care/home management, therapeutic activities, therapeutic exercises  · Plan of Care Expires: 06/30/22  · Plan of Care Reviewed with: patient    Subjective     Chief Complaint: pt reporting pain in back  Patient/Family Comments/goals: return to PLOF     Occupational Profile:  Living Environment: obtained from BAYLEE on phone; lives with son and DIL in [facility], [facility] to enter, w/c accessible home, [facility] with built in seat and hand held shower   Previous level of function: BAYLEE reports pt ambulates with rollator, assisted only with bathing; mod I/supervision for all other ADLs   Equipment  Used at Home:  rollator, shower chair (access to [facility] but not currently utilizing; sleeps in adjsutable bed)  Assistance upon Discharge: from family- reports 24/7 care    Pain/Comfort:  · Pain Rating 1: 10/10  · Location - Orientation 1: generalized  · Location 1: back  · Pain Addressed 1: Reposition, Distraction, Cessation of Activity  · Pain Rating Post-Intervention 1: 10/10    Patients cultural, spiritual, Bahai conflicts given the current situation:      Objective:     Communicated with: nsdalia prior to session.   General Precautions: Standard, fall   Orthopedic Precautions:N/A   Braces: N/A    Occupational Performance:    Bed Mobility:    · Patient completed Rolling/Turning to Left with  moderate assistance  · Patient completed Rolling/Turning to Right with moderate assistance  · Patient completed Scooting/Bridging with maximal assistance  · Patient completed Supine to Sit with moderate assistance, maximal assistance and 2 persons  · Patient completed Sit to Supine with moderate assistance, maximal assistance and 2 persons    Functional Mobility/Transfers:  · Patient completed Sit <> Stand Transfer with moderate assistance, maximal assistance and of 2 persons  with  rolling walker   · Functional Mobility: mod A with RW management laterally steps to HOB     Activities of Daily Living:  · Feeding:  stand by assistance seated EOB   · Lower Body Dressing: total assistance    · Toileting: dependence soiled brief     Cognitive/Visual Perceptual:  Oriented  Deaf in L ear   Impaired LTM/STM       Physical Exam:  Balance:    -       fair/fair - sitting; poor standing   Postural examination/scapula alignment:    -       Rounded shoulders  -       Forward head  -       Abnormal trunk flexion  -       Kyphosis  Dominant hand:    -       right  Upper Extremity Range of Motion:   BUE WFL for pt's needs   Upper Extremity Strength:  BUE grossly 3+/5 based on function; difficulty with assessment    Strength:  4-/5    [facility]  6 Click ADL:  AMPA Total Score: 13    Treatment & Education:  Pt found supine; R lateral leaning in bed and reports of pain   Bed mobility as above   Requiring increased assist to stand from EOB; unsuccessful attempt at first stand; requires tactile and verbal cues for upright stance; urinated in stance in brief   Lateral steps taken to HOB with RW   Returned to supine with increased assist   Rolling performed for change of brief and hygiene ; total A toileting   Assist with rolling   Repositioned with wedge to L and nsg notified   Education:    Patient left left sidelying with all lines intact, call button in reach, bed alarm on and nsg notified    GOALS:   Multidisciplinary Problems     Occupational Therapy Goals        Problem: Occupational Therapy    Goal Priority Disciplines Outcome Interventions   Occupational Therapy Goal     OT, PT/OT Ongoing, Progressing    Description: Goals to be met by: 6/30/22     Patient will increase functional independence with ADLs by performing:    LE Dressing with Minimal Assistance.  Grooming while standing with Contact Guard Assistance.  Toileting from bedside commode with Contact Guard Assistance for hygiene and clothing management.   Supine to sit with Contact Guard Assistance.  Step transfer with Contact Guard Assistance  Toilet transfer to bedside commode with Contact Guard Assistance.  Increased functional strength to [facility] for self care skills and functional mobility.  Upper extremity exercise program x10 reps per handout, with independence.                     History:     Past Medical History:   Diagnosis Date    Anxiety     Depression     Hyperlipidemia     Hypertension     Pneumonia due to COVID-19 virus 5/28/2022    Stroke        Past Surgical History:   Procedure Laterality Date    APPENDECTOMY  1960    HYSTERECTOMY  1960s    KIDNEY STONE SURGERY  1950s       Time Tracking:     OT Date of Treatment: 05/30/22  OT Start Time: 1118  OT Stop Time: 1158  OT Total  Time (min): 40 min    Billable Minutes:Evaluation 8  Self Care/Home Management 13 co tx with PT     5/30/2022

## 2022-05-30 NOTE — PROGRESS NOTES
[facility] Medicine  Progress Note    Patient Name: Alysia Ross  MRN: [id number]  Patient Class: IP- Inpatient   Admission Date: 5/28/2022  Length of Stay: 2 days  Attending Physician: Nadiya Capellan MD  Primary Care Provider: Rose Johnston NP        Subjective:     Principal Problem:Pneumonia due to COVID-19 virus        HPI:  89 yo F w/ PMHx of HTN, HLD, CKD3, MDD, remote hx CVA >15 yrs ago, and tremor who was transferred from [facility] for COVID infxn. Pt reports that for the past wk has had a worsening non-productive cough but denies any F, C, SoB, CP, HA, fatigue, muscle aches, N, V, D, or C. She had been hospitalized in 7/21 for bronchitis and given breathing tx and O2 at d/c; however, she was eventually weaned off from home O2. She tried taking left over breathing tx at home for her cough but did not notice any improvement in her sxs. She reports using a walker at home and lives with her son and his family and relies on them for most ADLs except going to the bathroom and changing. She was vaccinated against covid but did not receive a booster. She denies any sick contacts or any other sx's at this time.     In the ED pt temp 100 HR 90 RR 18 /58 SpO2 92% on RA. A rapid covid test was positive. CBC unremarkable, CMP w/ elevated BUN/Crt to 43/ 1.3 from baseline 20/0.9. LA initally 3.2 but down trended to 1.4 while in the ED. Troponin was elevated to 0.054 and up to 0.055 as well as BNP to 465. Procal wnl and UA neg. Pt was placed on 2L NC and 1L NS bolus as well as the first dose of remdesivir and dexamethasone. Pt was transferred to [facility] for further tx of her COVID infxn.        Overview/Hospital Course:  No notes on file    Interval History: NAEON. Pt doing well overall with complaint still of cough and now feelings of restlessness in bed. Will try and have PT come eval today and help her move about the room. No other issues at this time.    Review of Systems    Constitutional:  Negative for activity change, appetite change, chills, fatigue and fever.   HENT:  Positive for congestion and hearing loss (L ear hard of hearing for several years). Negative for rhinorrhea, sinus pressure, sinus pain, sneezing, sore throat and trouble swallowing.    Eyes:  Negative for photophobia and visual disturbance.   Respiratory:  Positive for cough. Negative for apnea, shortness of breath and wheezing.    Cardiovascular:  Negative for chest pain, palpitations and leg swelling.   Gastrointestinal:  Negative for abdominal distention, abdominal pain, blood in stool, constipation, diarrhea, nausea and vomiting.   Genitourinary:  Negative for dysuria, hematuria and urgency.   Musculoskeletal:  Positive for back pain (Chronic). Negative for joint swelling and myalgias.   Skin:  Negative for wound.   Neurological:  Positive for tremors (Chronic facial twitching). Negative for dizziness, facial asymmetry, weakness, light-headedness, numbness and headaches.   Psychiatric/Behavioral:  The patient is not nervous/anxious.    Objective:     Vital Signs (Most Recent):  Temp: 98.9 °F (37.2 °C) (05/30/22 1115)  Pulse: 65 (05/30/22 1223)  Resp: 16 (05/30/22 1223)  BP: (!) 117/57 (05/30/22 1115)  SpO2: 95 % (05/30/22 1223)   Vital Signs (24h Range):  Temp:  [98.1 °F (36.7 °C)-99.5 °F (37.5 °C)] 98.9 °F (37.2 °C)  Pulse:  [55-81] 65  Resp:  [16-20] 16  SpO2:  [91 %-97 %] 95 %  BP: (117-182)/(57-84) 117/57     Weight: 82.9 kg (182 lb 12.2 oz)  Body mass index is 35.69 kg/m².    Intake/Output Summary (Last 24 hours) at 5/30/2022 1312  Last data filed at 5/30/2022 0400  Gross per 24 hour   Intake 168.35 ml   Output --   Net 168.35 ml      Physical Exam  Vitals and nursing note reviewed.   Constitutional:       General: She is not in acute distress.     Appearance: Normal appearance. She is not toxic-appearing.   HENT:      Head: Normocephalic and atraumatic.      Right Ear: External ear normal.      Left Ear:  External ear normal.      Nose: Nose normal. No congestion.      Mouth/Throat:      Mouth: Mucous membranes are moist.   Eyes:      Conjunctiva/sclera: Conjunctivae normal.      Pupils: Pupils are equal, round, and reactive to light.   Cardiovascular:      Rate and Rhythm: Normal rate and regular rhythm.      Pulses: Normal pulses.      Heart sounds: Normal heart sounds. No murmur heard.    No friction rub. No gallop.   Pulmonary:      Effort: Pulmonary effort is normal. No respiratory distress.      Breath sounds: Normal breath sounds. No wheezing, rhonchi or rales.      Comments: On 3 L NC  Chest:      Chest wall: No tenderness.   Abdominal:      General: Bowel sounds are normal.      Palpations: There is no mass.      Tenderness: There is no abdominal tenderness.   Musculoskeletal:         General: No swelling or tenderness. Normal range of motion.      Cervical back: Normal range of motion.      Right lower leg: No edema.      Left lower leg: No edema.   Lymphadenopathy:      Cervical: No cervical adenopathy.   Skin:     General: Skin is warm and dry.      Capillary Refill: Capillary refill takes less than 2 seconds.      Findings: No rash.   Neurological:      General: No focal deficit present.      Mental Status: She is alert and oriented to person, place, and time.      Cranial Nerves: No cranial nerve deficit.      Sensory: No sensory deficit.      Motor: No weakness.      Coordination: Coordination normal.      Comments: Pt with decreased hearing in L ear from previous shingles infxn as well as difficulty speaking and subjective R sided weakness residual from CVA   Recent Labs   Lab 05/27/22  1438 05/29/22  0439 05/30/22  0813   WBC 6.81 5.64 4.59   HGB 11.0* 9.8* 10.7*   HCT 34.4* 29.9* 32.8*   * 99* 99*   RBC 3.44* 3.01* 3.32*   MCH 32.0* 32.6* 32.2*   MCHC 32.0 32.8 32.6   RDW 16.4* 16.3* 15.9*    167 176   MPV 9.3 9.5 9.6   GRAN 55.6  3.8 62.4  3.5 57.3  2.6   LYMPH 25.8  1.8 21.1   1.2 25.3  1.2   MONO 17.9*  1.2* 14.5  0.8 15.3*  0.7   EOSINOPHIL 0.1 0.9 1.7   BASOPHIL 0.3 0.4 0.2     Recent Labs   Lab 05/27/22  1438 05/29/22  0439 05/30/22  0813    140 139   K 3.7 3.6 3.6   CL 99 102 101   CO2 26 28 27   ANIONGAP 16 10 11   BUN 43* 35* 24   CREATININE 1.3 1.0 0.8   * 89 83   CALCIUM 8.8 8.6* 8.7   PROT 7.5 6.7 6.4   ALBUMIN 3.1* 2.7* 2.7*   ALKPHOS 80 66 72   BILITOT 0.2 0.2 0.3   ALT 10 14 12   AST 15 20 19   ESTGFRAFRICA 41* 57* >60   EGFRNONAA 36* 49* >60   MG  --  1.5* 1.6   PHOS  --  3.1 3.0     Recent Labs   Lab 05/27/22  1448   COLORU Yellow   APPEARANCEUA Clear   PHUR 6.0   SPECGRAV 1.020   PROTEINUA Negative   GLUCUA Negative   KETONESU Negative   BILIRUBINUA Negative   OCCULTUA Negative   UROBILINOGEN Negative   NITRITE Negative   LEUKOCYTESUR Negative     Recent Labs   Lab 05/27/22  1438 05/27/22  1640 05/28/22  1232 05/28/22  1918   TROPONINI 0.054* 0.055* 0.075* 0.072*     Recent Labs   Lab 05/28/22  1233   INR 1.1   APTT 29.1     No results for input(s): TSH, H0AWFKN, V6DAJPK, THYROIDAB, FREET4 in the last 168 hours.  X-Ray Chest 1 View    Result Date: 5/27/2022  EXAMINATION: XR CHEST 1 VIEW CLINICAL HISTORY: shortness of breath; TECHNIQUE: Single frontal view of the chest was performed. COMPARISON: 03/15/2022 FINDINGS: The lungs are under expanded with crowding of the pulmonary vascularity.  There is bibasilar patchy opacities which could reflect atelectasis, aspiration or pneumonia.  Nodular opacity in the left hilar region not as clearly seen on today's study.  The heart is enlarged.  Calcified atheromatous disease affects the tortuous aorta.  Skeletal structures are intact.     As above. Electronically signed by: Inna Patterson MD Date:    05/27/2022 Time:    14:56    Microbiology Results (last 7 days)       ** No results found for the last 168 hours. **                Assessment/Plan:      * Pneumonia due to COVID-19 virus  Pt w/ a new worsening  nonproductive cough for 1 wk PTA w/o relief from breathing tx at home  Pt denies any other sx's including SoB  Rapid COVID from ED positive  LA initially elevated but down trended w/ fluids  Procal wnl  ESR elevated to 84 as well as CRP to 52.4  CXR in ED with under expanded lungs and bibasilar patchy opacities  Pt requiring supplemental O2 via NC at admission and wears none at home  Given first doses of remdesivir and dexamethasone while still in ED    Plan:  Monitor O2 sat, maintain >90%, and adjust supplementation as necessary  Airborne, droplet, and contact precautions in place  Albuterol prn   Cnt remdesivir 100mg for 4 days ending 6/2 and Dexamethasone 6mg for 10 days ending 6/7  Vit C and multivitamin supplement   Incentive spirometry  PT to eval and tx         Elevated troponin  Troponin elevated while in ED to 0.054 pk of 0.075  Pt w/o any new EKG changes or complaints of CP    Plan:  Trend trop and EKG       CKD (chronic kidney disease), stage III  Pt noted to have CKD 3 w/ gfr 56 previous but 36 on admit   JUAN FRANCISCO on admit w. Elevated BUN/crt 43/1.3 from BL 20/0.9  Pt denies any urinary sx's or decreased urination  BUN/Crt to baseline 5/30    Plan:  Urine studies pending  Will hold nephrotoxic drugs and renally dose medications as necessary      JUAN FRANCISCO (acute kidney injury)  See CKD 3      HTN (hypertension)  Pt compliant with home antihtn including amlodipine 2.5 qd, lisinopril 40mg qd, metoprolol succinate 2mg 24hr qd, and spironolactone-hydrochlorathiazide 25-25mg qd\  Increased home amlodipine to 5qd on 5/30    Plan:  Cnt to monitor vitals  Cnt amlodipine and metoprolol but will hold diuretics in setting of JUAN FRANCISCO  Will titrate medications as needed and add hydralazine prn for SBP >180      History of CVA (cerebrovascular accident)  Pt had CVA w/ thorough Neuro F/U last seen 8/21 w/ rec to rtc in 1 yr  Pt denies any new neurological complaints or deficits at time of admit  Noted chronic difficulty speaking and  subjective R sided weakness  Last Neuro notes new MRI with only old lacunar infarct changes   Per last Neuro recs cnt home asa and statin    Plan:  Cnt home asa and statin          Mild episode of recurrent major depressive disorder  Cnt home Citalopram 10mg daily         Hyperlipidemia  Pt on simvastatin 40 qhs at home    Plan:  Will start equivalent atorvastatin 20 qhs while inpt      Tremor  Cnt home primidone 50mg TID as well as metoprolol        VTE Risk Mitigation (From admission, onward)         Ordered     enoxaparin injection 80 mg  Every 24 hours (non-standard times)         05/28/22 1159                Discharge Planning   SEBASTIÁN:      Code Status: Full Code   Is the patient medically ready for discharge?:     Reason for patient still in hospital (select all that apply): Patient trending condition           Forrest Eller MD  Department of Hospital Medicine   [facility]

## 2022-05-30 NOTE — SUBJECTIVE & OBJECTIVE
Interval History: Patient stating coughing has reduced this morning. Able to also tolerate PO intake. Patient denies being SOB or having chest pain.    Review of Systems   Constitutional:  Negative for activity change, appetite change, chills, fatigue and fever.   HENT:  Positive for congestion and hearing loss (L ear hard of hearing for several years). Negative for rhinorrhea, sinus pressure, sinus pain, sneezing, sore throat and trouble swallowing.    Eyes:  Negative for photophobia and visual disturbance.   Respiratory:  Negative for apnea, cough, shortness of breath and wheezing.    Cardiovascular:  Negative for chest pain, palpitations and leg swelling.   Gastrointestinal:  Negative for abdominal distention, abdominal pain, blood in stool, constipation, diarrhea, nausea and vomiting.   Genitourinary:  Negative for dysuria, hematuria and urgency.   Musculoskeletal:  Positive for back pain (Chronic). Negative for joint swelling and myalgias.   Skin:  Negative for wound.   Neurological:  Negative for dizziness, tremors (Chronic facial twitching), facial asymmetry, weakness, light-headedness, numbness and headaches.   Psychiatric/Behavioral:  The patient is not nervous/anxious.    Objective:     Vital Signs (Most Recent):  Temp: 98.9 °F (37.2 °C) (05/29/22 2036)  Pulse: (!) 58 (05/29/22 2045)  Resp: 20 (05/29/22 2045)  BP: (!) 172/75 (05/29/22 2036)  SpO2: 97 % (05/29/22 2045)   Vital Signs (24h Range):  Temp:  [97.1 °F (36.2 °C)-99.6 °F (37.6 °C)] 98.9 °F (37.2 °C)  Pulse:  [58-81] 58  Resp:  [18-20] 20  SpO2:  [93 %-97 %] 97 %  BP: (134-172)/(64-75) 172/75     Weight: 82.9 kg (182 lb 12.2 oz)  Body mass index is 35.69 kg/m².    Intake/Output Summary (Last 24 hours) at 5/30/2022 0005  Last data filed at 5/29/2022 0907  Gross per 24 hour   Intake --   Output 300 ml   Net -300 ml      Physical Exam  Vitals and nursing note reviewed.   Constitutional:       General: She is not in acute distress.     Appearance:  Normal appearance. She is not toxic-appearing.   HENT:      Head: Normocephalic and atraumatic.      Right Ear: External ear normal.      Left Ear: External ear normal.      Nose: Nose normal. No congestion.      Mouth/Throat:      Mouth: Mucous membranes are moist.   Eyes:      Conjunctiva/sclera: Conjunctivae normal.      Pupils: Pupils are equal, round, and reactive to light.   Cardiovascular:      Rate and Rhythm: Normal rate and regular rhythm.      Pulses: Normal pulses.      Heart sounds: Normal heart sounds. No murmur heard.    No friction rub. No gallop.   Pulmonary:      Effort: Pulmonary effort is normal. No respiratory distress.      Breath sounds: Normal breath sounds. No wheezing, rhonchi or rales.      Comments: On 3 L NC  Chest:      Chest wall: No tenderness.   Abdominal:      General: Bowel sounds are normal.      Palpations: There is no mass.      Tenderness: There is no abdominal tenderness.   Musculoskeletal:         General: No swelling or tenderness. Normal range of motion.      Cervical back: Normal range of motion.      Right lower leg: No edema.      Left lower leg: No edema.   Lymphadenopathy:      Cervical: No cervical adenopathy.   Skin:     General: Skin is warm and dry.      Capillary Refill: Capillary refill takes less than 2 seconds.      Findings: No rash.   Neurological:      General: No focal deficit present.      Mental Status: She is alert and oriented to person, place, and time.      Cranial Nerves: No cranial nerve deficit.      Sensory: No sensory deficit.      Motor: No weakness.      Coordination: Coordination normal.      Comments: Pt with decreased hearing in L ear from previous shingles infxn as well as difficulty speaking and subjective R sided weakness residual from CVA       Significant Labs: All pertinent labs within the past 24 hours have been reviewed.  CBC:   Recent Labs   Lab 05/29/22  0439   WBC 5.64   HGB 9.8*   HCT 29.9*        CMP:   Recent Labs   Lab  05/29/22  0439      K 3.6      CO2 28   GLU 89   BUN 35*   CREATININE 1.0   CALCIUM 8.6*   PROT 6.7   ALBUMIN 2.7*   BILITOT 0.2   ALKPHOS 66   AST 20   ALT 14   ANIONGAP 10   EGFRNONAA 49*     POCT Glucose: No results for input(s): POCTGLUCOSE in the last 48 hours.    Significant Imaging: I have reviewed all pertinent imaging results/findings within the past 24 hours.

## 2022-05-30 NOTE — PLAN OF CARE
Problem: Occupational Therapy  Goal: Occupational Therapy Goal  Description: Goals to be met by: 6/30/22     Patient will increase functional independence with ADLs by performing:    LE Dressing with Minimal Assistance.  Grooming while standing with Contact Guard Assistance.  Toileting from bedside commode with Contact Guard Assistance for hygiene and clothing management.   Supine to sit with Contact Guard Assistance.  Step transfer with Contact Guard Assistance  Toilet transfer to bedside commode with Contact Guard Assistance.  Increased functional strength to WFL for self care skills and functional mobility.  Upper extremity exercise program x10 reps per handout, with independence.    Outcome: Ongoing, Progressing     Pt would benefit from cont OT services in order to maximize functional independence. Recommending SNF at/d/c

## 2022-05-30 NOTE — SUBJECTIVE & OBJECTIVE
Interval History: NAEON. Pt doing well overall with complaint still of cough and now feelings of restlessness in bed. Will try and have PT come eval today and help her move about the room. No other issues at this time.    Review of Systems   Constitutional:  Negative for activity change, appetite change, chills, fatigue and fever.   HENT:  Positive for congestion and hearing loss (L ear hard of hearing for several years). Negative for rhinorrhea, sinus pressure, sinus pain, sneezing, sore throat and trouble swallowing.    Eyes:  Negative for photophobia and visual disturbance.   Respiratory:  Positive for cough. Negative for apnea, shortness of breath and wheezing.    Cardiovascular:  Negative for chest pain, palpitations and leg swelling.   Gastrointestinal:  Negative for abdominal distention, abdominal pain, blood in stool, constipation, diarrhea, nausea and vomiting.   Genitourinary:  Negative for dysuria, hematuria and urgency.   Musculoskeletal:  Positive for back pain (Chronic). Negative for joint swelling and myalgias.   Skin:  Negative for wound.   Neurological:  Positive for tremors (Chronic facial twitching). Negative for dizziness, facial asymmetry, weakness, light-headedness, numbness and headaches.   Psychiatric/Behavioral:  The patient is not nervous/anxious.    Objective:     Vital Signs (Most Recent):  Temp: 98.9 °F (37.2 °C) (05/30/22 1115)  Pulse: 65 (05/30/22 1223)  Resp: 16 (05/30/22 1223)  BP: (!) 117/57 (05/30/22 1115)  SpO2: 95 % (05/30/22 1223)   Vital Signs (24h Range):  Temp:  [98.1 °F (36.7 °C)-99.5 °F (37.5 °C)] 98.9 °F (37.2 °C)  Pulse:  [55-81] 65  Resp:  [16-20] 16  SpO2:  [91 %-97 %] 95 %  BP: (117-182)/(57-84) 117/57     Weight: 82.9 kg (182 lb 12.2 oz)  Body mass index is 35.69 kg/m².    Intake/Output Summary (Last 24 hours) at 5/30/2022 1312  Last data filed at 5/30/2022 0400  Gross per 24 hour   Intake 168.35 ml   Output --   Net 168.35 ml      Physical Exam  Vitals and nursing  note reviewed.   Constitutional:       General: She is not in acute distress.     Appearance: Normal appearance. She is not toxic-appearing.   HENT:      Head: Normocephalic and atraumatic.      Right Ear: External ear normal.      Left Ear: External ear normal.      Nose: Nose normal. No congestion.      Mouth/Throat:      Mouth: Mucous membranes are moist.   Eyes:      Conjunctiva/sclera: Conjunctivae normal.      Pupils: Pupils are equal, round, and reactive to light.   Cardiovascular:      Rate and Rhythm: Normal rate and regular rhythm.      Pulses: Normal pulses.      Heart sounds: Normal heart sounds. No murmur heard.    No friction rub. No gallop.   Pulmonary:      Effort: Pulmonary effort is normal. No respiratory distress.      Breath sounds: Normal breath sounds. No wheezing, rhonchi or rales.      Comments: On 3 L NC  Chest:      Chest wall: No tenderness.   Abdominal:      General: Bowel sounds are normal.      Palpations: There is no mass.      Tenderness: There is no abdominal tenderness.   Musculoskeletal:         General: No swelling or tenderness. Normal range of motion.      Cervical back: Normal range of motion.      Right lower leg: No edema.      Left lower leg: No edema.   Lymphadenopathy:      Cervical: No cervical adenopathy.   Skin:     General: Skin is warm and dry.      Capillary Refill: Capillary refill takes less than 2 seconds.      Findings: No rash.   Neurological:      General: No focal deficit present.      Mental Status: She is alert and oriented to person, place, and time.      Cranial Nerves: No cranial nerve deficit.      Sensory: No sensory deficit.      Motor: No weakness.      Coordination: Coordination normal.      Comments: Pt with decreased hearing in L ear from previous shingles infxn as well as difficulty speaking and subjective R sided weakness residual from CVA   Recent Labs   Lab 05/27/22  1438 05/29/22  0439 05/30/22  0813   WBC 6.81 5.64 4.59   HGB 11.0* 9.8* 10.7*    HCT 34.4* 29.9* 32.8*   * 99* 99*   RBC 3.44* 3.01* 3.32*   MCH 32.0* 32.6* 32.2*   MCHC 32.0 32.8 32.6   RDW 16.4* 16.3* 15.9*    167 176   MPV 9.3 9.5 9.6   GRAN 55.6  3.8 62.4  3.5 57.3  2.6   LYMPH 25.8  1.8 21.1  1.2 25.3  1.2   MONO 17.9*  1.2* 14.5  0.8 15.3*  0.7   EOSINOPHIL 0.1 0.9 1.7   BASOPHIL 0.3 0.4 0.2     Recent Labs   Lab 05/27/22  1438 05/29/22  0439 05/30/22  0813    140 139   K 3.7 3.6 3.6   CL 99 102 101   CO2 26 28 27   ANIONGAP 16 10 11   BUN 43* 35* 24   CREATININE 1.3 1.0 0.8   * 89 83   CALCIUM 8.8 8.6* 8.7   PROT 7.5 6.7 6.4   ALBUMIN 3.1* 2.7* 2.7*   ALKPHOS 80 66 72   BILITOT 0.2 0.2 0.3   ALT 10 14 12   AST 15 20 19   ESTGFRAFRICA 41* 57* >60   EGFRNONAA 36* 49* >60   MG  --  1.5* 1.6   PHOS  --  3.1 3.0     Recent Labs   Lab 05/27/22  1448   COLORU Yellow   APPEARANCEUA Clear   PHUR 6.0   SPECGRAV 1.020   PROTEINUA Negative   GLUCUA Negative   KETONESU Negative   BILIRUBINUA Negative   OCCULTUA Negative   UROBILINOGEN Negative   NITRITE Negative   LEUKOCYTESUR Negative     Recent Labs   Lab 05/27/22  1438 05/27/22  1640 05/28/22  1232 05/28/22  1918   TROPONINI 0.054* 0.055* 0.075* 0.072*     Recent Labs   Lab 05/28/22  1233   INR 1.1   APTT 29.1     No results for input(s): TSH, N1HPTSZ, V0QVLUS, THYROIDAB, FREET4 in the last 168 hours.  X-Ray Chest 1 View    Result Date: 5/27/2022  EXAMINATION: XR CHEST 1 VIEW CLINICAL HISTORY: shortness of breath; TECHNIQUE: Single frontal view of the chest was performed. COMPARISON: 03/15/2022 FINDINGS: The lungs are under expanded with crowding of the pulmonary vascularity.  There is bibasilar patchy opacities which could reflect atelectasis, aspiration or pneumonia.  Nodular opacity in the left hilar region not as clearly seen on today's study.  The heart is enlarged.  Calcified atheromatous disease affects the tortuous aorta.  Skeletal structures are intact.     As above. Electronically signed by: Inna  MD Leonardo Date:    05/27/2022 Time:    14:56    Microbiology Results (last 7 days)       ** No results found for the last 168 hours. **

## 2022-05-30 NOTE — ASSESSMENT & PLAN NOTE
Pt w/ a new worsening nonproductive cough for 1 wk PTA w/o relief from breathing tx at home  Pt denies any other sx's including SoB  Rapid COVID from ED positive  LA initially elevated but down trended w/ fluids  Procal wnl  ESR elevated to 84 as well as CRP to 52.4  CXR in ED with under expanded lungs and bibasilar patchy opacities  Pt requiring supplemental O2 via NC at admission and wears none at home  Given first doses of remdesivir and dexamethasone while still in ED    Plan:  Monitor O2 sat, maintain >90%, and adjust supplementation as necessary  Airborne, droplet, and contact precautions in place  Albuterol prn   Cnt remdesivir 100mg for 4 days ending 6/2 and Dexamethasone 6mg for 10 days ending 6/7  Vit C and multivitamin supplement   Incentive spirometry  PT to eval and tx

## 2022-05-30 NOTE — PROGRESS NOTES
[facility]  Progress Note    Patient Name: Alysia Ross  MRN: [id number]  Patient Class: IP- Inpatient   Admission Date: 5/28/2022  Length of Stay: 2 days  Attending Physician: Isidoro Gee MD  Primary Care Provider: Rose Johnston NP        Subjective:     Principal Problem:Pneumonia due to COVID-19 virus        HPI:  89 yo F w/ PMHx of HTN, HLD, CKD3, MDD, remote hx CVA >15 yrs ago, and tremor who was transferred from [facility] for COVID infxn. Pt reports that for the past wk has had a worsening non-productive cough but denies any F, C, SoB, CP, HA, fatigue, muscle aches, N, V, D, or C. She had been hospitalized in 7/21 for bronchitis and given breathing tx and O2 at d/c; however, she was eventually weaned off from home O2. She tried taking left over breathing tx at home for her cough but did not notice any improvement in her sxs. She reports using a walker at home and lives with her son and his family and relies on them for most ADLs except going to the bathroom and changing. She was vaccinated against covid but did not receive a booster. She denies any sick contacts or any other sx's at this time.     In the ED pt temp 100 HR 90 RR 18 /58 SpO2 92% on RA. A rapid covid test was positive. CBC unremarkable, CMP w/ elevated BUN/Crt to 43/ 1.3 from baseline 20/0.9. LA initally 3.2 but down trended to 1.4 while in the ED. Troponin was elevated to 0.054 and up to 0.055 as well as BNP to 465. Procal wnl and UA neg. Pt was placed on 2L NC and 1L NS bolus as well as the first dose of remdesivir and dexamethasone. Pt was transferred [place] [facility] [place] further tx of her COVID infxn.        Overview/Hospital Course:  No notes on file    Interval History: Patient stating coughing has reduced this morning. Able to also tolerate PO intake. Patient denies being SOB or having chest pain.    Review of Systems   Constitutional:  Negative for activity change, appetite change, chills,  fatigue and fever.   HENT:  Positive for congestion and hearing loss (L ear hard of hearing for several years). Negative for rhinorrhea, sinus pressure, sinus pain, sneezing, sore throat and trouble swallowing.    Eyes:  Negative for photophobia and visual disturbance.   Respiratory:  Negative for apnea, cough, shortness of breath and wheezing.    Cardiovascular:  Negative for chest pain, palpitations and leg swelling.   Gastrointestinal:  Negative for abdominal distention, abdominal pain, blood in stool, constipation, diarrhea, nausea and vomiting.   Genitourinary:  Negative for dysuria, hematuria and urgency.   Musculoskeletal:  Positive for back pain (Chronic). Negative for joint swelling and myalgias.   Skin:  Negative for wound.   Neurological:  Negative for dizziness, tremors (Chronic facial twitching), facial asymmetry, weakness, light-headedness, numbness and headaches.   Psychiatric/Behavioral:  The patient is not nervous/anxious.    Objective:     Vital Signs (Most Recent):  Temp: 98.9 °F (37.2 °C) (05/29/22 2036)  Pulse: (!) 58 (05/29/22 2045)  Resp: 20 (05/29/22 2045)  BP: (!) 172/75 (05/29/22 2036)  SpO2: 97 % (05/29/22 2045)   Vital Signs (24h Range):  Temp:  [97.1 °F (36.2 °C)-99.6 °F (37.6 °C)] 98.9 °F (37.2 °C)  Pulse:  [58-81] 58  Resp:  [18-20] 20  SpO2:  [93 %-97 %] 97 %  BP: (134-172)/(64-75) 172/75     Weight: 82.9 kg (182 lb 12.2 oz)  Body mass index is 35.69 kg/m².    Intake/Output Summary (Last 24 hours) at 5/30/2022 0005  Last data filed at 5/29/2022 0907  Gross per 24 hour   Intake --   Output 300 ml   Net -300 ml      Physical Exam  Vitals and nursing note reviewed.   Constitutional:       General: She is not in acute distress.     Appearance: Normal appearance. She is not toxic-appearing.   HENT:      Head: Normocephalic and atraumatic.      Right Ear: External ear normal.      Left Ear: External ear normal.      Nose: Nose normal. No congestion.      Mouth/Throat:      Mouth: Mucous  membranes are moist.   Eyes:      Conjunctiva/sclera: Conjunctivae normal.      Pupils: Pupils are equal, round, and reactive to light.   Cardiovascular:      Rate and Rhythm: Normal rate and regular rhythm.      Pulses: Normal pulses.      Heart sounds: Normal heart sounds. No murmur heard.    No friction rub. No gallop.   Pulmonary:      Effort: Pulmonary effort is normal. No respiratory distress.      Breath sounds: Normal breath sounds. No wheezing, rhonchi or rales.      Comments: On 3 L NC  Chest:      Chest wall: No tenderness.   Abdominal:      General: Bowel sounds are normal.      Palpations: There is no mass.      Tenderness: There is no abdominal tenderness.   Musculoskeletal:         General: No swelling or tenderness. Normal range of motion.      Cervical back: Normal range of motion.      Right lower leg: No edema.      Left lower leg: No edema.   Lymphadenopathy:      Cervical: No cervical adenopathy.   Skin:     General: Skin is warm and dry.      Capillary Refill: Capillary refill takes less than 2 seconds.      Findings: No rash.   Neurological:      General: No focal deficit present.      Mental Status: She is alert and oriented to person, place, and time.      Cranial Nerves: No cranial nerve deficit.      Sensory: No sensory deficit.      Motor: No weakness.      Coordination: Coordination normal.      Comments: Pt with decreased hearing in L ear from previous shingles infxn as well as difficulty speaking and subjective R sided weakness residual from CVA       Significant Labs: All pertinent labs within the past 24 hours have been reviewed.  CBC:   Recent Labs   Lab 05/29/22  0439   WBC 5.64   HGB 9.8*   HCT 29.9*        CMP:   Recent Labs   Lab 05/29/22  0439      K 3.6      CO2 28   GLU 89   BUN 35*   CREATININE 1.0   CALCIUM 8.6*   PROT 6.7   ALBUMIN 2.7*   BILITOT 0.2   ALKPHOS 66   AST 20   ALT 14   ANIONGAP 10   EGFRNONAA 49*     POCT Glucose: No results for input(s):  POCTGLUCOSE in the last 48 hours.    Significant Imaging: I have reviewed all pertinent imaging results/findings within the past 24 hours.      Assessment/Plan:      * Pneumonia due to COVID-19 virus  Pt w/ a new worsening nonproductive cough for 1 wk PTA w/o relief from breathing tx at home  Pt denies any other sx's including SoB  Rapid COVID from ED positive  LA initially elevated but down trended w/ fluids  Procal wnl  ESR elevated to 84 as well as CRP to 52.4  CXR in ED with under expanded lungs and bibasilar patchy opacities  Pt requiring supplemental O2 via NC at admission and wears none at home  Given first doses of remdesivir and dexamethasone while still in ED    Plan:  Monitor O2 sat, maintain >90%, and adjust supplementation as necessary  Airborne, droplet, and contact precautions in place  Albuterol prn   Cnt remdesivir 100mg for 4 days ending 6/2 and Dexamethasone 6mg for 10 days ending 6/7  Vit C and multivitamin supplement         JUAN FRANCISCO (acute kidney injury)  See CKD 3      Elevated troponin  Troponin elevated while in ED to 0.054 then 0.055   Pt w/o any new EKG changes or complaints of CP    Plan:  Trend trop and EKG      CKD (chronic kidney disease), stage III  Pt noted to have CKD 3 w/ gfr 56 previous but 36 on admit   JUAN FRANCISCO on admit w. Elevated BUN/crt 43/1.3 from BL 20/0.9  Pt denies any urinary sx's or decreased urination    Plan:  Urine studies pending  Will hold nephrotoxic drugs and renally dose medications as necessary      History of CVA (cerebrovascular accident)  Pt had CVA w/ thorough Neuro F/U last seen 8/21 w/ rec to rtc in 1 yr  Pt denies any new neurological complaints or deficits at time of admit  Noted chronic difficulty speaking and subjective R sided weakness  Last Neuro notes new MRI with only old lacunar infarct changes   Per last Neuro recs cnt home asa and statin    Plan:  Cnt home asa and statin          Tremor  Cnt home primidone 50mg TID as well as metoprolol      Mild episode  of recurrent major depressive disorder  Cnt home Citalopram 10mg daily         Hyperlipidemia  Pt on simvastatin 40 qhs at home    Plan:  Will start equivalent atorvastatin 20 qhs while inpt      HTN (hypertension)  Pt compliant with home antihtn including amlodipine 2.5 qd, lisinopril 40mg qd, metoprolol succinate 2mg 24hr qd, and spironolactone-hydrochlorathiazide 25-25mg qd    Plan:  Cnt to monitor vitals  Cnt home amlodipine and metoprolol but will hold diuretics in setting of JUAN FRANCISCO  Will titrate medications as needed and add hydralazine prn for SBP >180        VTE Risk Mitigation (From admission, onward)         Ordered     enoxaparin injection 80 mg  Every 24 hours (non-standard times)         05/28/22 1159                Discharge Planning   SEBASTIÁN:      Code Status: Full Code   Is the patient medically ready for discharge?:     Reason for patient still in hospital (select all that apply): Treatment                     Stevie Oliver MD  Department of Hospital Medicine   [place] [facility]

## 2022-05-30 NOTE — PLAN OF CARE
CM sent SNF referrals via [facility].The state is closed today, unable to call in a PAS[facility] at this time.     [facility] Phone: [phone]    Response: Referral Received  Comments: we will review and look to see if we can take her once she is no longer positive. Waiting for recipient    [facility] Phone: [phone]    Waiting for recipient    [facility] Phone: [phone]    Waiting for recipient    [facility] Phone: [phone]    Waiting for recipient --Unable to accept patient       05/30/22 1416   Post-Acute Status   Post-Acute Authorization Placement   Post-Acute Placement Status Referrals Sent     Teresa John RN    [phone]

## 2022-05-30 NOTE — PT/OT/SLP EVAL
Physical Therapy Evaluation    Patient Name:  Alysia Ross   MRN:  [id number]    Recommendations:     Discharge Recommendations:  nursing facility, skilled   Discharge Equipment Recommendations:  (TBD)   Barriers to discharge: None if discharging to SNF    Assessment:     Alysia Ross is a 89 y.o. female admitted with a medical diagnosis of Pneumonia due to COVID-19 virus.  She presents with the following impairments/functional limitations:  weakness, impaired functional mobilty, gait instability, impaired endurance, impaired balance, impaired self care skills, decreased lower extremity function, decreased upper extremity function, decreased coordination, decreased safety awareness, pain, decreased ROM, impaired cardiopulmonary response to activity .Pt requiring Mod-MaxA x 1-2 for all mobility. Pt was able to side step R toward HOB ~ 5 ft with RW and ModA. Recommending SNF placement upon d/c.     Rehab Prognosis: Good; patient would benefit from acute skilled PT services to address these deficits and reach maximum level of function.    Recent Surgery: * No surgery found *      Plan:     During this hospitalization, patient to be seen 5 x/week to address the identified rehab impairments via gait training, therapeutic activities, therapeutic exercises, neuromuscular re-education and progress toward the following goals:    · Plan of Care Expires:  06/30/22    Subjective     Chief Complaint: back pain  Patient/Family Comments/goals: Return to PLOF  Pain/Comfort:  Pain Rating 1: 10/10  Location - Orientation 1: generalized  Location 1: back  Pain Addressed 1: Reposition, Distraction, Cessation of Activity  Pain Rating Post-Intervention 1: 10/10    Patients cultural, spiritual, Scientology conflicts given the current situation:      Living Environment:  obtained from DIL on phone: lives with son and DIL in [facility], ramp to enter, w/c accessible home, WIS with built in seat and hand held shower   Prior to admission,  patients level of function was DIL reports pt ambulates with rollator, assisted only with bathing; mod I/supervision for all other ADLs. Pt has access to BSC but does not use and sleeps in adjustable bed.  Equipment used at home: rollator, grab bar, bedside commode.  DME owned (not currently used): bedside commode.  Upon discharge, patient will have assistance from family with 24/7 care.    Objective:     Communicated with nsg prior to session.  Patient found HOB elevated with oxygen, peripheral IV, telemetry  upon PT entry to room.    General Precautions: Standard, fall, airborne, contact, droplet   Orthopedic Precautions:N/A   Braces: N/A  Respiratory Status: Nasal cannula, flow 3 L/min    Exams:  · Cognitive Exam:  Patient is oriented to Person, Place, Time and Situation; impaired memory, deaf in L ear  · Postural Exam:  Patient presented with the following abnormalities:    · -       Rounded shoulders  · -       Forward head  · -       Abnormal trunk flexion  · -       Kyphosis  · Sensation:    · -       Intact  light/touch BLE  · RLE ROM: WFL  · RLE Strength: 3+/5  · LLE ROM: WFL  · LLE Strength: 4-/5    Functional Mobility:  · Bed Mobility:     · Rolling Left:  moderate assistance  · Rolling Right: moderate assistance  · Scooting: maximal assistance scooting EOB  · Supine to Sit: moderate assistance, maximal assistance and of 2 persons  · Sit to Supine: moderate assistance, maximal assistance and of 2 persons  · Transfers:     · Sit to Stand:  moderate assistance, maximal assistance and of 2 persons with rolling walker  · Gait: Pt was able to side step R toward HOB ~ 5 ft with RW and ModA. VC's for upright posture 2/2 pt with increased forward flexion    Therapeutic Activities and Exercises:   Pt educated on role of PT/POC and importance of rolling to reposition for pressure relief.   Attempted 2 stands - pt unsuccessful during 1st attempt due to reports of needing to urinate.  Pt able to perform stand and  side steps as reported above.  Pt returned supine and performed rolling to change brief and for hygiene.  Pt scooted HOB with TotalA x 2 via drawsheet  Pt positioned into L side-lying with wedge in place.     AM-PAC 6 CLICK MOBILITY  Total Score:11     Patient left left sidelying with all lines intact, call button in reach, bed alarm on and nsg notified.    GOALS:   Multidisciplinary Problems     Physical Therapy Goals        Problem: Physical Therapy    Goal Priority Disciplines Outcome Goal Variances Interventions   Physical Therapy Goal     PT, PT/OT Ongoing, Progressing     Description: Goals to be met by: 22     Patient will increase functional independence with mobility by performin. Supine to sit with Minimal Assistance  2. Sit to supine with Minimal Assistance  3. Rolling to Left and Right with Minimal Assistance.  4. Sit to stand transfer with Minimal Assistance  5. Bed to chair transfer with Minimal Assistance using Rolling Walker  6. Gait  x 50 feet with Minimal Assistance using Rolling Walker.                      History:     Past Medical History:   Diagnosis Date    Anxiety     Depression     Hyperlipidemia     Hypertension     Pneumonia due to COVID-19 virus 2022    Stroke        Past Surgical History:   Procedure Laterality Date    APPENDECTOMY  1960    HYSTERECTOMY  1960s    KIDNEY STONE SURGERY         Time Tracking:     PT Received On: 22  PT Start Time: 1116     PT Stop Time: 1158  PT Total Time (min): 42 min     Billable Minutes: Evaluation 10 and Therapeutic Activity 19 (cotx with OT)      2022

## 2022-05-30 NOTE — PLAN OF CARE
cued into patient's room. Patient unable to hear CM. I called patient's son Christos listed on facesheet. I spoke with Christos and his wife. They stated patient lives with them. She does not have home health. She has a walker and nebulizer at home. She does not have oxygen at home. Patient does not drive, but her family transports to appointments. Therapy recommends SNF. Son stated he actually was on the phone with [facility] since they are closer to his home. I told son I will send patient's information to [facility] and other facilities near their home. Due to patient's Covid status, may be limited. I will update son once I speak with facilities. CM contacted [facility] and spoke with Lorrie in admissions. She stated her coworker did speak with son. She needs to see what their COVID protocol is and will contact CM back. Son encouraged to call with any questions or concerns.  will continue to follow patient through transitions of care and assist with any discharge needs.     7724--CM informed son have not heard back from [facility], but they may not be able to accept her if testing positive. CM will follow-up tomorrow with them. CM had son provide his email address, so CM could send him SNF list.     Son's email address: [email or web address]    Patient Contacts    Name Relation Home Work Mobile   chao fair   [phone]   Christos Ross Son [phone]  [phone]        05/30/22 1400   Discharge Assessment   Assessment Type Discharge Planning Assessment   Confirmed/corrected address, phone number and insurance Yes   Confirmed Demographics Correct on Facesheet   Source of Information family;health record   Lives With child(john), adult   Facility Arrived From: Home   Do you expect to return to your current living situation? No   Prior to hospitilization cognitive status: Unable to Assess   Current cognitive status: Unable to Assess   Walking or Climbing Stairs Difficulty  ambulation difficulty, requires equipment   Dressing/Bathing Difficulty none   Equipment Currently Used at Home walker, rolling;nebulizer   Do you take prescription medications? Yes   Do you have prescription coverage? Yes   Do you have any problems affording any of your prescribed medications? No   Is the patient taking medications as prescribed? yes   Who is going to help you get home at discharge? Eliezer Sher   How do you get to doctors appointments? family or friend will provide   Are you on dialysis? No   Do you take coumadin? No   Discharge Plan A Skilled Nursing Facility   Discharge Plan B Home with family;Home Health   DME Needed Upon Discharge  none   Discharge Plan discussed with: Adult children   Discharge Barriers Identified Other (see comments)  (COVID (+) 5/27)   Relationship/Environment   Name(s) of Who Lives With Patient [id number]     Teresa John RN    [phone]

## 2022-05-30 NOTE — ASSESSMENT & PLAN NOTE
Troponin elevated while in ED to 0.054 pk of 0.075  Pt w/o any new EKG changes or complaints of CP    Plan:  Trend trop and EKG

## 2022-05-30 NOTE — ASSESSMENT & PLAN NOTE
Pt compliant with home antihtn including amlodipine 2.5 qd, lisinopril 40mg qd, metoprolol succinate 2mg 24hr qd, and spironolactone-hydrochlorathiazide 25-25mg qd\  Increased home amlodipine to 5qd on 5/30    Plan:  Cnt to monitor vitals  Cnt amlodipine and metoprolol but will hold diuretics in setting of JUAN FRANCISCO  Will titrate medications as needed and add hydralazine prn for SBP >180

## 2022-05-30 NOTE — ASSESSMENT & PLAN NOTE
Pt noted to have CKD 3 w/ gfr 56 previous but 36 on admit   JUAN FRANCISCO on admit w. Elevated BUN/crt 43/1.3 from BL 20/0.9  Pt denies any urinary sx's or decreased urination  BUN/Crt to baseline 5/30    Plan:  Urine studies pending  Will hold nephrotoxic drugs and renally dose medications as necessary

## 2022-05-31 LAB
ALBUMIN SERPL BCP-MCNC: 2.6 G/DL (ref 3.5–5.2)
ALP SERPL-CCNC: 64 U/L (ref 55–135)
ALT SERPL W/O P-5'-P-CCNC: 16 U/L (ref 10–44)
ANION GAP SERPL CALC-SCNC: 10 MMOL/L (ref 8–16)
AST SERPL-CCNC: 22 U/L (ref 10–40)
BASOPHILS # BLD AUTO: 0.01 K/UL (ref 0–0.2)
BASOPHILS NFR BLD: 0.2 % (ref 0–1.9)
BILIRUB SERPL-MCNC: 0.4 MG/DL (ref 0.1–1)
BUN SERPL-MCNC: 23 MG/DL (ref 10–30)
CALCIUM SERPL-MCNC: 8.5 MG/DL (ref 8.7–10.5)
CHLORIDE SERPL-SCNC: 100 MMOL/L (ref 95–110)
CO2 SERPL-SCNC: 29 MMOL/L (ref 23–29)
CREAT SERPL-MCNC: 0.9 MG/DL (ref 0.5–1.4)
DIFFERENTIAL METHOD: ABNORMAL
EOSINOPHIL # BLD AUTO: 0.1 K/UL (ref 0–0.5)
EOSINOPHIL NFR BLD: 1.4 % (ref 0–8)
ERYTHROCYTE [DISTWIDTH] IN BLOOD BY AUTOMATED COUNT: 15.5 % (ref 11.5–14.5)
EST. GFR  (AFRICAN AMERICAN): >60 ML/MIN/1.73 M^2
EST. GFR  (NON AFRICAN AMERICAN): 56 ML/MIN/1.73 M^2
GLUCOSE SERPL-MCNC: 83 MG/DL (ref 70–110)
HCT VFR BLD AUTO: 31.9 % (ref 37–48.5)
HGB BLD-MCNC: 10.6 G/DL (ref 12–16)
IMM GRANULOCYTES # BLD AUTO: 0.02 K/UL (ref 0–0.04)
IMM GRANULOCYTES NFR BLD AUTO: 0.4 % (ref 0–0.5)
LYMPHOCYTES # BLD AUTO: 1.8 K/UL (ref 1–4.8)
LYMPHOCYTES NFR BLD: 30.6 % (ref 18–48)
MAGNESIUM SERPL-MCNC: 1.4 MG/DL (ref 1.6–2.6)
MCH RBC QN AUTO: 32.3 PG (ref 27–31)
MCHC RBC AUTO-ENTMCNC: 33.2 G/DL (ref 32–36)
MCV RBC AUTO: 97 FL (ref 82–98)
MONOCYTES # BLD AUTO: 0.7 K/UL (ref 0.3–1)
MONOCYTES NFR BLD: 12.4 % (ref 4–15)
NEUTROPHILS # BLD AUTO: 3.1 K/UL (ref 1.8–7.7)
NEUTROPHILS NFR BLD: 55 % (ref 38–73)
NRBC BLD-RTO: 0 /100 WBC
PHOSPHATE SERPL-MCNC: 2.3 MG/DL (ref 2.7–4.5)
PLATELET # BLD AUTO: 169 K/UL (ref 150–450)
PMV BLD AUTO: 9.2 FL (ref 9.2–12.9)
POTASSIUM SERPL-SCNC: 3.4 MMOL/L (ref 3.5–5.1)
PROT SERPL-MCNC: 6.6 G/DL (ref 6–8.4)
RBC # BLD AUTO: 3.28 M/UL (ref 4–5.4)
SODIUM SERPL-SCNC: 139 MMOL/L (ref 136–145)
WBC # BLD AUTO: 5.71 K/UL (ref 3.9–12.7)

## 2022-05-31 PROCEDURE — 85025 COMPLETE CBC W/AUTO DIFF WBC: CPT | Performed by: STUDENT IN AN ORGANIZED HEALTH CARE EDUCATION/TRAINING PROGRAM

## 2022-05-31 PROCEDURE — 63600175 PHARM REV CODE 636 W HCPCS: Performed by: FAMILY MEDICINE

## 2022-05-31 PROCEDURE — 36415 COLL VENOUS BLD VENIPUNCTURE: CPT | Performed by: STUDENT IN AN ORGANIZED HEALTH CARE EDUCATION/TRAINING PROGRAM

## 2022-05-31 PROCEDURE — 97116 GAIT TRAINING THERAPY: CPT | Mod: CQ

## 2022-05-31 PROCEDURE — 63600175 PHARM REV CODE 636 W HCPCS: Performed by: STUDENT IN AN ORGANIZED HEALTH CARE EDUCATION/TRAINING PROGRAM

## 2022-05-31 PROCEDURE — 27000207 HC ISOLATION

## 2022-05-31 PROCEDURE — 25000003 PHARM REV CODE 250: Performed by: STUDENT IN AN ORGANIZED HEALTH CARE EDUCATION/TRAINING PROGRAM

## 2022-05-31 PROCEDURE — 94799 UNLISTED PULMONARY SVC/PX: CPT

## 2022-05-31 PROCEDURE — 94640 AIRWAY INHALATION TREATMENT: CPT

## 2022-05-31 PROCEDURE — 83735 ASSAY OF MAGNESIUM: CPT | Performed by: STUDENT IN AN ORGANIZED HEALTH CARE EDUCATION/TRAINING PROGRAM

## 2022-05-31 PROCEDURE — 11000001 HC ACUTE MED/SURG PRIVATE ROOM

## 2022-05-31 PROCEDURE — 97110 THERAPEUTIC EXERCISES: CPT

## 2022-05-31 PROCEDURE — 94761 N-INVAS EAR/PLS OXIMETRY MLT: CPT

## 2022-05-31 PROCEDURE — 99900035 HC TECH TIME PER 15 MIN (STAT)

## 2022-05-31 PROCEDURE — 84100 ASSAY OF PHOSPHORUS: CPT | Performed by: STUDENT IN AN ORGANIZED HEALTH CARE EDUCATION/TRAINING PROGRAM

## 2022-05-31 PROCEDURE — 80053 COMPREHEN METABOLIC PANEL: CPT | Performed by: STUDENT IN AN ORGANIZED HEALTH CARE EDUCATION/TRAINING PROGRAM

## 2022-05-31 RX ORDER — ADHESIVE BANDAGE
30 BANDAGE TOPICAL DAILY PRN
Status: DISCONTINUED | OUTPATIENT
Start: 2022-05-31 | End: 2022-06-03 | Stop reason: HOSPADM

## 2022-05-31 RX ADMIN — PRIMIDONE 50 MG: 50 TABLET ORAL at 03:05

## 2022-05-31 RX ADMIN — OXYCODONE HYDROCHLORIDE AND ACETAMINOPHEN 500 MG: 500 TABLET ORAL at 09:05

## 2022-05-31 RX ADMIN — ATORVASTATIN CALCIUM 20 MG: 20 TABLET, FILM COATED ORAL at 08:05

## 2022-05-31 RX ADMIN — THERA TABS 1 TABLET: TAB at 08:05

## 2022-05-31 RX ADMIN — REMDESIVIR 100 MG: 100 INJECTION, POWDER, LYOPHILIZED, FOR SOLUTION INTRAVENOUS at 09:05

## 2022-05-31 RX ADMIN — PRIMIDONE 50 MG: 50 TABLET ORAL at 08:05

## 2022-05-31 RX ADMIN — OXYCODONE HYDROCHLORIDE AND ACETAMINOPHEN 500 MG: 500 TABLET ORAL at 08:05

## 2022-05-31 RX ADMIN — GUAIFENESIN AND DEXTROMETHORPHAN HYDROBROMIDE 1 TABLET: 600; 30 TABLET, EXTENDED RELEASE ORAL at 09:05

## 2022-05-31 RX ADMIN — DEXAMETHASONE 6 MG: 4 TABLET ORAL at 08:05

## 2022-05-31 RX ADMIN — ALBUTEROL SULFATE 2 PUFF: 90 AEROSOL, METERED RESPIRATORY (INHALATION) at 07:05

## 2022-05-31 RX ADMIN — AMLODIPINE BESYLATE 5 MG: 5 TABLET ORAL at 08:05

## 2022-05-31 RX ADMIN — ALBUTEROL SULFATE 2 PUFF: 90 AEROSOL, METERED RESPIRATORY (INHALATION) at 08:05

## 2022-05-31 RX ADMIN — ENOXAPARIN SODIUM 80 MG: 100 INJECTION SUBCUTANEOUS at 02:05

## 2022-05-31 RX ADMIN — METOPROLOL SUCCINATE 25 MG: 25 TABLET, EXTENDED RELEASE ORAL at 08:05

## 2022-05-31 RX ADMIN — GUAIFENESIN AND DEXTROMETHORPHAN HYDROBROMIDE 1 TABLET: 600; 30 TABLET, EXTENDED RELEASE ORAL at 08:05

## 2022-05-31 RX ADMIN — DOCUSATE SODIUM AND SENNOSIDES 1 TABLET: 8.6; 5 TABLET, FILM COATED ORAL at 10:05

## 2022-05-31 RX ADMIN — ALBUTEROL SULFATE 2 PUFF: 90 AEROSOL, METERED RESPIRATORY (INHALATION) at 12:05

## 2022-05-31 RX ADMIN — ALBUTEROL SULFATE 2 PUFF: 90 AEROSOL, METERED RESPIRATORY (INHALATION) at 01:05

## 2022-05-31 RX ADMIN — ACETAMINOPHEN 650 MG: 325 TABLET ORAL at 04:05

## 2022-05-31 RX ADMIN — CITALOPRAM HYDROBROMIDE 10 MG: 10 TABLET ORAL at 08:05

## 2022-05-31 RX ADMIN — PRIMIDONE 50 MG: 50 TABLET ORAL at 09:05

## 2022-05-31 NOTE — ASSESSMENT & PLAN NOTE
Pt compliant with home antihtn including amlodipine 2.5 qd, lisinopril 40mg qd, metoprolol succinate 2mg 24hr qd, and spironolactone-hydrochlorathiazide 25-25mg qd  Increased home amlodipine to 5qd on 5/30    Plan:  Cnt to monitor vitals  Cnt amlodipine and metoprolol but will hold diuretics in setting of JUAN FRANCISCO  Will titrate medications as needed and add hydralazine prn for SBP >180

## 2022-05-31 NOTE — RESIDENT HANDOFF
Handoff     Primary Team: Networked reference to record PCT  Room Number: K485/K485 A     Patient Name: Alysia Ross MRN: [id number]     Date of Birth: [id number] Allergies: Sulfa (sulfonamide antibiotics) and Penicillins     Age: 89 y.o. Admit Date: 5/28/2022     Sex: female  BMI: Body mass index is 35.69 kg/m².     Code Status: Full Code        Illness Level (current clinical status): Watcher - No    Reason for Admission: Pneumonia due to COVID-19 virus    Brief HPI (pertinent PMH and diagnosis or differential diagnosis): 89 yo F w/ PMHx of HTN, HLD, CKD3, MDD, remote hx CVA >15 yrs ago, and tremor who was transferred from [facility] for COVID infxn. Pt reports that for the past wk has had a worsening non-productive cough but denies any F, C, SoB, CP, HA, fatigue, muscle aches, N, V, D, or C. She had been hospitalized in 7/21 for bronchitis and given breathing tx and O2 at d/c; however, she was eventually weaned off from home O2. She tried taking left over breathing tx at home for her cough but did not notice any improvement in her sxs. She reports using a walker at home and lives with her son and his family and relies on them for most ADLs except going to the bathroom and changing. She was vaccinated against covid but did not receive a booster. She denies any sick contacts or any other sx's at this time.     Procedure Date: None    Hospital Course (updated, brief assessment by system or problem, significant events): Pt remained stable and tolerating COVID tx w/ Remdesivir (ending 6/1) and Dexamethasone (ending 6/7). She has been weaned from 3L on admit to 2L and will have walk test 5/31 to eval for O2 requirement. PT is evaluating pt and we are awaiting final recommendations, but Pt is amenable to SNF if that is decided.    Tasks (specific, using if-then statements):     F/u resp status   F/u PT recs   F/u placement       Estimated Discharge Date: 6/3    Discharge Disposition: Skilled Nursing  Facility    Mentored By: Dr. Capellan

## 2022-05-31 NOTE — PLAN OF CARE
reviewed [facility]. [facility] stated they could take her 10 days after her last major symptom. She does not need a negative test. [facility] and [facility] are unable to accept patient. I called patient's son to notify him no accepting SNF facilities yet. I asked if he reviewed SNF list I sent and he did. He did not provide CM with any other places he wanted CM to send. I told him I would  send patient's information to other facilities, but her COVID status is a barrier. Plus, they likely will have isolation protocols at these facilities. I asked him if he thought his mother could go home if unable to place. He told CM he was at the hospital now speaking with his mother. He informed CM the MD told him his mother was not ready today, however, maybe when she is ready he may be able to take her home if unable to place. I told him will follow-up and notify him if we have another facility looking at patient. Son encouraged to call with any questions or concerns.   will continue to follow patient through transitions of care and assist with any discharge needs.     I called Lorrie at [facility] to clarify their 10 days. She stated since patient is still being treated for COVID PNA her 10 days do not start yet.     1625--CM informed son we do not have an accepting SNF at this time. Reasons COVID status and unable to meet needs. I told him what [facility] stated regarding her admission since they really want her to go there (Her 10 days before admit have not started yet since still being treated). Son did visit today and I asked if he felt comfortable taking patient back home since she was living with him prior. Son stated he is just concerned he wouldn't be able to provide the care she needs. I told him sent IPR referrals as a back-up if unable to place in a SNF and cannot go home. So far, these facilities that are interested in patient are not near there home. I told son if  unable to go home, then we can see about getting placed in IPR (if unable to place SNF). CM will continue to follow. Will need to follow-up with son with decision.     Patient Contacts    Name Relation Home Work Mobile   chao fair   [phone]   Christos Ross Son [phone]        05/31/22 1627   Post-Acute Status   Post-Acute Authorization Placement  (IPR)   Post-Acute Placement Status Referrals Sent        05/31/22 1121   Discharge Reassessment   Assessment Type Discharge Planning Reassessment   Did the patient's condition or plan change since previous assessment? No   Discharge Plan discussed with: Adult children   Discharge Plan A Skilled Nursing Facility   Discharge Plan B Home with family;Home Health   DME Needed Upon Discharge  oxygen   Discharge Barriers Identified None   Why the patient remains in the hospital Requires continued medical care   Post-Acute Status   Post-Acute Authorization Placement   Post-Acute Placement Status Referrals Sent   Discharge Delays None known at this time     Teresa John RN    [phone]

## 2022-05-31 NOTE — PLAN OF CARE
attempted to complete LOCET on patient. CM was instructed would be called back at a later time to complete.    Phone#[phone]--Patient's LOCET completed. CM spoke with [place] at [facility]. [facility] faxed to [phone].    142 obtained and uploaded in [employer]. [facility] uploaded as well.     05/31/22 0910   Post-Acute Status   Post-Acute Placement Status Pending state direction/certification     Teresa John RN    [phone]

## 2022-05-31 NOTE — PLAN OF CARE
Problem: Physical Therapy  Goal: Physical Therapy Goal  Description: Goals to be met by: 22     Patient will increase functional independence with mobility by performin. Supine to sit with Minimal Assistance  2. Sit to supine with Minimal Assistance  3. Rolling to Left and Right with Minimal Assistance.  4. Sit to stand transfer with Minimal Assistance  5. Bed to chair transfer with Minimal Assistance using Rolling Walker  6. Gait  x 50 feet with Minimal Assistance using Rolling Walker.     Outcome: Ongoing, Progressing

## 2022-05-31 NOTE — ASSESSMENT & PLAN NOTE
Pt noted to have CKD 3 w/ gfr 56 previous but 36 on admit   JUAN FRANCISCO on admit w. Elevated BUN/crt 43/1.3 from BL 20/0.9  Pt denies any urinary sx's or decreased urination  BUN/Crt to baseline 5/30  Urine studies demonstrate pre-renal cause  JUAN FRANCISCO resolved 5/30    Plan:  Will hold nephrotoxic drugs and renally dose medications as necessary

## 2022-05-31 NOTE — SUBJECTIVE & OBJECTIVE
Interval History: NAEON. Pt doing well still on 2L NC w/o complaints this AM. She was able to work with PT yesterday but unfortunately did not have her walk test. She will f/u with PT today for final recs.     Review of Systems   Constitutional:  Negative for activity change, appetite change, chills, fatigue and fever.   HENT:  Positive for congestion and hearing loss (L ear hard of hearing for several years). Negative for rhinorrhea, sinus pressure, sinus pain, sneezing, sore throat and trouble swallowing.    Eyes:  Negative for photophobia and visual disturbance.   Respiratory:  Positive for cough. Negative for apnea, shortness of breath and wheezing.    Cardiovascular:  Negative for chest pain, palpitations and leg swelling.   Gastrointestinal:  Negative for abdominal distention, abdominal pain, blood in stool, constipation, diarrhea, nausea and vomiting.   Genitourinary:  Negative for dysuria, hematuria and urgency.   Musculoskeletal:  Positive for back pain (Chronic). Negative for joint swelling and myalgias.   Skin:  Negative for wound.   Neurological:  Positive for tremors (Chronic facial twitching). Negative for dizziness, facial asymmetry, weakness, light-headedness, numbness and headaches.   Psychiatric/Behavioral:  The patient is not nervous/anxious.    Objective:     Vital Signs (Most Recent):  Temp: 98.3 °F (36.8 °C) (05/31/22 0454)  Pulse: 63 (05/31/22 0715)  Resp: 16 (05/31/22 0715)  BP: 135/61 (05/31/22 0454)  SpO2: 96 % (05/31/22 0715) Vital Signs (24h Range):  Temp:  [98 °F (36.7 °C)-99.1 °F (37.3 °C)] 98.3 °F (36.8 °C)  Pulse:  [58-67] 63  Resp:  [16-20] 16  SpO2:  [91 %-97 %] 96 %  BP: (117-167)/(57-79) 135/61     Weight: 82.9 kg (182 lb 12.2 oz)  Body mass index is 35.69 kg/m².  No intake or output data in the 24 hours ending 05/31/22 0830   Physical Exam  Vitals and nursing note reviewed.   Constitutional:       General: She is not in acute distress.     Appearance: Normal appearance. She is  not toxic-appearing.   HENT:      Head: Normocephalic and atraumatic.      Right Ear: External ear normal.      Left Ear: External ear normal.      Nose: Nose normal. No congestion.      Mouth/Throat:      Mouth: Mucous membranes are moist.   Eyes:      Conjunctiva/sclera: Conjunctivae normal.      Pupils: Pupils are equal, round, and reactive to light.   Cardiovascular:      Rate and Rhythm: Normal rate and regular rhythm.      Pulses: Normal pulses.      Heart sounds: Normal heart sounds. No murmur heard.    No friction rub. No gallop.   Pulmonary:      Effort: Pulmonary effort is normal. No respiratory distress.      Breath sounds: Normal breath sounds. No wheezing, rhonchi or rales.      Comments: On 2 L NC  Chest:      Chest wall: No tenderness.   Abdominal:      General: Bowel sounds are normal.      Palpations: There is no mass.      Tenderness: There is no abdominal tenderness.   Musculoskeletal:         General: No swelling or tenderness. Normal range of motion.      Cervical back: Normal range of motion.      Right lower leg: No edema.      Left lower leg: No edema.   Lymphadenopathy:      Cervical: No cervical adenopathy.   Skin:     General: Skin is warm and dry.      Capillary Refill: Capillary refill takes less than 2 seconds.      Findings: No rash.   Neurological:      General: No focal deficit present.      Mental Status: She is alert and oriented to person, place, and time.      Cranial Nerves: No cranial nerve deficit.      Sensory: No sensory deficit.      Motor: No weakness.      Coordination: Coordination normal.      Comments: Pt with decreased hearing in L ear from previous shingles infxn as well as difficulty speaking and subjective R sided weakness residual from CVA     Recent Labs   Lab 05/29/22  0439 05/30/22  0813 05/31/22  0334   WBC 5.64 4.59 5.71   HGB 9.8* 10.7* 10.6*   HCT 29.9* 32.8* 31.9*   MCV 99* 99* 97   RBC 3.01* 3.32* 3.28*   MCH 32.6* 32.2* 32.3*   MCHC 32.8 32.6 33.2   RDW  16.3* 15.9* 15.5*    176 169   MPV 9.5 9.6 9.2   GRAN 62.4  3.5 57.3  2.6 55.0  3.1   LYMPH 21.1  1.2 25.3  1.2 30.6  1.8   MONO 14.5  0.8 15.3*  0.7 12.4  0.7   EOSINOPHIL 0.9 1.7 1.4   BASOPHIL 0.4 0.2 0.2     Recent Labs   Lab 05/29/22  0439 05/30/22  0813 05/31/22  0334    139 139   K 3.6 3.6 3.4*    101 100   CO2 28 27 29   ANIONGAP 10 11 10   BUN 35* 24 23   CREATININE 1.0 0.8 0.9   GLU 89 83 83   CALCIUM 8.6* 8.7 8.5*   PROT 6.7 6.4 6.6   ALBUMIN 2.7* 2.7* 2.6*   ALKPHOS 66 72 64   BILITOT 0.2 0.3 0.4   ALT 14 12 16   AST 20 19 22   ESTGFRAFRICA 57* >60 >60   EGFRNONAA 49* >60 56*   MG 1.5* 1.6 1.4*   PHOS 3.1 3.0 2.3*     Recent Labs   Lab 05/27/22  1448   COLORU Yellow   APPEARANCEUA Clear   PHUR 6.0   SPECGRAV 1.020   PROTEINUA Negative   GLUCUA Negative   KETONESU Negative   BILIRUBINUA Negative   OCCULTUA Negative   UROBILINOGEN Negative   NITRITE Negative   LEUKOCYTESUR Negative     Recent Labs   Lab 05/27/22  1438 05/27/22  1640 05/28/22  1232 05/28/22  1918   TROPONINI 0.054* 0.055* 0.075* 0.072*     Recent Labs   Lab 05/28/22  1233   INR 1.1   APTT 29.1     No results for input(s): TSH, [id number], C7TKTTU, THYROIDAB, FREET4 in the last 168 hours.  X-Ray Chest 1 View    Result Date: 5/27/2022  EXAMINATION: XR CHEST 1 VIEW CLINICAL HISTORY: shortness of breath; TECHNIQUE: Single frontal view of the chest was performed. COMPARISON: 03/15/2022 FINDINGS: The lungs are under expanded with crowding of the pulmonary vascularity.  There is bibasilar patchy opacities which could reflect atelectasis, aspiration or pneumonia.  Nodular opacity in the left hilar region not as clearly seen on today's study.  The heart is enlarged.  Calcified atheromatous disease affects the tortuous aorta.  Skeletal structures are intact.     As above. Electronically signed by: Inna Patterson MD Date:    05/27/2022 Time:    14:56    Microbiology Results (last 7 days)       ** No results found for the last  168 hours. **

## 2022-05-31 NOTE — NURSING
No significant events during this shift. Pt VSS and afebrile throughout shift. Pt free of skin breakdown. Pt has been eating and voiding adequately throughout shift. Pt has call light in reach, bed alarm on, bed brakes on, side rails up x2, bed in low position, SCDs on, IS at bedside, and nonskid socks on. Pt lying in bed in no distress. Purposeful rounding was preformed during this shift.

## 2022-05-31 NOTE — PROGRESS NOTES
[facility] Medicine  Progress Note    Patient Name: Alysia Ross  MRN: [id number]  Patient Class: IP- Inpatient   Admission Date: 5/28/2022  Length of Stay: 3 days  Attending Physician: Nadiya Capellan MD  Primary Care Provider: Rose Johnston NP        Subjective:     Principal Problem:Pneumonia due to COVID-19 virus        HPI:  89 yo F w/ PMHx of HTN, HLD, CKD3, MDD, remote hx CVA >15 yrs ago, and tremor who was transferred from [facility] for COVID infxn. Pt reports that for the past wk has had a worsening non-productive cough but denies any F, C, SoB, CP, HA, fatigue, muscle aches, N, V, D, or C. She had been hospitalized in 7/21 for bronchitis and given breathing tx and O2 at d/c; however, she was eventually weaned off from home O2. She tried taking left over breathing tx at home for her cough but did not notice any improvement in her sxs. She reports using a walker at home and lives with her son and his family and relies on them for most ADLs except going to the bathroom and changing. She was vaccinated against covid but did not receive a booster. She denies any sick contacts or any other sx's at this time.     In the ED pt temp 100 HR 90 RR 18 /58 SpO2 92% on RA. A rapid covid test was positive. CBC unremarkable, CMP w/ elevated BUN/Crt to 43/ 1.3 from baseline 20/0.9. LA initally 3.2 but down trended to 1.4 while in the ED. Troponin was elevated to 0.054 and up to 0.055 as well as BNP to 465. Procal wnl and UA neg. Pt was placed on 2L NC and 1L NS bolus as well as the first dose of remdesivir and dexamethasone. Pt was transferred to [facility] for further tx of her COVID infxn.        Overview/Hospital Course:  No notes on file    Interval History: NAEON. Pt doing well still on 2L NC w/o complaints this AM. She was able to work with PT yesterday but unfortunately did not have her walk test. She will f/u with PT today for final recs.     Review of Systems   Constitutional:   Negative for activity change, appetite change, chills, fatigue and fever.   HENT:  Positive for congestion and hearing loss (L ear hard of hearing for several years). Negative for rhinorrhea, sinus pressure, sinus pain, sneezing, sore throat and trouble swallowing.    Eyes:  Negative for photophobia and visual disturbance.   Respiratory:  Positive for cough. Negative for apnea, shortness of breath and wheezing.    Cardiovascular:  Negative for chest pain, palpitations and leg swelling.   Gastrointestinal:  Negative for abdominal distention, abdominal pain, blood in stool, constipation, diarrhea, nausea and vomiting.   Genitourinary:  Negative for dysuria, hematuria and urgency.   Musculoskeletal:  Positive for back pain (Chronic). Negative for joint swelling and myalgias.   Skin:  Negative for wound.   Neurological:  Positive for tremors (Chronic facial twitching). Negative for dizziness, facial asymmetry, weakness, light-headedness, numbness and headaches.   Psychiatric/Behavioral:  The patient is not nervous/anxious.    Objective:     Vital Signs (Most Recent):  Temp: 98.3 °F (36.8 °C) (05/31/22 0454)  Pulse: 63 (05/31/22 0715)  Resp: 16 (05/31/22 0715)  BP: 135/61 (05/31/22 0454)  SpO2: 96 % (05/31/22 0715) Vital Signs (24h Range):  Temp:  [98 °F (36.7 °C)-99.1 °F (37.3 °C)] 98.3 °F (36.8 °C)  Pulse:  [58-67] 63  Resp:  [16-20] 16  SpO2:  [91 %-97 %] 96 %  BP: (117-167)/(57-79) 135/61     Weight: 82.9 kg (182 lb 12.2 oz)  Body mass index is 35.69 kg/m².  No intake or output data in the 24 hours ending 05/31/22 0830   Physical Exam  Vitals and nursing note reviewed.   Constitutional:       General: She is not in acute distress.     Appearance: Normal appearance. She is not toxic-appearing.   HENT:      Head: Normocephalic and atraumatic.      Right Ear: External ear normal.      Left Ear: External ear normal.      Nose: Nose normal. No congestion.      Mouth/Throat:      Mouth: Mucous membranes are moist.   Eyes:       Conjunctiva/sclera: Conjunctivae normal.      Pupils: Pupils are equal, round, and reactive to light.   Cardiovascular:      Rate and Rhythm: Normal rate and regular rhythm.      Pulses: Normal pulses.      Heart sounds: Normal heart sounds. No murmur heard.    No friction rub. No gallop.   Pulmonary:      Effort: Pulmonary effort is normal. No respiratory distress.      Breath sounds: Normal breath sounds. No wheezing, rhonchi or rales.      Comments: On 2 L NC  Chest:      Chest wall: No tenderness.   Abdominal:      General: Bowel sounds are normal.      Palpations: There is no mass.      Tenderness: There is no abdominal tenderness.   Musculoskeletal:         General: No swelling or tenderness. Normal range of motion.      Cervical back: Normal range of motion.      Right lower leg: No edema.      Left lower leg: No edema.   Lymphadenopathy:      Cervical: No cervical adenopathy.   Skin:     General: Skin is warm and dry.      Capillary Refill: Capillary refill takes less than 2 seconds.      Findings: No rash.   Neurological:      General: No focal deficit present.      Mental Status: She is alert and oriented to person, place, and time.      Cranial Nerves: No cranial nerve deficit.      Sensory: No sensory deficit.      Motor: No weakness.      Coordination: Coordination normal.      Comments: Pt with decreased hearing in L ear from previous shingles infxn as well as difficulty speaking and subjective R sided weakness residual from CVA     Recent Labs   Lab 05/29/22  0439 05/30/22  0813 05/31/22  0334   WBC 5.64 4.59 5.71   HGB 9.8* 10.7* 10.6*   HCT 29.9* 32.8* 31.9*   MCV 99* 99* 97   RBC 3.01* 3.32* 3.28*   MCH 32.6* 32.2* 32.3*   MCHC 32.8 32.6 33.2   RDW 16.3* 15.9* 15.5*    176 169   MPV 9.5 9.6 9.2   GRAN 62.4  3.5 57.3  2.6 55.0  3.1   LYMPH 21.1  1.2 25.3  1.2 30.6  1.8   MONO 14.5  0.8 15.3*  0.7 12.4  0.7   EOSINOPHIL 0.9 1.7 1.4   BASOPHIL 0.4 0.2 0.2     Recent Labs   Lab  05/29/22  0439 05/30/22  0813 05/31/22  0334    139 139   K 3.6 3.6 3.4*    101 100   CO2 28 27 29   ANIONGAP 10 11 10   BUN 35* 24 23   CREATININE 1.0 0.8 0.9   GLU 89 83 83   CALCIUM 8.6* 8.7 8.5*   PROT 6.7 6.4 6.6   ALBUMIN 2.7* 2.7* 2.6*   ALKPHOS 66 72 64   BILITOT 0.2 0.3 0.4   ALT 14 12 16   AST 20 19 22   ESTGFRAFRICA 57* >60 >60   EGFRNONAA 49* >60 56*   MG 1.5* 1.6 1.4*   PHOS 3.1 3.0 2.3*     Recent Labs   Lab 05/27/22  1448   COLORU Yellow   APPEARANCEUA Clear   PHUR 6.0   SPECGRAV 1.020   PROTEINUA Negative   GLUCUA Negative   KETONESU Negative   BILIRUBINUA Negative   OCCULTUA Negative   UROBILINOGEN Negative   NITRITE Negative   LEUKOCYTESUR Negative     Recent Labs   Lab 05/27/22  1438 05/27/22  1640 05/28/22  1232 05/28/22  1918   TROPONINI 0.054* 0.055* 0.075* 0.072*     Recent Labs   Lab 05/28/22  1233   INR 1.1   APTT 29.1     No results for input(s): TSH, D8FWPQE, U3PUXLN, THYROIDAB, FREET4 in the last 168 hours.  X-Ray Chest 1 View    Result Date: 5/27/2022  EXAMINATION: XR CHEST 1 VIEW CLINICAL HISTORY: shortness of breath; TECHNIQUE: Single frontal view of the chest was performed. COMPARISON: 03/15/2022 FINDINGS: The lungs are under expanded with crowding of the pulmonary vascularity.  There is bibasilar patchy opacities which could reflect atelectasis, aspiration or pneumonia.  Nodular opacity in the left hilar region not as clearly seen on today's study.  The heart is enlarged.  Calcified atheromatous disease affects the tortuous aorta.  Skeletal structures are intact.     As above. Electronically signed by: Inna Patterson MD Date:    05/27/2022 Time:    14:56    Microbiology Results (last 7 days)       ** No results found for the last 168 hours. **                Assessment/Plan:      * Pneumonia due to COVID-19 virus  Pt w/ a new worsening nonproductive cough for 1 wk PTA w/o relief from breathing tx at home  Pt denies any other sx's including SoB  Rapid COVID from ED  positive  LA initially elevated but down trended w/ fluids  Procal wnl  ESR elevated to 84 as well as CRP to 52.4  CXR in ED with under expanded lungs and bibasilar patchy opacities  Pt requiring supplemental O2 via NC at admission and wears none at home  Given first doses of remdesivir and dexamethasone while still in ED    Plan:  Monitor O2 sat, maintain >90%, and adjust supplementation as necessary  Airborne, droplet, and contact precautions in place  Albuterol prn   Cnt remdesivir 100mg for 4 days ending 6/2 and Dexamethasone 6mg for 10 days ending 6/7  Vit C and multivitamin supplement   Incentive spirometry  PT to eval and tx         Elevated troponin  Troponin elevated while in ED to 0.054 pk of 0.075  Pt w/o any new EKG changes or complaints of CP    Plan:  Trend trop and EKG       CKD (chronic kidney disease), stage III  Pt noted to have CKD 3 w/ gfr 56 previous but 36 on admit   JUAN FRANCISCO on admit w. Elevated BUN/crt 43/1.3 from BL 20/0.9  Pt denies any urinary sx's or decreased urination  BUN/Crt to baseline 5/30  Urine studies demonstrate pre-renal cause  JUAN FRANCISCO resolved 5/30    Plan:  Will hold nephrotoxic drugs and renally dose medications as necessary      JUAN FRANCISCO (acute kidney injury)  See CKD 3      HTN (hypertension)  Pt compliant with home antihtn including amlodipine 2.5 qd, lisinopril 40mg qd, metoprolol succinate 2mg 24hr qd, and spironolactone-hydrochlorathiazide 25-25mg qd  Increased home amlodipine to 5qd on 5/30    Plan:  Cnt to monitor vitals  Cnt amlodipine and metoprolol but will hold diuretics in setting of JUAN FRANCISCO  Will titrate medications as needed and add hydralazine prn for SBP >180      History of CVA (cerebrovascular accident)  Pt had CVA w/ thorough Neuro F/U last seen 8/21 w/ rec to rtc in 1 yr  Pt denies any new neurological complaints or deficits at time of admit  Noted chronic difficulty speaking and subjective R sided weakness  Last Neuro notes new MRI with only old lacunar infarct changes    Per last Neuro recs cnt home asa and statin    Plan:  Cnt home asa and statin          Mild episode of recurrent major depressive disorder  Cnt home Citalopram 10mg daily         Hyperlipidemia  Pt on simvastatin 40 qhs at home    Plan:  Will start equivalent atorvastatin 20 qhs while inpt      Tremor  Cnt home primidone 50mg TID as well as metoprolol        VTE Risk Mitigation (From admission, onward)         Ordered     enoxaparin injection 80 mg  Every 24 hours (non-standard times)         05/28/22 1159                Discharge Planning   SEBASTIÁN:      Code Status: Full Code   Is the patient medically ready for discharge?:     Reason for patient still in hospital (select all that apply): Patient trending condition  Discharge Plan A: Skilled Nursing Facility        Forrest Eller MD  Department of Hospital Medicine   [place] [facility]

## 2022-05-31 NOTE — PT/OT/SLP PROGRESS
Occupational Therapy   Treatment    Name: Alysia Ross  MRN: [id number]  Admitting Diagnosis:  Pneumonia due to COVID-19 virus       Recommendations:     Discharge Recommendations: nursing facility, skilled  Discharge Equipment Recommendations:   (TBD)  Barriers to discharge:  None    Assessment:     Alysia Ross is a 89 y.o. female with a medical diagnosis of Pneumonia due to COVID-19 virus.  She presents with The primary encounter diagnosis was Hypoxia. Diagnoses of Pneumonia due to COVID-19 virus, COVID, and NSTEMI (non-ST elevated myocardial infarction) were also pertinent to this visit.  . Performance deficits affecting function are weakness, gait instability, impaired balance, impaired endurance, impaired self care skills, impaired functional mobilty, pain, impaired cardiopulmonary response to activity, decreased upper extremity function, decreased lower extremity function, decreased ROM, edema.       Pt progressing towards goals. Able to perform functional mobility this date. Continues to endorse back pain limiting tolerance. Cont OT POC     Rehab Prognosis:  Good; patient would benefit from acute skilled OT services to address these deficits and reach maximum level of function.       Plan:     Patient to be seen 5 x/week to address the above listed problems via self-care/home management, therapeutic activities, therapeutic exercises  · Plan of Care Expires: 06/30/22  · Plan of Care Reviewed with: patient, son    Subjective     Pain/Comfort:  · Pain Rating 1:  (did not rate)  · Location - Orientation 1: generalized  · Location 1: back  · Pain Addressed 1: Reposition, Distraction    Objective:     Communicated with: juan m prior to session.     General Precautions: Standard, airborne, contact, droplet, fall   Orthopedic Precautions:N/A   Braces: N/A       Occupational Performance:     Bed Mobility:    · Patient completed Scooting/Bridging with contact guard assistance  · Patient completed Supine to Sit  with minimum assistance  · Patient completed Sit to Supine with maximal assistance and 2 persons     Functional Mobility/Transfers:  · Patient completed Sit <> Stand Transfer with minimum assistance, moderate assistance and of 2 persons  with  rolling walker   · Patient completed Bed <> Chair Transfer using Step Transfer technique with minimum assistance with rolling walker  · Functional Mobility: Min a with RW x 2 trials     Activities of Daily Living:  · Feeding SBA      [facility] 6 Click ADL: 15    Treatment & Education:  Pt found supine; on 2Lo2 throughout session   Rest breaks required between axs 2/2 reports of SOB   Functional mobility x 2 trials with RW Min A  T/f to b/s chair   Performed UE/LE therex while seated with rest breaks   Increased assist required to stand from b/s chair/low surface       Patient left supine with all lines intact, call button in reach, bed alarm on, nsg notified and son presentEducation:      GOALS:   Multidisciplinary Problems     Occupational Therapy Goals        Problem: Occupational Therapy    Goal Priority Disciplines Outcome Interventions   Occupational Therapy Goal     OT, PT/OT Ongoing, Progressing    Description: Goals to be met by: 6/30/22     Patient will increase functional independence with ADLs by performing:    LE Dressing with Minimal Assistance.  Grooming while standing with Contact Guard Assistance.  Toileting from bedside commode with Contact Guard Assistance for hygiene and clothing management.   Supine to sit with Contact Guard Assistance.  Step transfer with Contact Guard Assistance  Toilet transfer to bedside commode with Contact Guard Assistance.  Increased functional strength to WFL for self care skills and functional mobility.  Upper extremity exercise program x10 reps per handout, with independence.                     Time Tracking:     OT Date of Treatment: 05/31/22  OT Start Time: 1435  OT Stop Time: 1504  OT Total Time (min): 29 min    Billable  Minutes:Therapeutic Exercise 13 co tx with PT     OT/MICA: OT     MICA Visit Number: 0    5/31/2022

## 2022-05-31 NOTE — PLAN OF CARE
Problem: Occupational Therapy  Goal: Occupational Therapy Goal  Description: Goals to be met by: 6/30/22     Patient will increase functional independence with ADLs by performing:    LE Dressing with Minimal Assistance.  Grooming while standing with Contact Guard Assistance.  Toileting from bedside commode with Contact Guard Assistance for hygiene and clothing management.   Supine to sit with Contact Guard Assistance.  Step transfer with Contact Guard Assistance  Toilet transfer to bedside commode with Contact Guard Assistance.  Increased functional strength to WFL for self care skills and functional mobility.  Upper extremity exercise program x10 reps per handout, with independence.    Outcome: Ongoing, Progressing     Pt progressing towards goals. Able to perform functional mobility this date. Continues to endorse back pain limiting tolerance. Cont OT POC

## 2022-05-31 NOTE — PT/OT/SLP PROGRESS
Physical Therapy Treatment    Patient Name:  Alysia Ross   MRN:  [id number]    Recommendations:     Discharge Recommendations:  nursing facility, skilled   Discharge Equipment Recommendations:  (TBD)   Barriers to discharge: decreased mobility,strength and endurance    Assessment:     Alysia Ross is a 89 y.o. female admitted with a medical diagnosis of Pneumonia due to COVID-19 virus.  She presents with the following impairments/functional limitations:  weakness, impaired endurance, impaired functional mobilty, gait instability, impaired balance, decreased upper extremity function, decreased lower extremity function, decreased safety awareness, pain, decreased ROM, impaired coordination, impaired cardiopulmonary response to activity,pt with good participation and requires assistance with all mobility at this time,pt will benefit from SNF upon discharge.    Rehab Prognosis: Fair; patient would benefit from acute skilled PT services to address these deficits and reach maximum level of function.    Recent Surgery: * No surgery found *      Plan:     During this hospitalization, patient to be seen 5 x/week to address the identified rehab impairments via gait training, therapeutic activities, therapeutic exercises, neuromuscular re-education and progress toward the following goals:    · Plan of Care Expires:  06/30/22    Subjective     Chief Complaint: n/a  Patient/Family Comments/goals: pt always has back pain.  Pain/Comfort:  · Pain Rating 1:  (no rating)  · Location - Orientation 1: generalized  · Location 1: back  · Pain Addressed 1: Reposition, Distraction      Objective:     Communicated with nsg prior to session.  Patient found supine with bed alarm, oxygen, peripheral IV, telemetry upon PT entry to room.     General Precautions: Standard, airborne, contact, droplet, fall   Orthopedic Precautions:N/A   Braces: N/A  Respiratory Status: Nasal cannula, flow 2 L/min     Functional Mobility:  · Bed  Mobility:     · Supine to Sit: minimum assistance  · Sit to Supine: maximal assistance and of 2 persons  · Transfers:     · Sit to Stand:  minimum assistance, moderate assistance, of 2 persons and X 2 trials with rolling walker  · Bed to Chair: minimum assistance with  rolling walker  using  ambulation  · Gait: amb ~10' X 2 with RW and Min A with O2/2L  · Balance: fair standing balance with RW      AM-PAC 6 CLICK MOBILITY  Turning over in bed (including adjusting bedclothes, sheets and blankets)?: 2  Sitting down on and standing up from a chair with arms (e.g., wheelchair, bedside commode, etc.): 2  Moving from lying on back to sitting on the side of the bed?: 3  Moving to and from a bed to a chair (including a wheelchair)?: 2  Need to walk in hospital room?: 3  Climbing 3-5 steps with a railing?: 1  Basic Mobility Total Score: 13       Therapeutic Activities and Exercises: le supine ex's X 10-12 reps inc: laq's,ap and hip flex,ue ex's with OT.       Patient left supine with all lines intact, call button in reach, bed alarm on and nsg notified..    GOALS: see general POC  Multidisciplinary Problems     Physical Therapy Goals        Problem: Physical Therapy    Goal Priority Disciplines Outcome Goal Variances Interventions   Physical Therapy Goal     PT, PT/OT Ongoing, Progressing     Description: Goals to be met by: 22     Patient will increase functional independence with mobility by performin. Supine to sit with Minimal Assistance  2. Sit to supine with Minimal Assistance  3. Rolling to Left and Right with Minimal Assistance.  4. Sit to stand transfer with Minimal Assistance  5. Bed to chair transfer with Minimal Assistance using Rolling Walker  6. Gait  x 50 feet with Minimal Assistance using Rolling Walker.                      Time Tracking:     PT Received On: 22  PT Start Time: 1430     PT Stop Time: 1504  PT Total Time (min): 34 min     Billable Minutes: Gait Training 17 and Total Time  34       PT/PTA: PTA     PTA Visit Number: 1     05/31/2022

## 2022-06-01 LAB
ALBUMIN SERPL BCP-MCNC: 2.7 G/DL (ref 3.5–5.2)
ALP SERPL-CCNC: 76 U/L (ref 55–135)
ALT SERPL W/O P-5'-P-CCNC: 13 U/L (ref 10–44)
ANION GAP SERPL CALC-SCNC: 10 MMOL/L (ref 8–16)
AST SERPL-CCNC: 20 U/L (ref 10–40)
BACTERIA BLD CULT: NORMAL
BACTERIA BLD CULT: NORMAL
BASOPHILS # BLD AUTO: 0.02 K/UL (ref 0–0.2)
BASOPHILS NFR BLD: 0.3 % (ref 0–1.9)
BILIRUB SERPL-MCNC: 0.4 MG/DL (ref 0.1–1)
BUN SERPL-MCNC: 23 MG/DL (ref 10–30)
CALCIUM SERPL-MCNC: 8.4 MG/DL (ref 8.7–10.5)
CHLORIDE SERPL-SCNC: 102 MMOL/L (ref 95–110)
CO2 SERPL-SCNC: 28 MMOL/L (ref 23–29)
CREAT SERPL-MCNC: 0.8 MG/DL (ref 0.5–1.4)
DIFFERENTIAL METHOD: ABNORMAL
EOSINOPHIL # BLD AUTO: 0.1 K/UL (ref 0–0.5)
EOSINOPHIL NFR BLD: 1.4 % (ref 0–8)
ERYTHROCYTE [DISTWIDTH] IN BLOOD BY AUTOMATED COUNT: 15.3 % (ref 11.5–14.5)
EST. GFR  (AFRICAN AMERICAN): >60 ML/MIN/1.73 M^2
EST. GFR  (NON AFRICAN AMERICAN): >60 ML/MIN/1.73 M^2
GLUCOSE SERPL-MCNC: 93 MG/DL (ref 70–110)
HCT VFR BLD AUTO: 32.5 % (ref 37–48.5)
HGB BLD-MCNC: 10.6 G/DL (ref 12–16)
IMM GRANULOCYTES # BLD AUTO: 0.02 K/UL (ref 0–0.04)
IMM GRANULOCYTES NFR BLD AUTO: 0.3 % (ref 0–0.5)
LYMPHOCYTES # BLD AUTO: 1.9 K/UL (ref 1–4.8)
LYMPHOCYTES NFR BLD: 33.3 % (ref 18–48)
MAGNESIUM SERPL-MCNC: 1.4 MG/DL (ref 1.6–2.6)
MCH RBC QN AUTO: 31.7 PG (ref 27–31)
MCHC RBC AUTO-ENTMCNC: 32.6 G/DL (ref 32–36)
MCV RBC AUTO: 97 FL (ref 82–98)
MONOCYTES # BLD AUTO: 0.8 K/UL (ref 0.3–1)
MONOCYTES NFR BLD: 14.3 % (ref 4–15)
NEUTROPHILS # BLD AUTO: 2.9 K/UL (ref 1.8–7.7)
NEUTROPHILS NFR BLD: 50.4 % (ref 38–73)
NRBC BLD-RTO: 0 /100 WBC
PHOSPHATE SERPL-MCNC: 2.3 MG/DL (ref 2.7–4.5)
PLATELET # BLD AUTO: 188 K/UL (ref 150–450)
PMV BLD AUTO: 9.8 FL (ref 9.2–12.9)
POTASSIUM SERPL-SCNC: 3.4 MMOL/L (ref 3.5–5.1)
PROT SERPL-MCNC: 6.3 G/DL (ref 6–8.4)
RBC # BLD AUTO: 3.34 M/UL (ref 4–5.4)
SODIUM SERPL-SCNC: 140 MMOL/L (ref 136–145)
WBC # BLD AUTO: 5.74 K/UL (ref 3.9–12.7)

## 2022-06-01 PROCEDURE — 63600175 PHARM REV CODE 636 W HCPCS: Performed by: FAMILY MEDICINE

## 2022-06-01 PROCEDURE — 84100 ASSAY OF PHOSPHORUS: CPT | Performed by: STUDENT IN AN ORGANIZED HEALTH CARE EDUCATION/TRAINING PROGRAM

## 2022-06-01 PROCEDURE — 25000003 PHARM REV CODE 250: Performed by: STUDENT IN AN ORGANIZED HEALTH CARE EDUCATION/TRAINING PROGRAM

## 2022-06-01 PROCEDURE — 80053 COMPREHEN METABOLIC PANEL: CPT | Performed by: STUDENT IN AN ORGANIZED HEALTH CARE EDUCATION/TRAINING PROGRAM

## 2022-06-01 PROCEDURE — 85025 COMPLETE CBC W/AUTO DIFF WBC: CPT | Performed by: STUDENT IN AN ORGANIZED HEALTH CARE EDUCATION/TRAINING PROGRAM

## 2022-06-01 PROCEDURE — 99900035 HC TECH TIME PER 15 MIN (STAT)

## 2022-06-01 PROCEDURE — 97535 SELF CARE MNGMENT TRAINING: CPT

## 2022-06-01 PROCEDURE — 27000221 HC OXYGEN, UP TO 24 HOURS

## 2022-06-01 PROCEDURE — 36415 COLL VENOUS BLD VENIPUNCTURE: CPT | Performed by: STUDENT IN AN ORGANIZED HEALTH CARE EDUCATION/TRAINING PROGRAM

## 2022-06-01 PROCEDURE — 83735 ASSAY OF MAGNESIUM: CPT | Performed by: STUDENT IN AN ORGANIZED HEALTH CARE EDUCATION/TRAINING PROGRAM

## 2022-06-01 PROCEDURE — 25000003 PHARM REV CODE 250

## 2022-06-01 PROCEDURE — 94761 N-INVAS EAR/PLS OXIMETRY MLT: CPT

## 2022-06-01 PROCEDURE — 97110 THERAPEUTIC EXERCISES: CPT

## 2022-06-01 PROCEDURE — 94640 AIRWAY INHALATION TREATMENT: CPT

## 2022-06-01 PROCEDURE — 11000001 HC ACUTE MED/SURG PRIVATE ROOM

## 2022-06-01 PROCEDURE — 63600175 PHARM REV CODE 636 W HCPCS: Performed by: STUDENT IN AN ORGANIZED HEALTH CARE EDUCATION/TRAINING PROGRAM

## 2022-06-01 PROCEDURE — 94799 UNLISTED PULMONARY SVC/PX: CPT

## 2022-06-01 PROCEDURE — 97116 GAIT TRAINING THERAPY: CPT | Mod: CQ

## 2022-06-01 PROCEDURE — 27000207 HC ISOLATION

## 2022-06-01 RX ORDER — LANOLIN ALCOHOL/MO/W.PET/CERES
400 CREAM (GRAM) TOPICAL ONCE
Status: COMPLETED | OUTPATIENT
Start: 2022-06-01 | End: 2022-06-01

## 2022-06-01 RX ORDER — SODIUM,POTASSIUM PHOSPHATES 280-250MG
1 POWDER IN PACKET (EA) ORAL EVERY 6 HOURS
Status: COMPLETED | OUTPATIENT
Start: 2022-06-01 | End: 2022-06-01

## 2022-06-01 RX ADMIN — Medication 400 MG: at 06:06

## 2022-06-01 RX ADMIN — ALBUTEROL SULFATE 2 PUFF: 90 AEROSOL, METERED RESPIRATORY (INHALATION) at 07:06

## 2022-06-01 RX ADMIN — OXYCODONE HYDROCHLORIDE AND ACETAMINOPHEN 500 MG: 500 TABLET ORAL at 09:06

## 2022-06-01 RX ADMIN — ENOXAPARIN SODIUM 80 MG: 100 INJECTION SUBCUTANEOUS at 03:06

## 2022-06-01 RX ADMIN — THERA TABS 1 TABLET: TAB at 09:06

## 2022-06-01 RX ADMIN — GUAIFENESIN AND DEXTROMETHORPHAN HYDROBROMIDE 1 TABLET: 600; 30 TABLET, EXTENDED RELEASE ORAL at 08:06

## 2022-06-01 RX ADMIN — PRIMIDONE 50 MG: 50 TABLET ORAL at 03:06

## 2022-06-01 RX ADMIN — DEXAMETHASONE 6 MG: 4 TABLET ORAL at 09:06

## 2022-06-01 RX ADMIN — PRIMIDONE 50 MG: 50 TABLET ORAL at 08:06

## 2022-06-01 RX ADMIN — GUAIFENESIN AND DEXTROMETHORPHAN HYDROBROMIDE 1 TABLET: 600; 30 TABLET, EXTENDED RELEASE ORAL at 09:06

## 2022-06-01 RX ADMIN — ALBUTEROL SULFATE 2 PUFF: 90 AEROSOL, METERED RESPIRATORY (INHALATION) at 12:06

## 2022-06-01 RX ADMIN — POTASSIUM & SODIUM PHOSPHATES POWDER PACK 280-160-250 MG 1 PACKET: 280-160-250 PACK at 06:06

## 2022-06-01 RX ADMIN — PRIMIDONE 50 MG: 50 TABLET ORAL at 09:06

## 2022-06-01 RX ADMIN — METOPROLOL SUCCINATE 25 MG: 25 TABLET, EXTENDED RELEASE ORAL at 09:06

## 2022-06-01 RX ADMIN — POTASSIUM & SODIUM PHOSPHATES POWDER PACK 280-160-250 MG 1 PACKET: 280-160-250 PACK at 11:06

## 2022-06-01 RX ADMIN — ALBUTEROL SULFATE 2 PUFF: 90 AEROSOL, METERED RESPIRATORY (INHALATION) at 01:06

## 2022-06-01 RX ADMIN — OXYCODONE HYDROCHLORIDE AND ACETAMINOPHEN 500 MG: 500 TABLET ORAL at 08:06

## 2022-06-01 RX ADMIN — CITALOPRAM HYDROBROMIDE 10 MG: 10 TABLET ORAL at 09:06

## 2022-06-01 RX ADMIN — ATORVASTATIN CALCIUM 20 MG: 20 TABLET, FILM COATED ORAL at 09:06

## 2022-06-01 RX ADMIN — MAGNESIUM HYDROXIDE 2400 MG: 400 SUSPENSION ORAL at 06:06

## 2022-06-01 RX ADMIN — AMLODIPINE BESYLATE 5 MG: 5 TABLET ORAL at 09:06

## 2022-06-01 NOTE — NURSING
6 min walk test done by primary nurse . Pt O2 stat began at 94% on room air . Pt up with walker pt drop to 90% at 3 mins . Pt finished at 93% on room air . Pt tolerated well . Pt back in bed , call light and belongings in reach . Safety/ fall precautions in place.

## 2022-06-01 NOTE — PLAN OF CARE
Joseph spoke with Sivan with [facility] in [facility]. Per Sivan pt has been accepted for [facility] in [facility]. Sivan stated they could admit pt tomorrow on 6-2-2022.     Joseph spoke with Shireen with [facility]. Per Shireen, pt is still under review with [facility]. Shireen will update Joseph either way if pt is accepted or denied for [facility].     Joseph spoke with pt's son Christos to discuss d/c planning. Joseph informed Christos that pt has been accepted at [facility] in [facility]. Christos stated that he spoke with Sivan over at [facility] this morning. Christos requested time to think about it if he is agreeable or not for pt to transfer to [facility]. Joseph encouraged Christos to think about his decision on I[facility] in a timely manner. Christos stated he would prefer pt to transfer to [facility]. Joseph informed Christos that pt is still under review for [facility]. Joseph encouraged Christos to call with any questions or concerns.     Joseph will continue to follow pt for d/c planning.        06/01/22 1406   Post-Acute Status   Post-Acute Authorization Placement   Discharge Plan   Discharge Plan A Rehab;Skilled Nursing Facility

## 2022-06-01 NOTE — HOSPITAL COURSE
Patient remained stable and tolerated COVID treatment with complete five-day course of Remdesivir and six days of Dexamethasone per protocol. Patient weaned from 3L on admit to room air. On 6/1, patient maintained O2 sat >90% on room air for duration of ambulatory O2 test. Patient worked with PT who deemed her a good candidate for inpatient rehab and patient expressed equal interest in said placement. Patient accepted to [facility] inpatient rehab, discharged on 6/2 feeling well, in good condition, vitals stable on room air.

## 2022-06-01 NOTE — PT/OT/SLP PROGRESS
Occupational Therapy   Treatment    Name: Alysia Ross  MRN: [id number]  Admitting Diagnosis:  Pneumonia due to COVID-19 virus       Recommendations:     Discharge Recommendations: nursing facility, skilled  Discharge Equipment Recommendations:  walker, rolling  Barriers to discharge:  Decreased caregiver support    Assessment:     Alysia Ross is a 89 y.o. female with a medical diagnosis of Pneumonia due to COVID-19 virus.  She presents with The primary encounter diagnosis was Hypoxia. Diagnoses of Pneumonia due to COVID-19 virus, COVID, and NSTEMI (non-ST elevated myocardial infarction) were also pertinent to this visit.  . Performance deficits affecting function are weakness, gait instability, impaired balance, impaired endurance, impaired self care skills, impaired functional mobilty, impaired cardiopulmonary response to activity, decreased safety awareness, edema.     Pt progressing towards goals. Reports feeling better this date. Improvement noted in standing trials and functional mobility. Continues to require increased assist for ADLs. Cont OT POC     Rehab Prognosis:  Good; patient would benefit from acute skilled OT services to address these deficits and reach maximum level of function.       Plan:     Patient to be seen 5 x/week to address the above listed problems via self-care/home management, therapeutic exercises, therapeutic activities  · Plan of Care Expires: 06/30/22  · Plan of Care Reviewed with: patient    Subjective     Pain/Comfort:  · Pain Rating 1: 0/10    Objective:     Communicated with: juan m prior to session.      General Precautions: Standard, airborne, fall, contact, droplet   Orthopedic Precautions:N/A   Braces: N/A     Occupational Performance:     Bed Mobility:    · Patient completed Scooting/Bridging with stand by assistance  · Patient completed Supine to Sit with stand by assistance  · Patient completed Sit to Supine with maximal assistance and 2 persons     Functional  Mobility/Transfers:  · Patient completed Sit <> Stand Transfer with minimum assistance  with  rolling walker   · Patient completed Bed <> Chair Transfer using Step Transfer technique with minimum assistance with rolling walker  · Functional Mobility: Min A -CGA with RW     Activities of Daily Living:  · Grooming: stand by assistance seated EOB  · Toileting: total assistance        AMPAC 6 Click ADL: 15    Treatment & Education:  Pt reports feeling better this AM   Increased time required for bed mobility, however, able to perform with decreased assist for sup>sit   Decreased assist required for standing trials from seated surfaces x 3 with RW; cues for seated scooting to edge of surface to ease transitions   T/f to b/s chair   Stood to perform UE/LE therex with seated rest break required   Soiled in urine; total A for toileting     Patient left supine with all lines intact, call button in reach, bed alarm on and nsg notifiedEducation:      GOALS:   Multidisciplinary Problems     Occupational Therapy Goals        Problem: Occupational Therapy    Goal Priority Disciplines Outcome Interventions   Occupational Therapy Goal     OT, PT/OT Ongoing, Progressing    Description: Goals to be met by: 6/30/22     Patient will increase functional independence with ADLs by performing:    LE Dressing with Minimal Assistance.  Grooming while standing with Contact Guard Assistance.  Toileting from bedside commode with Contact Guard Assistance for hygiene and clothing management.   Supine to sit with Contact Guard Assistance.  Step transfer with Contact Guard Assistance  Toilet transfer to bedside commode with Contact Guard Assistance.  Increased functional strength to WFL for self care skills and functional mobility.  Upper extremity exercise program x10 reps per handout, with independence.                     Time Tracking:     OT Date of Treatment: 06/01/22  OT Start Time: 1050  OT Stop Time: 1140  OT Total Time (min): 50  min    Billable Minutes:Self Care/Home Management 23 co tx with PT     OT/MICA: OT     MICA Visit Number: 0    6/1/2022

## 2022-06-01 NOTE — PLAN OF CARE
Problem: Occupational Therapy  Goal: Occupational Therapy Goal  Description: Goals to be met by: 6/30/22     Patient will increase functional independence with ADLs by performing:    LE Dressing with Minimal Assistance.  Grooming while standing with Contact Guard Assistance.  Toileting from bedside commode with Contact Guard Assistance for hygiene and clothing management.   Supine to sit with Contact Guard Assistance.  Step transfer with Contact Guard Assistance  Toilet transfer to bedside commode with Contact Guard Assistance.  Increased functional strength to WFL for self care skills and functional mobility.  Upper extremity exercise program x10 reps per handout, with independence.    Outcome: Ongoing, Progressing     Pt progressing towards goals. Reports feeling better this date. Improvement noted in standing trials and functional mobility. Continues to require increased assist for ADLs. Cont OT POC

## 2022-06-01 NOTE — PROGRESS NOTES
[facility]  Progress Note    Patient Name: Alysia Ross  MRN: [id number]  Patient Class: IP- Inpatient   Admission Date: 5/28/2022  Length of Stay: 4 days  Attending Physician: Nadiya Capellan MD  Primary Care Provider: Rose Johnston NP        Subjective:     Principal Problem:Pneumonia due to COVID-19 virus        HPI:  89 yo F w/ PMHx of HTN, HLD, CKD3, MDD, remote hx CVA >15 yrs ago, and tremor who was transferred from [facility] for COVID infxn. Pt reports that for the past wk has had a worsening non-productive cough but denies any F, C, SoB, CP, HA, fatigue, muscle aches, N, V, D, or C. She had been hospitalized in 7/21 for bronchitis and given breathing tx and O2 at d/c; however, she was eventually weaned off from home O2. She tried taking left over breathing tx at home for her cough but did not notice any improvement in her sxs. She reports using a walker at home and lives with her son and his family and relies on them for most ADLs except going to the bathroom and changing. She was vaccinated against covid but did not receive a booster. She denies any sick contacts or any other sx's at this time.     In the ED pt temp 100 HR 90 RR 18 /58 SpO2 92% on RA. A rapid covid test was positive. CBC unremarkable, CMP w/ elevated BUN/Crt to 43/ 1.3 from baseline 20/0.9. LA initally 3.2 but down trended to 1.4 while in the ED. Troponin was elevated to 0.054 and up to 0.055 as well as BNP to 465. Procal wnl and UA neg. Pt was placed on 2L NC and 1L NS bolus as well as the first dose of remdesivir and dexamethasone. Pt was transferred to [facility] for further tx of her COVID infxn.        Overview/Hospital Course:  No notes on file    Interval History: NAEON. Pt doing well still on 2L NC w/o complaints this AM, eating and drinking well, still without bowel movement since admission. Working PT, set to have amb O2 today.    Review of Systems   Constitutional:  Negative for  activity change, appetite change, chills, fatigue and fever.   HENT:  Positive for congestion and hearing loss (L ear hard of hearing for several years). Negative for rhinorrhea, sinus pressure, sinus pain, sneezing, sore throat and trouble swallowing.    Eyes:  Negative for photophobia and visual disturbance.   Respiratory:  Positive for cough. Negative for apnea, shortness of breath and wheezing.    Cardiovascular:  Negative for chest pain, palpitations and leg swelling.   Gastrointestinal:  Negative for abdominal distention, abdominal pain, blood in stool, constipation, diarrhea, nausea and vomiting.   Genitourinary:  Negative for dysuria, hematuria and urgency.   Musculoskeletal:  Positive for back pain (Chronic). Negative for joint swelling and myalgias.   Skin:  Negative for wound.   Neurological:  Positive for tremors (Chronic facial twitching). Negative for dizziness, facial asymmetry, weakness, light-headedness, numbness and headaches.   Psychiatric/Behavioral:  The patient is not nervous/anxious.    Objective:     Vital Signs (Most Recent):  Temp: 97.7 °F (36.5 °C) (06/01/22 0523)  Pulse: 61 (06/01/22 0757)  Resp: 20 (06/01/22 0757)  BP: (!) 157/74 (06/01/22 0523)  SpO2: 97 % (06/01/22 0757) Vital Signs (24h Range):  Temp:  [97.3 °F (36.3 °C)-98.8 °F (37.1 °C)] 97.7 °F (36.5 °C)  Pulse:  [60-78] 61  Resp:  [18-20] 20  SpO2:  [92 %-97 %] 97 %  BP: (124-161)/(60-77) 157/74     Weight: 82.9 kg (182 lb 12.2 oz)  Body mass index is 35.69 kg/m².    Intake/Output Summary (Last 24 hours) at 6/1/2022 0840  Last data filed at 6/1/2022 0700  Gross per 24 hour   Intake --   Output 800 ml   Net -800 ml        Physical Exam  Vitals and nursing note reviewed.   Constitutional:       General: She is not in acute distress.     Appearance: Normal appearance. She is not toxic-appearing.   HENT:      Head: Normocephalic and atraumatic.      Right Ear: External ear normal.      Left Ear: External ear normal.      Nose: Nose  normal. No congestion.      Mouth/Throat:      Mouth: Mucous membranes are moist.   Eyes:      Conjunctiva/sclera: Conjunctivae normal.      Pupils: Pupils are equal, round, and reactive to light.   Cardiovascular:      Rate and Rhythm: Normal rate and regular rhythm.      Pulses: Normal pulses.      Heart sounds: Normal heart sounds. No murmur heard.    No friction rub. No gallop.   Pulmonary:      Effort: Pulmonary effort is normal. No respiratory distress.      Breath sounds: Normal breath sounds. No wheezing, rhonchi or rales.      Comments: On 2 L NC  Chest:      Chest wall: No tenderness.   Abdominal:      General: Bowel sounds are normal.      Palpations: There is no mass.      Tenderness: There is no abdominal tenderness.   Musculoskeletal:         General: No swelling or tenderness. Normal range of motion.      Cervical back: Normal range of motion.      Right lower leg: No edema.      Left lower leg: No edema.   Lymphadenopathy:      Cervical: No cervical adenopathy.   Skin:     General: Skin is warm and dry.      Capillary Refill: Capillary refill takes less than 2 seconds.      Findings: No rash.   Neurological:      General: No focal deficit present.      Mental Status: She is alert and oriented to person, place, and time.      Cranial Nerves: No cranial nerve deficit.      Sensory: No sensory deficit.      Motor: No weakness.      Coordination: Coordination normal.      Comments: Pt with decreased hearing in L ear from previous shingles infxn as well as difficulty speaking and subjective R sided weakness residual from CVA     Recent Labs   Lab 05/30/22  0813 05/31/22  0334 06/01/22  0200   WBC 4.59 5.71 5.74   HGB 10.7* 10.6* 10.6*   HCT 32.8* 31.9* 32.5*   MCV 99* 97 97   RBC 3.32* 3.28* 3.34*   MCH 32.2* 32.3* 31.7*   MCHC 32.6 33.2 32.6   RDW 15.9* 15.5* 15.3*    169 188   MPV 9.6 9.2 9.8   GRAN 57.3  2.6 55.0  3.1 50.4  2.9   LYMPH 25.3  1.2 30.6  1.8 33.3  1.9   MONO 15.3*  0.7 12.4   0.7 14.3  0.8   EOSINOPHIL 1.7 1.4 1.4   BASOPHIL 0.2 0.2 0.3       Recent Labs   Lab 05/30/22  0813 05/31/22  0334 06/01/22  0200    139 140   K 3.6 3.4* 3.4*    100 102   CO2 27 29 28   ANIONGAP 11 10 10   BUN 24 23 23   CREATININE 0.8 0.9 0.8   GLU 83 83 93   CALCIUM 8.7 8.5* 8.4*   PROT 6.4 6.6 6.3   ALBUMIN 2.7* 2.6* 2.7*   ALKPHOS 72 64 76   BILITOT 0.3 0.4 0.4   ALT 12 16 13   AST 19 22 20   ESTGFRAFRICA >60 >60 >60   EGFRNONAA >60 56* >60   MG 1.6 1.4* 1.4*   PHOS 3.0 2.3* 2.3*       Recent Labs   Lab 05/27/22  1448   COLORU Yellow   APPEARANCEUA Clear   PHUR 6.0   SPECGRAV 1.020   PROTEINUA Negative   GLUCUA Negative   KETONESU Negative   BILIRUBINUA Negative   OCCULTUA Negative   UROBILINOGEN Negative   NITRITE Negative   LEUKOCYTESUR Negative       Recent Labs   Lab 05/27/22  1438 05/27/22  1640 05/28/22  1232 05/28/22  1918   TROPONINI 0.054* 0.055* 0.075* 0.072*       Recent Labs   Lab 05/28/22  1233   INR 1.1   APTT 29.1       No results for input(s): TSH, T0FNXMF, V1JLCXO, THYROIDAB, FREET4 in the last 168 hours.  X-Ray Chest 1 View    Result Date: 5/27/2022  EXAMINATION: XR CHEST 1 VIEW CLINICAL HISTORY: shortness of breath; TECHNIQUE: Single frontal view of the chest was performed. COMPARISON: 03/15/2022 FINDINGS: The lungs are under expanded with crowding of the pulmonary vascularity.  There is bibasilar patchy opacities which could reflect atelectasis, aspiration or pneumonia.  Nodular opacity in the left hilar region not as clearly seen on today's study.  The heart is enlarged.  Calcified atheromatous disease affects the tortuous aorta.  Skeletal structures are intact.     As above. Electronically signed by: Inna Patterson MD Date:    05/27/2022 Time:    14:56    Microbiology Results (last 7 days)       ** No results found for the last 168 hours. **                Assessment/Plan:      * Pneumonia due to COVID-19 virus  Pt w/ a new worsening nonproductive cough for 1 wk PTA w/o  relief from breathing tx at home  Pt denies any other sx's including SoB  Rapid COVID from ED positive  LA initially elevated but down trended w/ fluids  Procal wnl  ESR elevated to 84 as well as CRP to 52.4  CXR in ED with under expanded lungs and bibasilar patchy opacities  Pt requiring supplemental O2 via NC at admission and wears none at home  Given first doses of remdesivir and dexamethasone while still in ED    Plan:  Monitor O2 sat, maintain >90%, and adjust supplementation as necessary  Airborne, droplet, and contact precautions in place  Albuterol prn   Cnt remdesivir 100mg for 4 days ending 6/2 and Dexamethasone 6mg for 10 days ending 6/7  Vit C and multivitamin supplement   Incentive spirometry  PT to eval and tx   Ambulatory O2 today        JUAN FRANCISCO (acute kidney injury)  See CKD 3      Elevated troponin  Troponin elevated while in ED to 0.054 pk of 0.075  Pt w/o any new EKG changes or complaints of CP    Plan:  Trend trop and EKG       CKD (chronic kidney disease), stage III  Pt noted to have CKD 3 w/ gfr 56 previous but 36 on admit   JUAN FRANCISCO on admit w. Elevated BUN/crt 43/1.3 from BL 20/0.9  Pt denies any urinary sx's or decreased urination  BUN/Crt to baseline 5/30  Urine studies demonstrate pre-renal cause  JUAN FRANCISCO resolved 5/30    Plan:  Will hold nephrotoxic drugs and renally dose medications as necessary      History of CVA (cerebrovascular accident)  Pt had CVA w/ thorough Neuro F/U last seen 8/21 w/ rec to rtc in 1 yr  Pt denies any new neurological complaints or deficits at time of admit  Noted chronic difficulty speaking and subjective R sided weakness  Last Neuro notes new MRI with only old lacunar infarct changes   Per last Neuro recs cnt home asa and statin    Plan:  Cnt home asa and statin          Tremor  Cnt home primidone 50mg TID as well as metoprolol      Mild episode of recurrent major depressive disorder  Cnt home Citalopram 10mg daily         Hyperlipidemia  Pt on simvastatin 40 qhs at  home    Plan:  Will start equivalent atorvastatin 20 qhs while inpt      HTN (hypertension)  Pt adherent with home antihtn including amlodipine 2.5 qd, lisinopril 40mg qd, metoprolol succinate 2mg 24hr qd, and spironolactone-hydrochlorathiazide 25-25mg qd  Increased home amlodipine to 5qd on 5/30    Plan:  Cont to monitor vitals  Cont amlodipine and metoprolol but will hold diuretics in setting of JUAN FRANCISCO  Will titrate medications as needed and add hydralazine prn for SBP >180        VTE Risk Mitigation (From admission, onward)         Ordered     enoxaparin injection 80 mg  Every 24 hours (non-standard times)         05/28/22 1159                Discharge Planning   SEBASTIÁN:      Code Status: Full Code   Is the patient medically ready for discharge?:     Reason for patient still in hospital (select all that apply): Treatment and PT / OT recommendations  Discharge Plan A: Skilled Nursing Facility   Discharge Delays: None known at this time              Vamshi Goel MD   [facility] PGY-1

## 2022-06-01 NOTE — SUBJECTIVE & OBJECTIVE
Interval History: NAEON. Pt doing well still on 2L NC w/o complaints this AM, eating and drinking well, still without bowel movement since admission. Working PT, set to have amb O2 today.    Review of Systems   Constitutional:  Negative for activity change, appetite change, chills, fatigue and fever.   HENT:  Positive for congestion and hearing loss (L ear hard of hearing for several years). Negative for rhinorrhea, sinus pressure, sinus pain, sneezing, sore throat and trouble swallowing.    Eyes:  Negative for photophobia and visual disturbance.   Respiratory:  Positive for cough. Negative for apnea, shortness of breath and wheezing.    Cardiovascular:  Negative for chest pain, palpitations and leg swelling.   Gastrointestinal:  Negative for abdominal distention, abdominal pain, blood in stool, constipation, diarrhea, nausea and vomiting.   Genitourinary:  Negative for dysuria, hematuria and urgency.   Musculoskeletal:  Positive for back pain (Chronic). Negative for joint swelling and myalgias.   Skin:  Negative for wound.   Neurological:  Positive for tremors (Chronic facial twitching). Negative for dizziness, facial asymmetry, weakness, light-headedness, numbness and headaches.   Psychiatric/Behavioral:  The patient is not nervous/anxious.    Objective:     Vital Signs (Most Recent):  Temp: 97.7 °F (36.5 °C) (06/01/22 0523)  Pulse: 61 (06/01/22 0757)  Resp: 20 (06/01/22 0757)  BP: (!) 157/74 (06/01/22 0523)  SpO2: 97 % (06/01/22 0757) Vital Signs (24h Range):  Temp:  [97.3 °F (36.3 °C)-98.8 °F (37.1 °C)] 97.7 °F (36.5 °C)  Pulse:  [60-78] 61  Resp:  [18-20] 20  SpO2:  [92 %-97 %] 97 %  BP: (124-161)/(60-77) 157/74     Weight: 82.9 kg (182 lb 12.2 oz)  Body mass index is 35.69 kg/m².    Intake/Output Summary (Last 24 hours) at 6/1/2022 0840  Last data filed at 6/1/2022 0700  Gross per 24 hour   Intake --   Output 800 ml   Net -800 ml        Physical Exam  Vitals and nursing note reviewed.   Constitutional:        General: She is not in acute distress.     Appearance: Normal appearance. She is not toxic-appearing.   HENT:      Head: Normocephalic and atraumatic.      Right Ear: External ear normal.      Left Ear: External ear normal.      Nose: Nose normal. No congestion.      Mouth/Throat:      Mouth: Mucous membranes are moist.   Eyes:      Conjunctiva/sclera: Conjunctivae normal.      Pupils: Pupils are equal, round, and reactive to light.   Cardiovascular:      Rate and Rhythm: Normal rate and regular rhythm.      Pulses: Normal pulses.      Heart sounds: Normal heart sounds. No murmur heard.    No friction rub. No gallop.   Pulmonary:      Effort: Pulmonary effort is normal. No respiratory distress.      Breath sounds: Normal breath sounds. No wheezing, rhonchi or rales.      Comments: On 2 L NC  Chest:      Chest wall: No tenderness.   Abdominal:      General: Bowel sounds are normal.      Palpations: There is no mass.      Tenderness: There is no abdominal tenderness.   Musculoskeletal:         General: No swelling or tenderness. Normal range of motion.      Cervical back: Normal range of motion.      Right lower leg: No edema.      Left lower leg: No edema.   Lymphadenopathy:      Cervical: No cervical adenopathy.   Skin:     General: Skin is warm and dry.      Capillary Refill: Capillary refill takes less than 2 seconds.      Findings: No rash.   Neurological:      General: No focal deficit present.      Mental Status: She is alert and oriented to person, place, and time.      Cranial Nerves: No cranial nerve deficit.      Sensory: No sensory deficit.      Motor: No weakness.      Coordination: Coordination normal.      Comments: Pt with decreased hearing in L ear from previous shingles infxn as well as difficulty speaking and subjective R sided weakness residual from CVA     Recent Labs   Lab 05/30/22  0813 05/31/22  0334 06/01/22  0200   WBC 4.59 5.71 5.74   HGB 10.7* 10.6* 10.6*   HCT 32.8* 31.9* 32.5*   MCV 99*  97 97   RBC 3.32* 3.28* 3.34*   MCH 32.2* 32.3* 31.7*   MCHC 32.6 33.2 32.6   RDW 15.9* 15.5* 15.3*    169 188   MPV 9.6 9.2 9.8   GRAN 57.3  2.6 55.0  3.1 50.4  2.9   LYMPH 25.3  1.2 30.6  1.8 33.3  1.9   MONO 15.3*  0.7 12.4  0.7 14.3  0.8   EOSINOPHIL 1.7 1.4 1.4   BASOPHIL 0.2 0.2 0.3       Recent Labs   Lab 05/30/22  0813 05/31/22  0334 06/01/22  0200    139 140   K 3.6 3.4* 3.4*    100 102   CO2 27 29 28   ANIONGAP 11 10 10   BUN 24 23 23   CREATININE 0.8 0.9 0.8   GLU 83 83 93   CALCIUM 8.7 8.5* 8.4*   PROT 6.4 6.6 6.3   ALBUMIN 2.7* 2.6* 2.7*   ALKPHOS 72 64 76   BILITOT 0.3 0.4 0.4   ALT 12 16 13   AST 19 22 20   ESTGFRAFRICA >60 >60 >60   EGFRNONAA >60 56* >60   MG 1.6 1.4* 1.4*   PHOS 3.0 2.3* 2.3*       Recent Labs   Lab 05/27/22  1448   COLORU Yellow   APPEARANCEUA Clear   PHUR 6.0   SPECGRAV 1.020   PROTEINUA Negative   GLUCUA Negative   KETONESU Negative   BILIRUBINUA Negative   OCCULTUA Negative   UROBILINOGEN Negative   NITRITE Negative   LEUKOCYTESUR Negative       Recent Labs   Lab 05/27/22  1438 05/27/22  1640 05/28/22  1232 05/28/22  1918   TROPONINI 0.054* 0.055* 0.075* 0.072*       Recent Labs   Lab 05/28/22  1233   INR 1.1   APTT 29.1       No results for input(s): TSH, B9LYHVN, N7BRDFU, THYROIDAB, FREET4 in the last 168 hours.  X-Ray Chest 1 View    Result Date: 5/27/2022  EXAMINATION: XR CHEST 1 VIEW CLINICAL HISTORY: shortness of breath; TECHNIQUE: Single frontal view of the chest was performed. COMPARISON: 03/15/2022 FINDINGS: The lungs are under expanded with crowding of the pulmonary vascularity.  There is bibasilar patchy opacities which could reflect atelectasis, aspiration or pneumonia.  Nodular opacity in the left hilar region not as clearly seen on today's study.  The heart is enlarged.  Calcified atheromatous disease affects the tortuous aorta.  Skeletal structures are intact.     As above. Electronically signed by: Inna Patterson MD Date:    05/27/2022  Time:    14:56    Microbiology Results (last 7 days)       ** No results found for the last 168 hours. **

## 2022-06-01 NOTE — ASSESSMENT & PLAN NOTE
Pt adherent with home antihtn including amlodipine 2.5 qd, lisinopril 40mg qd, metoprolol succinate 2mg 24hr qd, and spironolactone-hydrochlorathiazide 25-25mg qd  Increased home amlodipine to 5qd on 5/30    Plan:  Cont to monitor vitals  Cont amlodipine and metoprolol but will hold diuretics in setting of JUAN FRANCISCO  Will titrate medications as needed and add hydralazine prn for SBP >180

## 2022-06-01 NOTE — PLAN OF CARE
Pt was accepted at [facility] via [facility].     Joseph spoke with pt's son Christos to discuss d/c plans. Sw informed Christos that pt has been accepted at [facility]. Christos requested some time to decide between [facility] or [facility]. Jospeh encouraged Christos to make a decision between facilities in a timely manner. Christos stated he will have a facility chosen tomorrow morning when he comes to the hospital to visit pt. Joseph encouraged pt to call with any questions or concerns.     Joseph will continue to follow pt for d/c planning.        06/01/22 2642   Post-Acute Status   Post-Acute Authorization Placement   Discharge Plan   Discharge Plan A Rehab;Skilled Nursing Facility

## 2022-06-01 NOTE — PLAN OF CARE
Joseph spoke with Denise with [facility]. Denise stated that pt is under review at [facility]. Denise requested updates clinicals be faxed via brotips. Joseph faxed updated clinicals via brotips. Denise stated she will update Sw either way if pt is accepted or not for [facility].    Joseph spoke with pt's son Christos. Joseph informed Christos that [facility] is a possibility for pt to transfer to. Christos stated that [facility] is out of the question due to being so far from home. Christos stated that he would like for pt to either transfer to the [facility] or [facility]. Joseph informed Christos that the [facility] requires pt to be having no covid treatments for 10 days prior to admit. Christos verbalized understanding of that. Joseph informed Christos that she will reach out to [facility] to see if they will be able to accept pt or not. Joseph encouraged Christos to call with any questions or concerns.     Joseph will continue to follow pt for d/c planning.        06/01/22 0644   Post-Acute Status   Post-Acute Authorization Placement

## 2022-06-01 NOTE — PLAN OF CARE
Patient on oxygen with documented flow.  Will attempt to wean per O2 order protocol. The proper method of use, as well as anticipated side effects, of this metered-dose inhaler are discussed and demonstrated to the patient. Lung expansion therapy. Will continue to monitor.

## 2022-06-01 NOTE — PT/OT/SLP PROGRESS
Physical Therapy Treatment    Patient Name:  Alysia Ross   MRN:  [id number]    Recommendations:     Discharge Recommendations:  nursing facility, skilled   Discharge Equipment Recommendations:  (TBD)   Barriers to discharge: decreased mobility,strength and endurance    Assessment:     Alysia Ross is a 89 y.o. female admitted with a medical diagnosis of Pneumonia due to COVID-19 virus.  She presents with the following impairments/functional limitations:  weakness, impaired endurance, impaired functional mobilty, gait instability, impaired balance, decreased upper extremity function, decreased lower extremity function, decreased ROM, impaired coordination, impaired skin, impaired cardiopulmonary response to activity,pt with good participation and requires assistance with all mobility at this time,pt will benefit from SNF upon discharge.    Rehab Prognosis: Fair; patient would benefit from acute skilled PT services to address these deficits and reach maximum level of function.    Recent Surgery: * No surgery found *      Plan:     During this hospitalization, patient to be seen 5 x/week to address the identified rehab impairments via gait training, therapeutic activities, therapeutic exercises, neuromuscular re-education and progress toward the following goals:    · Plan of Care Expires:  06/30/22    Subjective     Chief Complaint: n/a  Patient/Family Comments/goals: pt fatigued post rx.  Pain/Comfort:  · Pain Rating 1:  (no c/o's)      Objective:     Communicated with nsg prior to session.  Patient found supine with bed alarm, oxygen, peripheral IV, PureWick upon PT entry to room.     General Precautions: Standard, airborne, contact, droplet, fall   Orthopedic Precautions:N/A   Braces: N/A  Respiratory Status: Nasal cannula, flow 1 L/min     Functional Mobility:  · Bed Mobility:     · Supine to Sit: minimum assistance and increased time  · Sit to Supine: maximal assistance and of 2 persons  · Transfers:      · Sit to Stand:  minimum assistance and X 3 trials with rolling walker  · Gait: amb ~16' and 10' with RW and Min A with O2 donned and f/b chair  · Balance: fair standing balance with RW      AM-PAC 6 CLICK MOBILITY  Turning over in bed (including adjusting bedclothes, sheets and blankets)?: 3  Sitting down on and standing up from a chair with arms (e.g., wheelchair, bedside commode, etc.): 3  Moving from lying on back to sitting on the side of the bed?: 3  Moving to and from a bed to a chair (including a wheelchair)?: 3  Need to walk in hospital room?: 3  Climbing 3-5 steps with a railing?: 1  Basic Mobility Total Score: 16       Therapeutic Activities and Exercises: stepping in place X 5-7 reps inside RW and some ue ex's with SBA.       Patient left supine with all lines intact, call button in reach and bed alarm on..    GOALS: see general POC  Multidisciplinary Problems     Physical Therapy Goals        Problem: Physical Therapy    Goal Priority Disciplines Outcome Goal Variances Interventions   Physical Therapy Goal     PT, PT/OT Ongoing, Progressing     Description: Goals to be met by: 22     Patient will increase functional independence with mobility by performin. Supine to sit with Minimal Assistance  2. Sit to supine with Minimal Assistance  3. Rolling to Left and Right with Minimal Assistance.  4. Sit to stand transfer with Minimal Assistance  5. Bed to chair transfer with Minimal Assistance using Rolling Walker  6. Gait  x 50 feet with Minimal Assistance using Rolling Walker.                      Time Tracking:     PT Received On: 22  PT Start Time: 0950     PT Stop Time: 1130  PT Total Time (min): 100 min     Billable Minutes: Gait Training 17 and Total Time 40    Treatment Type: Treatment  PT/PTA: PTA     PTA Visit Number: 2     2022

## 2022-06-01 NOTE — ASSESSMENT & PLAN NOTE
Pt w/ a new worsening nonproductive cough for 1 wk PTA w/o relief from breathing tx at home  Pt denies any other sx's including SoB  Rapid COVID from ED positive  LA initially elevated but down trended w/ fluids  Procal wnl  ESR elevated to 84 as well as CRP to 52.4  CXR in ED with under expanded lungs and bibasilar patchy opacities  Pt requiring supplemental O2 via NC at admission and wears none at home  Given first doses of remdesivir and dexamethasone while still in ED    Plan:  Monitor O2 sat, maintain >90%, and adjust supplementation as necessary  Airborne, droplet, and contact precautions in place  Albuterol prn   Cnt remdesivir 100mg for 4 days ending 6/2 and Dexamethasone 6mg for 10 days ending 6/7  Vit C and multivitamin supplement   Incentive spirometry  PT to eval and tx   Ambulatory O2 today

## 2022-06-02 VITALS
WEIGHT: 182.75 LBS | TEMPERATURE: 98 F | DIASTOLIC BLOOD PRESSURE: 60 MMHG | HEART RATE: 78 BPM | SYSTOLIC BLOOD PRESSURE: 124 MMHG | RESPIRATION RATE: 18 BRPM | OXYGEN SATURATION: 92 % | BODY MASS INDEX: 35.88 KG/M2 | HEIGHT: 60 IN

## 2022-06-02 LAB
ALBUMIN SERPL BCP-MCNC: 2.5 G/DL (ref 3.5–5.2)
ALP SERPL-CCNC: 72 U/L (ref 55–135)
ALT SERPL W/O P-5'-P-CCNC: 16 U/L (ref 10–44)
ANION GAP SERPL CALC-SCNC: 12 MMOL/L (ref 8–16)
AST SERPL-CCNC: 20 U/L (ref 10–40)
BASOPHILS # BLD AUTO: 0.02 K/UL (ref 0–0.2)
BASOPHILS NFR BLD: 0.3 % (ref 0–1.9)
BILIRUB SERPL-MCNC: 0.4 MG/DL (ref 0.1–1)
BUN SERPL-MCNC: 21 MG/DL (ref 10–30)
CALCIUM SERPL-MCNC: 8.4 MG/DL (ref 8.7–10.5)
CHLORIDE SERPL-SCNC: 100 MMOL/L (ref 95–110)
CO2 SERPL-SCNC: 28 MMOL/L (ref 23–29)
CREAT SERPL-MCNC: 0.8 MG/DL (ref 0.5–1.4)
DIFFERENTIAL METHOD: ABNORMAL
EOSINOPHIL # BLD AUTO: 0.1 K/UL (ref 0–0.5)
EOSINOPHIL NFR BLD: 1.7 % (ref 0–8)
ERYTHROCYTE [DISTWIDTH] IN BLOOD BY AUTOMATED COUNT: 15.6 % (ref 11.5–14.5)
EST. GFR  (AFRICAN AMERICAN): >60 ML/MIN/1.73 M^2
EST. GFR  (NON AFRICAN AMERICAN): >60 ML/MIN/1.73 M^2
GLUCOSE SERPL-MCNC: 99 MG/DL (ref 70–110)
HCT VFR BLD AUTO: 31.4 % (ref 37–48.5)
HGB BLD-MCNC: 10.3 G/DL (ref 12–16)
IMM GRANULOCYTES # BLD AUTO: 0.02 K/UL (ref 0–0.04)
IMM GRANULOCYTES NFR BLD AUTO: 0.3 % (ref 0–0.5)
LYMPHOCYTES # BLD AUTO: 1.8 K/UL (ref 1–4.8)
LYMPHOCYTES NFR BLD: 27.8 % (ref 18–48)
MAGNESIUM SERPL-MCNC: 1.4 MG/DL (ref 1.6–2.6)
MCH RBC QN AUTO: 31.9 PG (ref 27–31)
MCHC RBC AUTO-ENTMCNC: 32.8 G/DL (ref 32–36)
MCV RBC AUTO: 97 FL (ref 82–98)
MONOCYTES # BLD AUTO: 0.9 K/UL (ref 0.3–1)
MONOCYTES NFR BLD: 14 % (ref 4–15)
NEUTROPHILS # BLD AUTO: 3.7 K/UL (ref 1.8–7.7)
NEUTROPHILS NFR BLD: 55.9 % (ref 38–73)
NRBC BLD-RTO: 0 /100 WBC
PHOSPHATE SERPL-MCNC: 2.2 MG/DL (ref 2.7–4.5)
PLATELET # BLD AUTO: 184 K/UL (ref 150–450)
PMV BLD AUTO: 9.7 FL (ref 9.2–12.9)
POTASSIUM SERPL-SCNC: 3.6 MMOL/L (ref 3.5–5.1)
PROT SERPL-MCNC: 6.3 G/DL (ref 6–8.4)
RBC # BLD AUTO: 3.23 M/UL (ref 4–5.4)
SODIUM SERPL-SCNC: 140 MMOL/L (ref 136–145)
WBC # BLD AUTO: 6.62 K/UL (ref 3.9–12.7)

## 2022-06-02 PROCEDURE — 27000221 HC OXYGEN, UP TO 24 HOURS

## 2022-06-02 PROCEDURE — 25000003 PHARM REV CODE 250: Performed by: STUDENT IN AN ORGANIZED HEALTH CARE EDUCATION/TRAINING PROGRAM

## 2022-06-02 PROCEDURE — 80053 COMPREHEN METABOLIC PANEL: CPT | Performed by: STUDENT IN AN ORGANIZED HEALTH CARE EDUCATION/TRAINING PROGRAM

## 2022-06-02 PROCEDURE — 85025 COMPLETE CBC W/AUTO DIFF WBC: CPT | Performed by: STUDENT IN AN ORGANIZED HEALTH CARE EDUCATION/TRAINING PROGRAM

## 2022-06-02 PROCEDURE — 25000003 PHARM REV CODE 250

## 2022-06-02 PROCEDURE — 36415 COLL VENOUS BLD VENIPUNCTURE: CPT | Performed by: STUDENT IN AN ORGANIZED HEALTH CARE EDUCATION/TRAINING PROGRAM

## 2022-06-02 PROCEDURE — 63600175 PHARM REV CODE 636 W HCPCS: Performed by: STUDENT IN AN ORGANIZED HEALTH CARE EDUCATION/TRAINING PROGRAM

## 2022-06-02 PROCEDURE — 99900035 HC TECH TIME PER 15 MIN (STAT)

## 2022-06-02 PROCEDURE — 83735 ASSAY OF MAGNESIUM: CPT | Performed by: STUDENT IN AN ORGANIZED HEALTH CARE EDUCATION/TRAINING PROGRAM

## 2022-06-02 PROCEDURE — 94799 UNLISTED PULMONARY SVC/PX: CPT

## 2022-06-02 PROCEDURE — 63600175 PHARM REV CODE 636 W HCPCS: Performed by: FAMILY MEDICINE

## 2022-06-02 PROCEDURE — 94761 N-INVAS EAR/PLS OXIMETRY MLT: CPT

## 2022-06-02 PROCEDURE — 11000001 HC ACUTE MED/SURG PRIVATE ROOM

## 2022-06-02 PROCEDURE — 84100 ASSAY OF PHOSPHORUS: CPT | Performed by: STUDENT IN AN ORGANIZED HEALTH CARE EDUCATION/TRAINING PROGRAM

## 2022-06-02 PROCEDURE — 27000207 HC ISOLATION

## 2022-06-02 PROCEDURE — 94640 AIRWAY INHALATION TREATMENT: CPT

## 2022-06-02 RX ORDER — AMLODIPINE BESYLATE 2.5 MG/1
5 TABLET ORAL DAILY
Qty: 90 TABLET | Refills: 1 | Status: SHIPPED | OUTPATIENT
Start: 2022-06-02 | End: 2022-01-01

## 2022-06-02 RX ORDER — LANOLIN ALCOHOL/MO/W.PET/CERES
400 CREAM (GRAM) TOPICAL 2 TIMES DAILY
Status: DISCONTINUED | OUTPATIENT
Start: 2022-06-02 | End: 2022-06-03 | Stop reason: HOSPADM

## 2022-06-02 RX ORDER — SODIUM,POTASSIUM PHOSPHATES 280-250MG
1 POWDER IN PACKET (EA) ORAL 3 TIMES DAILY
Status: DISCONTINUED | OUTPATIENT
Start: 2022-06-02 | End: 2022-06-03 | Stop reason: HOSPADM

## 2022-06-02 RX ADMIN — PRIMIDONE 50 MG: 50 TABLET ORAL at 09:06

## 2022-06-02 RX ADMIN — ENOXAPARIN SODIUM 80 MG: 100 INJECTION SUBCUTANEOUS at 03:06

## 2022-06-02 RX ADMIN — THERA TABS 1 TABLET: TAB at 09:06

## 2022-06-02 RX ADMIN — OXYCODONE HYDROCHLORIDE AND ACETAMINOPHEN 500 MG: 500 TABLET ORAL at 09:06

## 2022-06-02 RX ADMIN — ALBUTEROL SULFATE 2 PUFF: 90 AEROSOL, METERED RESPIRATORY (INHALATION) at 03:06

## 2022-06-02 RX ADMIN — POTASSIUM & SODIUM PHOSPHATES POWDER PACK 280-160-250 MG 1 PACKET: 280-160-250 PACK at 09:06

## 2022-06-02 RX ADMIN — ALBUTEROL SULFATE 2 PUFF: 90 AEROSOL, METERED RESPIRATORY (INHALATION) at 12:06

## 2022-06-02 RX ADMIN — Medication 400 MG: at 09:06

## 2022-06-02 RX ADMIN — AMLODIPINE BESYLATE 5 MG: 5 TABLET ORAL at 09:06

## 2022-06-02 RX ADMIN — POTASSIUM & SODIUM PHOSPHATES POWDER PACK 280-160-250 MG 1 PACKET: 280-160-250 PACK at 03:06

## 2022-06-02 RX ADMIN — ALBUTEROL SULFATE 2 PUFF: 90 AEROSOL, METERED RESPIRATORY (INHALATION) at 09:06

## 2022-06-02 RX ADMIN — ACETAMINOPHEN 650 MG: 325 TABLET ORAL at 02:06

## 2022-06-02 RX ADMIN — METOPROLOL SUCCINATE 25 MG: 25 TABLET, EXTENDED RELEASE ORAL at 09:06

## 2022-06-02 RX ADMIN — DEXAMETHASONE 6 MG: 4 TABLET ORAL at 09:06

## 2022-06-02 RX ADMIN — CITALOPRAM HYDROBROMIDE 10 MG: 10 TABLET ORAL at 09:06

## 2022-06-02 RX ADMIN — PRIMIDONE 50 MG: 50 TABLET ORAL at 02:06

## 2022-06-02 RX ADMIN — GUAIFENESIN AND DEXTROMETHORPHAN HYDROBROMIDE 1 TABLET: 600; 30 TABLET, EXTENDED RELEASE ORAL at 09:06

## 2022-06-02 RX ADMIN — ATORVASTATIN CALCIUM 20 MG: 20 TABLET, FILM COATED ORAL at 09:06

## 2022-06-02 NOTE — DISCHARGE SUMMARY
[facility] Medicine  Discharge Summary      Patient Name: Alysia Ross  MRN: [id number]  Patient Class: IP- Inpatient  Admission Date: 5/28/2022  Hospital Length of Stay: 5 days  Discharge Date and Time:  06/02/2022 6:58 PM  Attending Physician: Nadiya Capellan MD   Discharging Provider: Vamshi Goel MD  Primary Care Provider: Rose Johnston NP      HPI:   89 yo F w/ PMHx of HTN, HLD, CKD3, MDD, remote hx CVA >15 yrs ago, and tremor who was transferred from [facility] for COVID infxn. Pt reports that for the past wk has had a worsening non-productive cough but denies any F, C, SoB, CP, HA, fatigue, muscle aches, N, V, D, or C. She had been hospitalized in 7/21 for bronchitis and given breathing tx and O2 at d/c; however, she was eventually weaned off from home O2. She tried taking left over breathing tx at home for her cough but did not notice any improvement in her sxs. She reports using a walker at home and lives with her son and his family and relies on them for most ADLs except going to the bathroom and changing. She was vaccinated against covid but did not receive a booster. She denies any sick contacts or any other sx's at this time.     In the ED pt temp 100 HR 90 RR 18 /58 SpO2 92% on RA. A rapid covid test was positive. CBC unremarkable, CMP w/ elevated BUN/Crt to 43/ 1.3 from baseline 20/0.9. LA initally 3.2 but down trended to 1.4 while in the ED. Troponin was elevated to 0.054 and up to 0.055 as well as BNP to 465. Procal wnl and UA neg. Pt was placed on 2L NC and 1L NS bolus as well as the first dose of remdesivir and dexamethasone. [place] [facility] [place] COVID infxn.        * No surgery found *      Hospital Course:   Patient remained stable and tolerated COVID treatment with complete five-day course of Remdesivir and six days of Dexamethasone per protocol. Patient weaned from 3L on admit to room air. On 6/1, patient maintained O2 sat  >90% on room air for duration of ambulatory O2 test. Patient worked with PT who deemed her a good candidate for inpatient rehab and patient expressed equal interest in said placement. Patient accepted to [facility] inpatient rehab, discharged on 6/2 feeling well, in good condition, vitals stable on room air.       Goals of Care Treatment Preferences:  Code Status: Full Code      Consults:     No new Assessment & Plan notes have been filed under this hospital service since the last note was generated.  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    Medications:  Reconciled Home Medications:      Medication List      CHANGE how you take these medications    amLODIPine 2.5 MG tablet  Commonly known as: NORVASC  Take 2 tablets (5 mg total) by mouth once daily.  What changed: how much to take        CONTINUE taking these medications    albuterol-ipratropium 2.5 mg-0.5 mg/3 mL nebulizer solution  Commonly known as: DUO-NEB  Take 3 mLs by nebulization every 6 (six) hours as needed for Wheezing. Rescue     alendronate 70 MG tablet  Commonly known as: FOSAMAX  TAKE ONE TABLET BY MOUTH ONCE EVERY 7 DAYS     aspirin 81 MG EC tablet  Commonly known as: ECOTRIN  Take 1 tablet by mouth Daily.     citalopram 10 MG tablet  Commonly known as: CeleXA  TAKE ONE TABLET BY MOUTH  ONCE A DAY     clotrimazole-betamethasone 1-0.05% cream  Commonly known as: LOTRISONE  Apply topically 2 (two) times daily.     gentamicin 0.1 % ointment  Commonly known as: GARAMYCIN  Apply topically 3 (three) times daily.     LIDOcaine 5 %  Commonly known as: LIDODERM  Place 1 patch onto the skin once daily. Remove & Discard patch within 12 hours or as directed by MD     lisinopriL 40 MG tablet  Commonly known as: PRINIVIL,ZESTRIL  TAKE ONE TABLET BY MOUTH ONCE A DAY     metoprolol succinate 25 MG 24 hr tablet  Commonly known as: TOPROL-XL  TAKE ONE TABLET BY MOUTH ONCE A DAY     nystatin powder  Commonly known as: MYCOSTATIN  Apply topically 2 (two) times daily.     primidone 50 MG Tab  Commonly known as: MYSOLINE  TAKE ONE TABLET BY MOUTH THREE TIMES DAILY     simvastatin 40 MG tablet  Commonly known as: ZOCOR  Take 1 tablet (40 mg total) by mouth every evening.     spironolactone-hydrochlorothiazide 25-25mg 25-25 mg Tab  Commonly known as: ALDACTAZIDE  TAKE ONE TABLET BY MOUTH ONCE A DAY        STOP taking these medications    clonazePAM 0.25 MG Tbdl  Commonly known as: KlonoPIN            Indwelling Lines/Drains at time of discharge:   Lines/Drains/Airways     None                 Time spent on the discharge of patient: 35 minutes         Vamshi Goel MD  [facility] PGY-1

## 2022-06-02 NOTE — SUBJECTIVE & OBJECTIVE
Interval History: NAEON. Pt doing well off O2, stable for discharge. Placement pending son choosing facility.    Review of Systems   Constitutional:  Negative for activity change, appetite change, chills, fatigue and fever.   HENT:  Positive for congestion and hearing loss (L ear hard of hearing for several years). Negative for rhinorrhea, sinus pressure, sinus pain, sneezing, sore throat and trouble swallowing.    Eyes:  Negative for photophobia and visual disturbance.   Respiratory:  Positive for cough. Negative for apnea, shortness of breath and wheezing.    Cardiovascular:  Negative for chest pain, palpitations and leg swelling.   Gastrointestinal:  Negative for abdominal distention, abdominal pain, blood in stool, constipation, diarrhea, nausea and vomiting.   Genitourinary:  Negative for dysuria, hematuria and urgency.   Musculoskeletal:  Positive for back pain (Chronic). Negative for joint swelling and myalgias.   Skin:  Negative for wound.   Neurological:  Positive for tremors (Chronic facial twitching). Negative for dizziness, facial asymmetry, weakness, light-headedness, numbness and headaches.   Psychiatric/Behavioral:  The patient is not nervous/anxious.    Objective:     Vital Signs (Most Recent):  Temp: 98 °F (36.7 °C) (06/02/22 1057)  Pulse: 77 (06/02/22 1057)  Resp: 16 (06/02/22 1057)  BP: 124/60 (06/02/22 1057)  SpO2: (!) 92 % (06/02/22 1057) Vital Signs (24h Range):  Temp:  [97.5 °F (36.4 °C)-99.3 °F (37.4 °C)] 98 °F (36.7 °C)  Pulse:  [60-77] 77  Resp:  [16-20] 16  SpO2:  [91 %-95 %] 92 %  BP: (116-151)/(60-73) 124/60     Weight: 82.9 kg (182 lb 12.2 oz)  Body mass index is 35.69 kg/m².    Intake/Output Summary (Last 24 hours) at 6/2/2022 1114  Last data filed at 6/2/2022 0600  Gross per 24 hour   Intake --   Output 650 ml   Net -650 ml        Physical Exam  Vitals and nursing note reviewed.   Constitutional:       General: She is not in acute distress.     Appearance: Normal appearance. She is  not toxic-appearing.   HENT:      Head: Normocephalic and atraumatic.      Right Ear: External ear normal.      Left Ear: External ear normal.      Nose: Nose normal. No congestion.      Mouth/Throat:      Mouth: Mucous membranes are moist.   Eyes:      Conjunctiva/sclera: Conjunctivae normal.      Pupils: Pupils are equal, round, and reactive to light.   Cardiovascular:      Rate and Rhythm: Normal rate and regular rhythm.      Pulses: Normal pulses.      Heart sounds: Normal heart sounds. No murmur heard.    No friction rub. No gallop.   Pulmonary:      Effort: Pulmonary effort is normal. No respiratory distress.      Breath sounds: Normal breath sounds. No wheezing, rhonchi or rales.      Comments: On 2 L NC  Chest:      Chest wall: No tenderness.   Abdominal:      General: Bowel sounds are normal.      Palpations: There is no mass.      Tenderness: There is no abdominal tenderness.   Musculoskeletal:         General: No swelling or tenderness. Normal range of motion.      Cervical back: Normal range of motion.      Right lower leg: No edema.      Left lower leg: No edema.   Lymphadenopathy:      Cervical: No cervical adenopathy.   Skin:     General: Skin is warm and dry.      Capillary Refill: Capillary refill takes less than 2 seconds.      Findings: No rash.   Neurological:      General: No focal deficit present.      Mental Status: She is alert and oriented to person, place, and time.      Cranial Nerves: No cranial nerve deficit.      Sensory: No sensory deficit.      Motor: No weakness.      Coordination: Coordination normal.      Comments: Pt with decreased hearing in L ear from previous shingles infxn as well as difficulty speaking and subjective R sided weakness residual from CVA     Recent Labs   Lab 05/31/22  0334 06/01/22  0200 06/02/22  0434   WBC 5.71 5.74 6.62   HGB 10.6* 10.6* 10.3*   HCT 31.9* 32.5* 31.4*   MCV 97 97 97   RBC 3.28* 3.34* 3.23*   MCH 32.3* 31.7* 31.9*   MCHC 33.2 32.6 32.8   RDW  15.5* 15.3* 15.6*    188 184   MPV 9.2 9.8 9.7   GRAN 55.0  3.1 50.4  2.9 55.9  3.7   LYMPH 30.6  1.8 33.3  1.9 27.8  1.8   MONO 12.4  0.7 14.3  0.8 14.0  0.9   EOSINOPHIL 1.4 1.4 1.7   BASOPHIL 0.2 0.3 0.3       Recent Labs   Lab 05/31/22  0334 06/01/22  0200 06/02/22  0434    140 140   K 3.4* 3.4* 3.6    102 100   CO2 29 28 28   ANIONGAP 10 10 12   BUN 23 23 21   CREATININE 0.9 0.8 0.8   GLU 83 93 99   CALCIUM 8.5* 8.4* 8.4*   PROT 6.6 6.3 6.3   ALBUMIN 2.6* 2.7* 2.5*   ALKPHOS 64 76 72   BILITOT 0.4 0.4 0.4   ALT 16 13 16   AST 22 20 20   ESTGFRAFRICA >60 >60 >60   EGFRNONAA 56* >60 >60   MG 1.4* 1.4* 1.4*   PHOS 2.3* 2.3* 2.2*       Recent Labs   Lab 05/27/22  1448   COLORU Yellow   APPEARANCEUA Clear   PHUR 6.0   SPECGRAV 1.020   PROTEINUA Negative   GLUCUA Negative   KETONESU Negative   BILIRUBINUA Negative   OCCULTUA Negative   UROBILINOGEN Negative   NITRITE Negative   LEUKOCYTESUR Negative       Recent Labs   Lab 05/27/22  1438 05/27/22  1640 05/28/22  1232 05/28/22  1918   TROPONINI 0.054* 0.055* 0.075* 0.072*       Recent Labs   Lab 05/28/22  1233   INR 1.1   APTT 29.1       No results for input(s): TSH, R1ZIYUM, H4UVTTX, THYROIDAB, FREET4 in the last 168 hours.  X-Ray Chest 1 View    Result Date: 5/27/2022  EXAMINATION: XR CHEST 1 VIEW CLINICAL HISTORY: shortness of breath; TECHNIQUE: Single frontal view of the chest was performed. COMPARISON: 03/15/2022 FINDINGS: The lungs are under expanded with crowding of the pulmonary vascularity.  There is bibasilar patchy opacities which could reflect atelectasis, aspiration or pneumonia.  Nodular opacity in the left hilar region not as clearly seen on today's study.  The heart is enlarged.  Calcified atheromatous disease affects the tortuous aorta.  Skeletal structures are intact.     As above. Electronically signed by: Inna Patterson MD Date:    05/27/2022 Time:    14:56    Microbiology Results (last 7 days)       ** No results found  for the last 168 hours. **

## 2022-06-02 NOTE — PLAN OF CARE
Joseph met with pt and pt's son Christos to discuss d/c planning. Joseph used [employer]. Joseph discussed with pt and Christos about pt transferring to [facility] or [facility] upon d/c. Pt and Christos are agreeable for pt to transfer to a facility upon d/c. Pt and Christos have not chosen a facility yet. Christos stated they will choose a facility once they speak with the attending MD this morning. Joseph encouraged pt and Christos to choose a facility in a timely manner. Joseph informed attending MD via secure chat. Joseph encouraged pt and Christos to call with any questions or concerns. Joseph will continue to follow pt for d/c planning.        06/02/22 0915   Post-Acute Status   Post-Acute Authorization Placement

## 2022-06-02 NOTE — ASSESSMENT & PLAN NOTE
Pt w/ a new worsening nonproductive cough for 1 wk PTA w/o relief from breathing tx at home  Pt denies any other sx's including SoB  Rapid COVID from ED positive  LA initially elevated but down trended w/ fluids  Procal wnl  ESR elevated to 84 as well as CRP to 52.4  CXR in ED with under expanded lungs and bibasilar patchy opacities  Pt requiring supplemental O2 via NC at admission and wears none at home  Given first doses of remdesivir and dexamethasone while still in ED    Plan:  Monitor O2 sat, maintain >90%, and adjust supplementation as necessary  Airborne, droplet, and contact precautions in place  Albuterol prn   Cnt remdesivir 100mg for 4 days ending 6/2 and Dexamethasone 6mg for 10 days ending 6/7  Vit C and multivitamin supplement   Incentive spirometry  PT to eval and tx   6/1 - passed ambulatory O2 on room air

## 2022-06-02 NOTE — PLAN OF CARE
Problem: Adult Inpatient Plan of Care  Goal: Plan of Care Review  Outcome: Ongoing, Progressing     Plan of care reviewed with patient. Pt verbalized understanding. Pt is AAO x 4 denies SOB, on 1 L NC, c/o some back pain overnight, repositioned and medication administered, mild relief obtained. All medications administered as prescribed. Pt is NSR on Tele, no true red alarms. Covid precautions maintained. Pt is currently resting, bed in lowest position, HOB lowered, side rails up x 2, bed alarm activated, call light and bedside table within reach. Pt instructed to call if assistance is needed.

## 2022-06-02 NOTE — NURSING
Report received from Kuldeep HOLLOWAY, care assumed. Pt lying in bed with patent IV , respiration unlabored on 1L of O2 via nasal cannula  , tele-monitor on , and no sign or symptoms of distress. Pt board has been updated with RN information and plan for today . Pt has no questions or concerns at this time . Safe/fall precautions in place.

## 2022-06-02 NOTE — PLAN OF CARE
Pt on nasal cannula flow as documented in flowsheets.  MDI tx given. Pt in no apparent respiratory distress.  Will continue to monitor.

## 2022-06-02 NOTE — PLAN OF CARE
[facility]    FACILITY TRANSFER ORDERS      Patient Name: Alysia Ross  YOB: 1931    PCP: Rose Johnston NP   PCP Address: [facility] [address]  [place] [address]  PCP Phone Number: [phone]  PCP Fax: [phone]    Encounter Date: 06/02/2022    Admit to: [facility]    Vital Signs:  Routine    Diagnoses:   Active Hospital Problems    Diagnosis  POA    *Pneumonia due to COVID-19 virus [U07.1, J12.82]  Yes    History of CVA (cerebrovascular accident) [Z86.73]  Not Applicable    Tremor [R25.1]  Yes    Mild episode of recurrent major depressive disorder [F33.0]  Yes    Hyperlipidemia [E78.5]  Yes    HTN (hypertension) [I10]  Yes      Resolved Hospital Problems    Diagnosis Date Resolved POA    Osteoporosis [M81.0] 05/28/2022 Yes       Allergies:  Review of patient's allergies indicates:   Allergen Reactions    Sulfa (sulfonamide antibiotics) Hives    Penicillins Rash       Diet: cardiac diet    Activities: Activity as tolerated and Up with assistance    Labs: None      CONSULTS:    Physical Therapy to evaluate and treat.     MISCELLANEOUS CARE:  Routine Skin for Bedridden Patients: Apply moisture barrier cream to all skin folds and wet areas in perineal area daily and after baths and all bowel movements.    WOUND CARE ORDERS  None    Medications: Review discharge medications with patient and family and provide education.      Current Discharge Medication List      CONTINUE these medications which have CHANGED    Details   amLODIPine (NORVASC) 2.5 MG tablet Take 2 tablets (5 mg total) by mouth once daily.  Qty: 90 tablet, Refills: 1    Comments: This prescription was filled on 10/21/2021. Any refills authorized will be placed on file.  Associated Diagnoses: Essential hypertension         CONTINUE these medications which have NOT CHANGED    Details   albuterol-ipratropium (DUO-NEB) 2.5 mg-0.5 mg/3 mL nebulizer solution Take 3 mLs by nebulization every 6 (six) hours as  needed for Wheezing. Rescue  Qty: 1 each, Refills: 5    Associated Diagnoses: SOB (shortness of breath)      alendronate (FOSAMAX) 70 MG tablet TAKE ONE TABLET BY MOUTH ONCE EVERY 7 DAYS  Qty: 12 tablet, Refills: 1    Comments: This prescription was filled on 10/21/2021. Any refills authorized will be placed on file.  Associated Diagnoses: Osteoporosis, unspecified osteoporosis type, unspecified pathological fracture presence      aspirin (ECOTRIN) 81 MG EC tablet Take 1 tablet by mouth Daily.      citalopram (CELEXA) 10 MG tablet TAKE ONE TABLET BY MOUTH ONCE A DAY  Qty: 90 tablet, Refills: 1    Comments: This prescription was filled on 10/21/2021. Any refills authorized will be placed on file.  Associated Diagnoses: Depression, unspecified depression type      clotrimazole-betamethasone 1-0.05% (LOTRISONE) cream Apply topically 2 (two) times daily.  Qty: 45 g, Refills: 2    Associated Diagnoses: Candidiasis, intertrigo      gentamicin (GARAMYCIN) 0.1 % ointment Apply topically 3 (three) times daily.  Qty: 30 g, Refills: 2    Associated Diagnoses: Pressure injury of buttock, stage 1, unspecified laterality      lidocaine (LIDODERM) 5 % Place 1 patch onto the skin once daily. Remove & Discard patch within 12 hours or as directed by MD  Qty: 30 patch, Refills: 2    Associated Diagnoses: Chronic right-sided low back pain without sciatica      lisinopriL (PRINIVIL,ZESTRIL) 40 MG tablet TAKE ONE TABLET BY MOUTH ONCE A DAY  Qty: 90 tablet, Refills: 1    Comments: This prescription was filled on 2/7/2022. Any refills authorized will be placed on file.  Associated Diagnoses: Essential hypertension      metoprolol succinate (TOPROL-XL) 25 MG 24 hr tablet TAKE ONE TABLET BY MOUTH ONCE A DAY  Qty: 90 tablet, Refills: 1    Comments: This prescription was filled on 1/17/2022. Any refills authorized will be placed on file.  Associated Diagnoses: Essential hypertension      nystatin (MYCOSTATIN) powder Apply topically 2 (two)  times daily.  Qty: 60 g, Refills: 2    Associated Diagnoses: Candidiasis, intertrigo      primidone (MYSOLINE) 50 MG Tab TAKE ONE TABLET BY MOUTH THREE TIMES DAILY  Qty: 270 tablet, Refills: 1    Comments: This prescription was filled on 10/21/2021. Any refills authorized will be placed on file.  Associated Diagnoses: Tremor      simvastatin (ZOCOR) 40 MG tablet Take 1 tablet (40 mg total) by mouth every evening.  Qty: 90 tablet, Refills: 1    Comments: This prescription was filled on 6/10/2021. Any refills authorized will be placed on file.      spironolactone-hydrochlorothiazide 25-25mg (ALDACTAZIDE) 25-25 mg Tab TAKE ONE TABLET BY MOUTH ONCE A DAY  Qty: 90 tablet, Refills: 1    Comments: This prescription was filled on 11/10/2021. Any refills authorized will be placed on file.  Associated Diagnoses: Essential hypertension; Dependent edema         STOP taking these medications       clonazePAM (KLONOPIN) 0.25 MG TbDL Comments:   Reason for Stopping:                  Immunizations Administered as of 6/2/2022     Name Date Dose VIS Date Route Exp Date    COVID-19, MRNA, LN-S, PF (Pfizer) (Purple Cap) 8/11/2021 0.3 mL -- Intramuscular --    Site: Right deltoid     : Pf[employer] Inc     Lot: [id number]     External: MyChart Entered     COVID-19, MRNA, LN-S, PF (Pfizer) (Purple Cap) 7/21/2021  4:14 PM 0.3 mL 12/12/2020 Intramuscular 7/21/2021    Site: Right arm     Given By: Nieves Stafford RN     : Pf[employer]     Lot: [id number]           This patient has had both covid vaccinations    Some patients may experience side effects after vaccination.  These may include fever, headache, muscle or joint aches.  Most symptoms resolve with 24-48 hours and do not require urgent medical evaluation unless they persist for more than 72 hours or symptoms are concerning for an unrelated medical condition.          _________________________________  Vamshi Goel MD   [facility] PGY-1  06/02/2022

## 2022-06-02 NOTE — PROGRESS NOTES
[facility] Medicine  Progress Note    Patient Name: Alysia Ross  MRN: [id number]  Patient Class: IP- Inpatient   Admission Date: 5/28/2022  Length of Stay: 5 days  Attending Physician: Nadiya Capellan MD  Primary Care Provider: Rose Johnston NP        Subjective:     Principal Problem:Pneumonia due to COVID-19 virus        HPI:  89 yo F w/ PMHx of HTN, HLD, CKD3, MDD, remote hx CVA >15 yrs ago, and tremor who was transferred from [facility] for COVID infxn. Pt reports that for the past wk has had a worsening non-productive cough but denies any F, C, SoB, CP, HA, fatigue, muscle aches, N, V, D, or C. She had been hospitalized in 7/21 for bronchitis and given breathing tx and O2 at d/c; however, she was eventually weaned off from home O2. She tried taking left over breathing tx at home for her cough but did not notice any improvement in her sxs. She reports using a walker at home and lives with her son and his family and relies on them for most ADLs except going to the bathroom and changing. She was vaccinated against covid but did not receive a booster. She denies any sick contacts or any other sx's at this time.     In the ED pt temp 100 HR 90 RR 18 /58 SpO2 92% on RA. A rapid covid test was positive. CBC unremarkable, CMP w/ elevated BUN/Crt to 43/ 1.3 from baseline 20/0.9. LA initally 3.2 but down trended to 1.4 while in the ED. Troponin was elevated to 0.054 and up to 0.055 as well as BNP to 465. Procal wnl and UA neg. Pt was placed on 2L NC and 1L NS bolus as well as the first dose of remdesivir and dexamethasone. Pt was transferred to [facility] for further tx of her COVID infxn.        Overview/Hospital Course:  Patient remained stable and tolerated COVID treatment with complete five-day course of Remdesivir and five days of Dexamethasone per protocol. Patient weaned from 3L on admit to room air. On 6/1, patient maintained O2 sat >90% on room air for duration of ambulatory  O2 test. Patient both interested in and, per PT/OT, a good candidate for discharge to inpatient rehab.      Interval History: NAEON. Pt doing well off O2, stable for discharge. Placement pending son choosing facility.    Review of Systems   Constitutional:  Negative for activity change, appetite change, chills, fatigue and fever.   HENT:  Positive for congestion and hearing loss (L ear hard of hearing for several years). Negative for rhinorrhea, sinus pressure, sinus pain, sneezing, sore throat and trouble swallowing.    Eyes:  Negative for photophobia and visual disturbance.   Respiratory:  Positive for cough. Negative for apnea, shortness of breath and wheezing.    Cardiovascular:  Negative for chest pain, palpitations and leg swelling.   Gastrointestinal:  Negative for abdominal distention, abdominal pain, blood in stool, constipation, diarrhea, nausea and vomiting.   Genitourinary:  Negative for dysuria, hematuria and urgency.   Musculoskeletal:  Positive for back pain (Chronic). Negative for joint swelling and myalgias.   Skin:  Negative for wound.   Neurological:  Positive for tremors (Chronic facial twitching). Negative for dizziness, facial asymmetry, weakness, light-headedness, numbness and headaches.   Psychiatric/Behavioral:  The patient is not nervous/anxious.    Objective:     Vital Signs (Most Recent):  Temp: 98 °F (36.7 °C) (06/02/22 1057)  Pulse: 77 (06/02/22 1057)  Resp: 16 (06/02/22 1057)  BP: 124/60 (06/02/22 1057)  SpO2: (!) 92 % (06/02/22 1057) Vital Signs (24h Range):  Temp:  [97.5 °F (36.4 °C)-99.3 °F (37.4 °C)] 98 °F (36.7 °C)  Pulse:  [60-77] 77  Resp:  [16-20] 16  SpO2:  [91 %-95 %] 92 %  BP: (116-151)/(60-73) 124/60     Weight: 82.9 kg (182 lb 12.2 oz)  Body mass index is 35.69 kg/m².    Intake/Output Summary (Last 24 hours) at 6/2/2022 1114  Last data filed at 6/2/2022 0600  Gross per 24 hour   Intake --   Output 650 ml   Net -650 ml        Physical Exam  Vitals and nursing note reviewed.    Constitutional:       General: She is not in acute distress.     Appearance: Normal appearance. She is not toxic-appearing.   HENT:      Head: Normocephalic and atraumatic.      Right Ear: External ear normal.      Left Ear: External ear normal.      Nose: Nose normal. No congestion.      Mouth/Throat:      Mouth: Mucous membranes are moist.   Eyes:      Conjunctiva/sclera: Conjunctivae normal.      Pupils: Pupils are equal, round, and reactive to light.   Cardiovascular:      Rate and Rhythm: Normal rate and regular rhythm.      Pulses: Normal pulses.      Heart sounds: Normal heart sounds. No murmur heard.    No friction rub. No gallop.   Pulmonary:      Effort: Pulmonary effort is normal. No respiratory distress.      Breath sounds: Normal breath sounds. No wheezing, rhonchi or rales.      Comments: On 2 L NC  Chest:      Chest wall: No tenderness.   Abdominal:      General: Bowel sounds are normal.      Palpations: There is no mass.      Tenderness: There is no abdominal tenderness.   Musculoskeletal:         General: No swelling or tenderness. Normal range of motion.      Cervical back: Normal range of motion.      Right lower leg: No edema.      Left lower leg: No edema.   Lymphadenopathy:      Cervical: No cervical adenopathy.   Skin:     General: Skin is warm and dry.      Capillary Refill: Capillary refill takes less than 2 seconds.      Findings: No rash.   Neurological:      General: No focal deficit present.      Mental Status: She is alert and oriented to person, place, and time.      Cranial Nerves: No cranial nerve deficit.      Sensory: No sensory deficit.      Motor: No weakness.      Coordination: Coordination normal.      Comments: Pt with decreased hearing in L ear from previous shingles infxn as well as difficulty speaking and subjective R sided weakness residual from CVA     Recent Labs   Lab 05/31/22  0334 06/01/22  0200 06/02/22  0434   WBC 5.71 5.74 6.62   HGB 10.6* 10.6* 10.3*   HCT 31.9*  32.5* 31.4*   MCV 97 97 97   RBC 3.28* 3.34* 3.23*   MCH 32.3* 31.7* 31.9*   MCHC 33.2 32.6 32.8   RDW 15.5* 15.3* 15.6*    188 184   MPV 9.2 9.8 9.7   GRAN 55.0  3.1 50.4  2.9 55.9  3.7   LYMPH 30.6  1.8 33.3  1.9 27.8  1.8   MONO 12.4  0.7 14.3  0.8 14.0  0.9   EOSINOPHIL 1.4 1.4 1.7   BASOPHIL 0.2 0.3 0.3       Recent Labs   Lab 05/31/22  0334 06/01/22  0200 06/02/22  0434    140 140   K 3.4* 3.4* 3.6    102 100   CO2 29 28 28   ANIONGAP 10 10 12   BUN 23 23 21   CREATININE 0.9 0.8 0.8   GLU 83 93 99   CALCIUM 8.5* 8.4* 8.4*   PROT 6.6 6.3 6.3   ALBUMIN 2.6* 2.7* 2.5*   ALKPHOS 64 76 72   BILITOT 0.4 0.4 0.4   ALT 16 13 16   AST 22 20 20   ESTGFRAFRICA >60 >60 >60   EGFRNONAA 56* >60 >60   MG 1.4* 1.4* 1.4*   PHOS 2.3* 2.3* 2.2*       Recent Labs   Lab 05/27/22  1448   COLORU Yellow   APPEARANCEUA Clear   PHUR 6.0   SPECGRAV 1.020   PROTEINUA Negative   GLUCUA Negative   KETONESU Negative   BILIRUBINUA Negative   OCCULTUA Negative   UROBILINOGEN Negative   NITRITE Negative   LEUKOCYTESUR Negative       Recent Labs   Lab 05/27/22  1438 05/27/22  1640 05/28/22  1232 05/28/22  1918   TROPONINI 0.054* 0.055* 0.075* 0.072*       Recent Labs   Lab 05/28/22  1233   INR 1.1   APTT 29.1       No results for input(s): TSH, H2AIAVM, M9RNAVE, THYROIDAB, FREET4 in the last 168 hours.  X-Ray Chest 1 View    Result Date: 5/27/2022  EXAMINATION: XR CHEST 1 VIEW CLINICAL HISTORY: shortness of breath; TECHNIQUE: Single frontal view of the chest was performed. COMPARISON: 03/15/2022 FINDINGS: The lungs are under expanded with crowding of the pulmonary vascularity.  There is bibasilar patchy opacities which could reflect atelectasis, aspiration or pneumonia.  Nodular opacity in the left hilar region not as clearly seen on today's study.  The heart is enlarged.  Calcified atheromatous disease affects the tortuous aorta.  Skeletal structures are intact.     As above. Electronically signed by: Inna Patterson,  MD Date:    05/27/2022 Time:    14:56    Microbiology Results (last 7 days)       ** No results found for the last 168 hours. **                Assessment/Plan:      * Pneumonia due to COVID-19 virus  Pt w/ a new worsening nonproductive cough for 1 wk PTA w/o relief from breathing tx at home  Pt denies any other sx's including SoB  Rapid COVID from ED positive  LA initially elevated but down trended w/ fluids  Procal wnl  ESR elevated to 84 as well as CRP to 52.4  CXR in ED with under expanded lungs and bibasilar patchy opacities  Pt requiring supplemental O2 via NC at admission and wears none at home  Given first doses of remdesivir and dexamethasone while still in ED    Plan:  Monitor O2 sat, maintain >90%, and adjust supplementation as necessary  Airborne, droplet, and contact precautions in place  Albuterol prn   Cnt remdesivir 100mg for 4 days ending 6/2 and Dexamethasone 6mg for 10 days ending 6/7  Vit C and multivitamin supplement   Incentive spirometry  PT to eval and tx   6/1 - passed ambulatory O2 on room air        JUAN FRANCISCO (acute kidney injury)  See CKD 3      Elevated troponin  Troponin elevated while in ED to 0.054 pk of 0.075  Pt w/o any new EKG changes or complaints of CP    Plan:  Trend trop and EKG       CKD (chronic kidney disease), stage III  Pt noted to have CKD 3 w/ gfr 56 previous but 36 on admit   JUAN FRANCISCO on admit w. Elevated BUN/crt 43/1.3 from BL 20/0.9  Pt denies any urinary sx's or decreased urination  BUN/Crt to baseline 5/30  Urine studies demonstrate pre-renal cause  JUAN FRANCISCO resolved 5/30    Plan:  Will hold nephrotoxic drugs and renally dose medications as necessary      History of CVA (cerebrovascular accident)  Pt had CVA w/ thorough Neuro F/U last seen 8/21 w/ rec to rtc in 1 yr  Pt denies any new neurological complaints or deficits at time of admit  Noted chronic difficulty speaking and subjective R sided weakness  Last Neuro notes new MRI with only old lacunar infarct changes   Per last Neuro  recs cnt home asa and statin    Plan:  Cnt home asa and statin          Tremor  Cnt home primidone 50mg TID as well as metoprolol      Mild episode of recurrent major depressive disorder  Cnt home Citalopram 10mg daily         Hyperlipidemia  Pt on simvastatin 40 qhs at home    Plan:  Will start equivalent atorvastatin 20 qhs while inpt      HTN (hypertension)  Pt adherent with home antihtn including amlodipine 2.5 qd, lisinopril 40mg qd, metoprolol succinate 2mg 24hr qd, and spironolactone-hydrochlorathiazide 25-25mg qd  Increased home amlodipine to 5qd on 5/30    Plan:  Cont to monitor vitals  Cont amlodipine and metoprolol but will hold diuretics in setting of JUAN FRANCISCO  Will titrate medications as needed and add hydralazine prn for SBP >180        VTE Risk Mitigation (From admission, onward)         Ordered     enoxaparin injection 80 mg  Every 24 hours (non-standard times)         05/28/22 1159                Discharge Planning   SEBASTIÁN:      Code Status: Full Code   Is the patient medically ready for discharge?:     Reason for patient still in hospital (select all that apply): Pending disposition  Discharge Plan A: Rehab, Skilled Nursing Facility   Discharge Delays: None known at this time              Vamshi Goel MD  [employer] [facility] PGY-1

## 2022-06-02 NOTE — PLAN OF CARE
D/c orders noted.     Set up completed for pt to transfer to [facility] in [facility]. Rn can call report to [phone] room 111.     Joseph met with pt and pt's son Christos to discuss d/c planning. Joseph informed pt and Christos that pt will be transferring to [facility] today. Pt and Christos are agreeable for pt to transfer to [facility]. Joseph completed patient choice form with pt and Christos.     Joseph set up ambulance transportation for 4:30p.m.     Pt is cleared to go from CM standpoint.        06/02/22 1458   Final Note   Assessment Type Final Discharge Note   Anticipated Discharge Disposition Rehab  ([facility])   What phone number can be called within the next 1-3 days to see how you are doing after discharge? [phone]   Hospital Resources/Appts/Education Provided Appointments scheduled by Navigator/Coordinator   Post-Acute Status   Post-Acute Authorization Placement   Post-Acute Placement Status Set-up Complete/Auth obtained   Discharge Delays None known at this time

## 2022-06-02 NOTE — PLAN OF CARE
Joseph spoke with Sivan with [facility] in [facility]. Per Sivan, pt can transfer to [facility] today.     Joseph faxed facility transfers to [facility] via [facility].     Joseph will continue to follow pt for d/c planning.        06/02/22 9906   Post-Acute Status   Post-Acute Authorization Placement   Discharge Plan   Discharge Plan A Rehab

## 2022-06-02 NOTE — PLAN OF CARE
I spoke with Christos (pt's son) [phone] regarding placement.  Pt has been accepted by [facility] and [facility].  He prefers [facility].  Plan is for pt to discharge today.       06/02/22 1125   Post-Acute Status   Post-Acute Authorization Placement     Rick Gaspar RN, CM  [phone]

## 2022-06-02 NOTE — PLAN OF CARE
Discharge orders noted. AVS prepared with medication list, importance of medication compliance, follow up appointments, diet, home care instructions, treatment plan, self management, and when to seek medical attention. Detailed clinical reference list attached. Bedside nurse Leon to print AVS and placed in discharge packet for accepting facility once patient cleared by .

## 2022-08-04 PROBLEM — H61.21 IMPACTED CERUMEN OF RIGHT EAR: Status: ACTIVE | Noted: 2022-01-01

## 2022-08-04 PROBLEM — H61.21 HEARING LOSS DUE TO CERUMEN IMPACTION, RIGHT: Status: ACTIVE | Noted: 2022-01-01

## 2022-08-04 NOTE — PROGRESS NOTES
Subjective:       Patient ID: Alysia Ross is a 89 y.o. female.    Chief Complaint: Ear Fullness (With itching - in R.ear )    Patient is known, to me and presents with   Chief Complaint   Patient presents with    Ear Fullness     With itching - in R.ear    .  Denies chest pain and shortness of breath.  Patient presents with her son in regards to right ear canal wax impaction. Having difficult time hearing out of that ear.    HPI  Review of Systems   Constitutional: Negative.    HENT: Positive for hearing loss.    Eyes: Negative.    Respiratory: Negative.    Cardiovascular: Positive for leg swelling.   Skin: Negative.    Hematological: Negative.        Objective:      Physical Exam  Constitutional:       General: She is not in acute distress.     Appearance: Normal appearance. She is obese. She is not ill-appearing, toxic-appearing or diaphoretic.   HENT:      Head: Normocephalic and atraumatic.      Right Ear: Decreased hearing noted. There is impacted cerumen.      Left Ear: No decreased hearing noted. There is no impacted cerumen.      Nose: Nose normal.      Mouth/Throat:      Mouth: Mucous membranes are moist.      Pharynx: Oropharynx is clear. No posterior oropharyngeal erythema.   Eyes:      General:         Right eye: No discharge.         Left eye: No discharge.      Conjunctiva/sclera: Conjunctivae normal.   Neck:      Vascular: No carotid bruit.   Cardiovascular:      Rate and Rhythm: Regular rhythm.      Heart sounds: Normal heart sounds. No murmur heard.  Pulmonary:      Effort: Pulmonary effort is normal. No respiratory distress.      Breath sounds: Normal breath sounds. No stridor. No wheezing, rhonchi or rales.   Chest:      Chest wall: No tenderness.   Musculoskeletal:      Cervical back: Normal range of motion and neck supple. No rigidity or tenderness.      Right lower leg: Edema present.      Left lower leg: Edema present.      Comments: Chronic LE edema   Lymphadenopathy:      Cervical:  "No cervical adenopathy.   Skin:     General: Skin is warm and dry.      Capillary Refill: Capillary refill takes less than 2 seconds.      Coloration: Skin is not jaundiced or pale.      Findings: No bruising, erythema, lesion or rash.   Neurological:      Mental Status: She is alert.         Assessment:       1. Impacted cerumen of right ear    2. Hearing loss due to cerumen impaction, right        Plan:   Alysia ROBERTS was seen today for ear fullness.    Diagnoses and all orders for this visit:    Impacted cerumen of right ear  -     EAR CERUMEN REMOVAL; Future    Hearing loss due to cerumen impaction, right      "This note will not be shared with the patient."  Ceruminosis is noted.  Wax is removed by syringing and manual debridement. Instructions for home care to prevent wax buildup are given.  Tolerated well  rtc as scheduled  "

## 2023-08-08 NOTE — TELEPHONE ENCOUNTER
ANNEI: [facility] (Spirono/HCTZ 25/25) [facility] state that Potas Plus Aldactazide may lead to Cardiac Arrest.Please review Thank you.   warm

## 2024-04-21 NOTE — TELEPHONE ENCOUNTER
----- Message from Ryan Bobby sent at 2020 12:33 PM CDT -----  Contact: Patient  Alysia Ross  MRN: [id number]  : 3/1/1931  PCP: Carl Aguayo  Home Phone      [phone]  Work Phone      Not on file.  Mobile          Not on file.      MESSAGE:  wants to make sure Rx for Potassium is removed from her med list --Dr Aguayo discontinued @ her last appt    Call Daughter - Julee @ [phone]    PCP: Dede    
Dr. Aguayo made note to D/c K+ at her most recent visit but did not remove it from her chart. Removed from chart at pt's daughter's request. She states she let the pharmacy know as well.  
no
